# Patient Record
Sex: FEMALE | Race: WHITE | NOT HISPANIC OR LATINO | Employment: FULL TIME | URBAN - METROPOLITAN AREA
[De-identification: names, ages, dates, MRNs, and addresses within clinical notes are randomized per-mention and may not be internally consistent; named-entity substitution may affect disease eponyms.]

---

## 2018-09-08 ENCOUNTER — HOSPITAL ENCOUNTER (EMERGENCY)
Facility: HOSPITAL | Age: 53
Discharge: HOME | End: 2018-09-08
Attending: EMERGENCY MEDICINE
Payer: COMMERCIAL

## 2018-09-08 VITALS
TEMPERATURE: 98 F | WEIGHT: 235 LBS | OXYGEN SATURATION: 98 % | BODY MASS INDEX: 40.12 KG/M2 | HEIGHT: 64 IN | HEART RATE: 84 BPM | SYSTOLIC BLOOD PRESSURE: 137 MMHG | DIASTOLIC BLOOD PRESSURE: 79 MMHG | RESPIRATION RATE: 16 BRPM

## 2018-09-08 DIAGNOSIS — S89.91XA RIGHT KNEE INJURY: Primary | ICD-10-CM

## 2018-09-08 PROCEDURE — 99283 EMERGENCY DEPT VISIT LOW MDM: CPT | Mod: 25

## 2018-09-08 PROCEDURE — 25000003 PHARM REV CODE 250: Performed by: EMERGENCY MEDICINE

## 2018-09-08 PROCEDURE — 99283 EMERGENCY DEPT VISIT LOW MDM: CPT | Mod: ,,, | Performed by: EMERGENCY MEDICINE

## 2018-09-08 RX ORDER — HYDROCODONE BITARTRATE AND ACETAMINOPHEN 5; 325 MG/1; MG/1
1 TABLET ORAL
Status: COMPLETED | OUTPATIENT
Start: 2018-09-08 | End: 2018-09-08

## 2018-09-08 RX ORDER — HYDROCODONE BITARTRATE AND ACETAMINOPHEN 5; 325 MG/1; MG/1
1 TABLET ORAL EVERY 4 HOURS PRN
Qty: 12 TABLET | Refills: 0 | Status: SHIPPED | OUTPATIENT
Start: 2018-09-08 | End: 2022-09-03 | Stop reason: CLARIF

## 2018-09-08 RX ORDER — NAPROXEN 375 MG/1
375 TABLET ORAL 2 TIMES DAILY
COMMUNITY
End: 2022-09-03 | Stop reason: CLARIF

## 2018-09-08 RX ADMIN — HYDROCODONE BITARTRATE AND ACETAMINOPHEN 1 TABLET: 5; 325 TABLET ORAL at 09:09

## 2018-09-08 NOTE — ED PROVIDER NOTES
Encounter Date: 9/8/2018    SCRIBE #1 NOTE: I, Veronica Connor, am scribing for, and in the presence of,  Dr. Lee. I have scribed the entire note.       History     Chief Complaint   Patient presents with    Knee Pain     Chronic knee pain. Last received steroid injection in right knee last month.      Time patient was seen by the provider: 8:41 AM      The patient is a 52 y.o. female with chronic left knee pain who presents to the ED with a complaint of right knee pain. Pt reports increased reliance of right knee since left knee injury with worsening right knee pain for the past 4-5 days.Patient reports her knee locked up as she was walking down stairs at home 1 hour PTA. She could not move and had to scoot down the stairs. She reports she received a steroid injection in her left knee a month ago and had adjusted weight bearing to right knee. She states she thinks she overworked herself over the weekend and the pain is now worse with movement. She has been taking prescribed Naprosyn, with increasing dose yesterday, with no relief. Patient contacted her PCP to schedule an appointment 3 days ago, but has not received a call back. She has an appointment scheduled MRI for left knee. No weakness or paresthesias to RLE.      The history is provided by the patient and medical records.     Review of patient's allergies indicates:  No Known Allergies  History reviewed. No pertinent past medical history.  History reviewed. No pertinent surgical history.  Family History   Problem Relation Age of Onset    Diabetes Mother     Hypertension Mother     Diabetes Father     Hypertension Father      Social History     Tobacco Use    Smoking status: Never Smoker    Smokeless tobacco: Never Used   Substance Use Topics    Alcohol use: No     Frequency: Never    Drug use: No     Review of Systems   Constitutional: Negative for fever.   Respiratory: Negative for shortness of breath.    Cardiovascular: Negative for chest pain.    Gastrointestinal: Negative for abdominal pain.   Musculoskeletal: Positive for arthralgias (right knee).   Neurological: Negative for weakness.       Physical Exam     Initial Vitals [09/08/18 0833]   BP Pulse Resp Temp SpO2   137/79 84 16 98.2 °F (36.8 °C) 98 %      MAP       --         Physical Exam    Nursing note and vitals reviewed.  Constitutional: She appears well-developed and well-nourished. She is not diaphoretic. No distress.   Cardiovascular: Normal rate.   Pulmonary/Chest: No respiratory distress.   Musculoskeletal: She exhibits tenderness.        Right knee: She exhibits swelling. She exhibits no effusion, no deformity, no LCL laxity, normal patellar mobility and no MCL laxity. Tenderness found. No medial joint line, no lateral joint line, no MCL, no LCL and no patellar tendon tenderness noted.   Tenderness on the inferior lateral right knee. No calf tenderness. No appreciable effusion. Active flexion and extension is preserved. Anterior drawer sign negative.         ED Course   Procedures  Labs Reviewed - No data to display       Imaging Results          X-Ray Knee 1 or 2 View Right (Final result)  Result time 09/08/18 09:23:41   Procedure changed from X-Ray Knee 3 View Right     Final result by Chana Boothe MD (09/08/18 09:23:41)                 Impression:      No acute fracture.      Electronically signed by: Chana Boothe MD  Date:    09/08/2018  Time:    09:23             Narrative:    EXAMINATION:  XR KNEE 1 OR 2 VIEW RIGHT    CLINICAL HISTORY:  right knee injury;  Unspecified injury of right lower leg, initial encounter    TECHNIQUE:  AP and lateral views of the right knee were performed.    COMPARISON:  None    FINDINGS:  There is no evidence of acute fracture, dislocation, or bone destruction.  There is mild patellofemoral spurring and medial compartment joint space narrowing consistent with early osteoarthritis.                                 Medical Decision Making:   Initial  Assessment:   52 y.o. female presents with right knee pain and low risk injury. My differential diagnosis includes, but is not limited to: fracture, strain, sprain. Patient with soft compartments and neurovascularly intact, I doubt compartment syndrome. Good ROM without fever or significant effusion to suggest septic joint. Will administer ice, analgesics and obtain xray.    Reassessment  Xray reviewed, negative for acute process. Provided with instructions to RICE, analgesics and follow up with PCP.  Clinical Tests:   Radiological Study: Ordered and Reviewed            Scribe Attestation:   Scribe #1: I performed the above scribed service and the documentation accurately describes the services I performed. I attest to the accuracy of the note.    Attending Attestation:           Physician Attestation for Scribe:      Comments: I, Dr. Du Lee, personally performed the services described in this documentation. All medical record entries made by the scribe were at my direction and in my presence.  I have reviewed the chart and agree that the record reflects my personal performance and is accurate and complete. Du Lee MD.  10:17 AM 09/08/2018                 Clinical Impression:   The encounter diagnosis was Right knee injury.      Disposition:   Disposition: Discharged  Condition: Stable                        Du Lee MD  09/08/18 1017

## 2018-09-08 NOTE — ED TRIAGE NOTES
Presents to ER with complaint of right knee pain for 1 month.  Patient's name and date of birth checked and is correct.  LOC: The patient is awake, alert and aware of environment with an appropriate affect, the patient is oriented x 3 and speaking appropriately.  APPEARANCE: Patient resting comfortably and in no acute distress, patient is clean and well groomed, patient's clothing is properly fastened.  CARDIOVASCULAR:  Heart rate regular and even with no peripheral edema noted.  SKIN: The skin is warm and dry, patient has normal skin turgor and moist mucus membranes, skin intact, no breakdown or brusing noted. MUSKULOSKELETAL: Patient moving all extremities, requires assistance to transfer due to pain in her right knee, no obvious swelling or deformities noted.  RESPIRATORY: Airway is open and patent, respirations are spontaneous, patient has a normal effort and rate.

## 2022-01-02 ENCOUNTER — HOSPITAL ENCOUNTER (EMERGENCY)
Facility: HOSPITAL | Age: 57
Discharge: HOME | End: 2022-01-02
Attending: EMERGENCY MEDICINE
Payer: COMMERCIAL

## 2022-01-02 VITALS
BODY MASS INDEX: 40.34 KG/M2 | HEART RATE: 98 BPM | TEMPERATURE: 99 F | WEIGHT: 235 LBS | RESPIRATION RATE: 18 BRPM | OXYGEN SATURATION: 96 % | DIASTOLIC BLOOD PRESSURE: 64 MMHG | SYSTOLIC BLOOD PRESSURE: 131 MMHG

## 2022-01-02 DIAGNOSIS — U07.1 UPPER RESPIRATORY TRACT INFECTION DUE TO COVID-19 VIRUS: Primary | ICD-10-CM

## 2022-01-02 DIAGNOSIS — U07.1 COVID-19 VIRUS INFECTION: ICD-10-CM

## 2022-01-02 DIAGNOSIS — J06.9 UPPER RESPIRATORY TRACT INFECTION DUE TO COVID-19 VIRUS: Primary | ICD-10-CM

## 2022-01-02 DIAGNOSIS — E86.0 DEHYDRATION: ICD-10-CM

## 2022-01-02 DIAGNOSIS — R11.0 NAUSEA: ICD-10-CM

## 2022-01-02 LAB
BUN SERPL-MCNC: 12 MG/DL (ref 6–30)
CHLORIDE SERPL-SCNC: 104 MMOL/L (ref 95–110)
CREAT SERPL-MCNC: 0.4 MG/DL (ref 0.5–1.4)
CTP QC/QA: YES
GLUCOSE SERPL-MCNC: 124 MG/DL (ref 70–110)
HCT VFR BLD CALC: 38 %PCV (ref 36–54)
POC IONIZED CALCIUM: 1.18 MMOL/L (ref 1.06–1.42)
POC TCO2 (MEASURED): 23 MMOL/L (ref 23–29)
POTASSIUM BLD-SCNC: 3.6 MMOL/L (ref 3.5–5.1)
SAMPLE: ABNORMAL
SARS-COV-2 RDRP RESP QL NAA+PROBE: POSITIVE
SODIUM BLD-SCNC: 139 MMOL/L (ref 136–145)

## 2022-01-02 PROCEDURE — 99284 EMERGENCY DEPT VISIT MOD MDM: CPT | Mod: CS,,, | Performed by: EMERGENCY MEDICINE

## 2022-01-02 PROCEDURE — 87389 HIV-1 AG W/HIV-1&-2 AB AG IA: CPT | Performed by: EMERGENCY MEDICINE

## 2022-01-02 PROCEDURE — 80047 BASIC METABLC PNL IONIZED CA: CPT

## 2022-01-02 PROCEDURE — 86803 HEPATITIS C AB TEST: CPT | Performed by: EMERGENCY MEDICINE

## 2022-01-02 PROCEDURE — 99284 PR EMERGENCY DEPT VISIT,LEVEL IV: ICD-10-PCS | Mod: CS,,, | Performed by: EMERGENCY MEDICINE

## 2022-01-02 PROCEDURE — 63600175 PHARM REV CODE 636 W HCPCS: Performed by: EMERGENCY MEDICINE

## 2022-01-02 PROCEDURE — U0002 COVID-19 LAB TEST NON-CDC: HCPCS | Performed by: EMERGENCY MEDICINE

## 2022-01-02 PROCEDURE — 25000003 PHARM REV CODE 250: Performed by: EMERGENCY MEDICINE

## 2022-01-02 PROCEDURE — 99284 EMERGENCY DEPT VISIT MOD MDM: CPT | Mod: 25

## 2022-01-02 RX ORDER — ONDANSETRON 4 MG/1
4 TABLET, ORALLY DISINTEGRATING ORAL
Status: COMPLETED | OUTPATIENT
Start: 2022-01-02 | End: 2022-01-02

## 2022-01-02 RX ORDER — BUTALBITAL, ACETAMINOPHEN AND CAFFEINE 50; 325; 40 MG/1; MG/1; MG/1
1 TABLET ORAL EVERY 6 HOURS PRN
Qty: 20 TABLET | Refills: 0 | Status: SHIPPED | OUTPATIENT
Start: 2022-01-02 | End: 2022-01-07

## 2022-01-02 RX ORDER — ONDANSETRON 4 MG/1
4 TABLET, FILM COATED ORAL EVERY 6 HOURS
Qty: 12 TABLET | Refills: 0 | Status: SHIPPED | OUTPATIENT
Start: 2022-01-02 | End: 2022-09-03 | Stop reason: CLARIF

## 2022-01-02 RX ORDER — GUAIFENESIN/DEXTROMETHORPHAN 100-10MG/5
10 SYRUP ORAL
Status: COMPLETED | OUTPATIENT
Start: 2022-01-02 | End: 2022-01-02

## 2022-01-02 RX ORDER — ACETAMINOPHEN 500 MG
1000 TABLET ORAL
Status: COMPLETED | OUTPATIENT
Start: 2022-01-02 | End: 2022-01-02

## 2022-01-02 RX ADMIN — SODIUM CHLORIDE 500 ML: 0.9 INJECTION, SOLUTION INTRAVENOUS at 10:01

## 2022-01-02 RX ADMIN — ONDANSETRON 4 MG: 4 TABLET, ORALLY DISINTEGRATING ORAL at 10:01

## 2022-01-02 RX ADMIN — DEXAMETHASONE 6 MG: 4 TABLET ORAL at 11:01

## 2022-01-02 RX ADMIN — ACETAMINOPHEN 1000 MG: 500 TABLET ORAL at 10:01

## 2022-01-02 RX ADMIN — GUAIFENESIN AND DEXTROMETHORPHAN 10 ML: 100; 10 SYRUP ORAL at 10:01

## 2022-01-02 NOTE — Clinical Note
"Yenifer"India Chatterjee was seen and treated in our emergency department on 1/2/2022.     COVID-19 is present in our communities across the state. There is limited testing for COVID at this time, so not all patients can be tested. In this situation, your employee meets the following criteria:    Yenifer Chatterjee has met the criteria for COVID-19 testing and has a POSITIVE result. She can return to work once they are asymptomatic for 72 hours without the use of fever reducing medications AND at least ten days from the first positive result.     If you have any questions or concerns, or if I can be of further assistance, please do not hesitate to contact me.    Sincerely,             Salomon Goodwin, DO"

## 2022-01-03 LAB
HCV AB SERPL QL IA: NEGATIVE
HIV 1+2 AB+HIV1 P24 AG SERPL QL IA: NEGATIVE

## 2022-01-03 NOTE — ED NOTES
"Yenifer Chatterjee, a 56 y.o. female presents to the ED w/ complaint of covid concerns. Pt states she tested positive yesterday and has experienced "nonstop coughing" since then. Pt reports HA, SOB, sore throat, and fever.    Triage note:  Chief Complaint   Patient presents with    COVID-19 Concerns     Had +home covid test. Pt c/o headache, sore throat, n/v, and fever      Review of patient's allergies indicates:  No Known Allergies  No past medical history on file.    Patient identifiers verified and correct for Yenifer Chatterjee    LOC: The patient is awake, alert, and aware of environment. The patient is AOX4 and speaking appropriately.   APPEARANCE: No acute distress noted.   HEENT: HA, sore throat, cough, PERRLA  PSYCHOSOCIAL: Patient is calm and cooperative. Denies SI/HI.  SKIN: The skin is warm, dry, color consistent with ethnicity. No breakdown or brusing visible.  RESPIRATORY: Airway is open and patent. Bilateral chest rise and fall. Respiratory rate even and unlabored.  No accessory muscle use noted.  CARDIAC: Patient has a normal rate and rhythm. No complaints of chest pain.  ABDOMEN/GI: Soft, non tender. No distention noted. Denies n/v/d.   URINARY:  Voids independently without difficulty. No complaints of frequency, urgency, burning, or blood in urine.   NEUROLOGIC: Eyes open spontaneously. Speech clear.  Able to follow commands, demonstrating ability to actively and appropriately communicate within context of current conversation. Symmetrical facial muscles. Movement is purposeful. Denies dizziness/lightheadedness.  MUSCULOSKELETAL: No obvious deformities noted. Full ROM in all extremities.  PERIPHERAL VASCULAR: Cap refill <3 secs bilaterally. No peripheral edema noted. Denies numbness and tingling in extremities.      "

## 2022-01-03 NOTE — ED NOTES
I-STAT Chem-8+ Results:   Value Reference Range   Sodium 139 136-145 mmol/L   Potassium  3.6 3.5-5.1 mmol/L   Chloride 104  mmol/L   Ionized Calcium 1.18 1.06-1.42 mmol/L   CO2 (measured) 23 23-29 mmol/L   Glucose 124  mg/dL   BUN 12 6-30 mg/dL   Creatinine 0.4 0.5-1.4 mg/dL   Hematocrit 38 36-54%

## 2022-01-03 NOTE — ED PROVIDER NOTES
Encounter Date: 1/2/2022       History     Chief Complaint   Patient presents with    COVID-19 Concerns     Had +home covid test. Pt c/o headache, sore throat, n/v, and fever      HPI   Yenifer Chatterjee is a 56-year-old female with no significant medical history presenting with headache, sore throat, nausea, myalgias and fever.  She reports taking Advil 3 times a day, and is currently afebrile but complains of a headache, sore throat and body aches.  She had a home test that was positive for COVID yesterday and continue having symptoms.  She denies any active chest pain, lightheadedness, dizziness, visual changes, abdominal pain, chest pain, leg pain, arm pain or jaw pain.  No other aggravating or alleviating factors.  Onset has been gradual.  No known sick contacts, no history of diabetes, hypertension or cardiac disease.    Review of patient's allergies indicates:  No Known Allergies  No past medical history on file.  No past surgical history on file.  Family History   Problem Relation Age of Onset    Diabetes Mother     Hypertension Mother     Diabetes Father     Hypertension Father      Social History     Tobacco Use    Smoking status: Never Smoker    Smokeless tobacco: Never Used   Substance Use Topics    Alcohol use: No    Drug use: No     Review of Systems   Constitutional: Positive for fatigue and fever. Negative for chills.   HENT: Positive for rhinorrhea and sore throat. Negative for congestion, ear pain and trouble swallowing.    Eyes: Negative for photophobia, redness and visual disturbance.   Respiratory: Positive for cough. Negative for chest tightness and shortness of breath.    Gastrointestinal: Positive for nausea (Post-tussive emesis) and vomiting. Negative for abdominal pain.   Genitourinary: Negative for dysuria and flank pain.   Musculoskeletal: Positive for myalgias. Negative for back pain and neck pain.   Skin: Negative for pallor and wound.   Neurological: Positive for headaches.  Negative for tremors, weakness and light-headedness.   Psychiatric/Behavioral: Negative for confusion. The patient is not nervous/anxious.        Physical Exam     Initial Vitals [01/02/22 2149]   BP Pulse Resp Temp SpO2   (!) 116/58 108 20 98.8 °F (37.1 °C) 95 %      MAP       --         Physical Exam    Nursing note and vitals reviewed.      Constitutional: Well-developed. Well-nourished. Moderate emotional distress.  HENT: NCAT. OP moist. Uvula midline. No OP masses. Posterior pharynx with no erythema. No tonsillar edema. No exudate. Floor of the mouth is soft, tongue is not elevated. No rhinorrhea. TMs clear bilaterally. Mastoid process nontender bilaterally.  EYES: No conjunctival injection or discharge.  NECK: Full ROM. Supple. No rigidity. No cervical LAD. No stridor. Brudzinskis test negative.  CARDIAC: RRR. No murmurs or rubs. Strong distal pulses.  PULM: Normal effort. Breath sounds clear bilaterally. No wheezes. No crackles.  ABD: Soft, non-tender, non-distended, normal BS. No guarding. No rebound.  : No CVA tenderness.  MS: Warm and well perfused. No edema..  NEURO: Alert and oriented x3.  No sensory or motor deficits.  Negative Romberg.  Normal gate.  SKIN: Dry. No rashes.  No cyanosis or jaundice.  PSYCH: Normal judgment. Normal affect.        ED Course   Procedures  Labs Reviewed   SARS-COV-2 RDRP GENE - Abnormal; Notable for the following components:       Result Value    POC Rapid COVID Positive (*)     All other components within normal limits    Narrative:     This test utilizes isothermal nucleic acid amplification   technology to detect the SARS-CoV-2 RdRp nucleic acid segment.   The analytical sensitivity (limit of detection) is 125 genome   equivalents/mL.   A POSITIVE result implies infection with the SARS-CoV-2 virus;   the patient is presumed to be contagious.     A NEGATIVE result means that SARS-CoV-2 nucleic acids are not   present above the limit of detection. A NEGATIVE result  should be   treated as presumptive. It does not rule out the possibility of   COVID-19 and should not be the sole basis for treatment decisions.   If COVID-19 is strongly suspected based on clinical and exposure   history, re-testing using an alternate molecular assay should be   considered.   This test is only for use under the Food and Drug   Administration s Emergency Use Authorization (EUA).   Commercial kits are provided by [employer].   Performance characteristics of the EUA have been independently   verified by [facility] Department of   Pathology and Laboratory Medicine.   _________________________________________________________________   The authorized Fact Sheet for Healthcare Providers and the authorized Fact   Sheet for Patients of the ID NOW COVID-19 are available on the FDA   website:     https://www.fda.gov/media/129175/download  https://www.fda.gov/media/775688/download       ISTAT PROCEDURE - Abnormal; Notable for the following components:    POC Glucose 124 (*)     POC Creatinine 0.4 (*)     All other components within normal limits   HIV 1 / 2 ANTIBODY    Narrative:     Release to patient->Immediate   HEPATITIS C ANTIBODY    Narrative:     Release to patient->Immediate          Imaging Results          X-Ray Chest AP Portable (Final result)  Result time 01/02/22 23:03:46    Final result by Ben Hamilton MD (01/02/22 23:03:46)                 Impression:      No acute findings in the chest noting limited assessment of the lower lateral left hemithorax due to overlying artifact.  If clinically indicated, repeat chest x-ray can be ordered with artifact removed.      Electronically signed by: Ben Hamilton MD  Date:    01/02/2022  Time:    23:03             Narrative:    EXAMINATION:  XR CHEST AP PORTABLE    CLINICAL HISTORY:  cough;    TECHNIQUE:  Single frontal view of the chest was performed.    COMPARISON:  None    FINDINGS:  Lower lateral left hemithorax partially  obscured by overlying external artifact.    No consolidation, pleural effusion or pneumothorax identified.    Cardiomediastinal silhouette is unremarkable.                              X-Rays:   Independently Interpreted Readings:   Chest X-Ray: Normal heart size.  No infiltrates.  No acute abnormalities. No pneumothorax or free air     Medications   ondansetron disintegrating tablet 4 mg (4 mg Oral Given 1/2/22 2225)   acetaminophen tablet 1,000 mg (1,000 mg Oral Given 1/2/22 2225)   dextromethorphan-guaiFENesin  mg/5 ml liquid 10 mL (10 mLs Oral Given 1/2/22 2225)   sodium chloride 0.9% bolus 500 mL (0 mLs Intravenous Stopped 1/2/22 2310)   dexAMETHasone tablet 6 mg (6 mg Oral Given 1/2/22 2322)     Medical Decision Making:   History:   Old Medical Records: I decided to obtain old medical records.  Initial Assessment:   Yenifer Chatterjee is a 56-year-old female with no significant medical history presenting with headache, sore throat, nausea, myalgias and fever.  Differential Diagnosis:   Upper respiratory infection secondary COVID-19, COVID-19 infection, pneumonia, URI, viral syndrome  Independently Interpreted Test(s):   I have ordered and independently interpreted X-rays - see prior notes.  Clinical Tests:   Lab Tests: Ordered and Reviewed  Radiological Study: Ordered and Reviewed    Urgent evaluation patient presenting with nausea and concern for dehydration secondary to self tested COVID-19.  She is currently afebrile vital signs are stable.  Physical exam findings remarkable for dry skin, dry mucous membranes, lung sounds clear, cardiac exam unremarkable.  She continues to have cough with post-tussive emesis and nausea.  Given worsening cough in the setting of COVID-19, chest x-ray obtained which shows no evidence of pneumonia, pneumothorax or free air on my read.  Patient treated with antiemetic in the ED, and she was able to tolerate p.o. during ED evaluation.  Skin, exam consistent with likely  dehydration from poor p.o. intake.  Patient tolerating p.o., discharged home with Zofran and close outpatient follow-up as needed.  Oxygen saturation greater than 96% on room air with ambulation.  Patient given strict ED precautions and return instructions and follow-up plan in place.  Do not suspect pneumonia, serious bacterial infection, or hypoxemic respiratory failure. Patient agreeable to discharge plan. Strict ED precautions and return instructions discussed at length and patient verbalized understanding. All questions were answered and ample time was given for questions.      Complexity:  Moderate high risk                    Clinical Impression:   Final diagnoses:  [U07.1, J06.9] Upper respiratory tract infection due to COVID-19 virus (Primary)  [U07.1] COVID-19 virus infection  [E86.0] Dehydration  [R11.0] Nausea          ED Disposition Condition    Discharge Stable        ED Prescriptions     Medication Sig Dispense Start Date End Date Auth. Provider    ondansetron (ZOFRAN) 4 MG tablet Take 1 tablet (4 mg total) by mouth every 6 (six) hours. 12 tablet 1/2/2022  Salomon Goodwin DO    butalbital-acetaminophen-caffeine -40 mg (FIORICET, ESGIC) -40 mg per tablet Take 1 tablet by mouth every 6 (six) hours as needed for Pain. 20 tablet 1/2/2022 1/7/2022 Salomon Goodwin DO        Follow-up Information     Follow up With Specialties Details Why Contact Info Additional Information    [facility] Internal Medicine Schedule an appointment as soon as possible for a visit in 1 week As needed, If symptoms worsen [address]  [facility] [id number] [phone] [facility] Please park in surface lot and check in at central registration desk         Salomon Goodwin DO, FAAEM  Emergency Staff Physician   Dept of Emergency Medicine   [facility]  Spectralink: [phone]        Disclaimer: This note has been generated using  voice-recognition software. There may be typographical errors that have been missed during proof-reading.       Salomon Goodwin,   01/04/22 1503

## 2022-01-03 NOTE — DISCHARGE INSTRUCTIONS
Today, your evaluation for your symptoms are likely due to COVID-19 upper respiratory infection.  You have been given medication for nausea, medication for headache, and will require follow-up with your primary care in 1 week.  Here given fluids for dehydration.  Your labs are normal and your chest x-ray did not show any acute pneumonia.    Our goal in the emergency department is to always give you outstanding care and exceptional service. You may receive a survey by mail or e-mail in the next week regarding your experience in our ED. We would greatly appreciate your completing and returning the survey. Your feedback provides us with a way to recognize our staff who give very good care and it helps us learn how to improve when your experience was below our aspiration of excellence.

## 2022-09-03 ENCOUNTER — HOSPITAL ENCOUNTER (EMERGENCY)
Facility: HOSPITAL | Age: 57
Discharge: HOME | End: 2022-09-03
Attending: EMERGENCY MEDICINE
Payer: COMMERCIAL

## 2022-09-03 VITALS
RESPIRATION RATE: 16 BRPM | BODY MASS INDEX: 39.41 KG/M2 | DIASTOLIC BLOOD PRESSURE: 58 MMHG | OXYGEN SATURATION: 95 % | HEART RATE: 94 BPM | TEMPERATURE: 100 F | SYSTOLIC BLOOD PRESSURE: 115 MMHG | WEIGHT: 229.63 LBS

## 2022-09-03 DIAGNOSIS — R11.2 NAUSEA AND VOMITING, INTRACTABILITY OF VOMITING NOT SPECIFIED, UNSPECIFIED VOMITING TYPE: Primary | ICD-10-CM

## 2022-09-03 LAB
INFLUENZA A, MOLECULAR: NEGATIVE
INFLUENZA B, MOLECULAR: NEGATIVE
SARS-COV-2 RDRP RESP QL NAA+PROBE: NEGATIVE
SPECIMEN SOURCE: NORMAL

## 2022-09-03 PROCEDURE — 96374 THER/PROPH/DIAG INJ IV PUSH: CPT

## 2022-09-03 PROCEDURE — 96361 HYDRATE IV INFUSION ADD-ON: CPT

## 2022-09-03 PROCEDURE — 87502 INFLUENZA DNA AMP PROBE: CPT | Performed by: EMERGENCY MEDICINE

## 2022-09-03 PROCEDURE — 63600175 PHARM REV CODE 636 W HCPCS: Performed by: EMERGENCY MEDICINE

## 2022-09-03 PROCEDURE — 96375 TX/PRO/DX INJ NEW DRUG ADDON: CPT

## 2022-09-03 PROCEDURE — 99284 EMERGENCY DEPT VISIT MOD MDM: CPT | Mod: 25

## 2022-09-03 PROCEDURE — 25000003 PHARM REV CODE 250: Performed by: EMERGENCY MEDICINE

## 2022-09-03 PROCEDURE — U0002 COVID-19 LAB TEST NON-CDC: HCPCS | Performed by: EMERGENCY MEDICINE

## 2022-09-03 RX ORDER — ONDANSETRON 4 MG/1
4 TABLET, ORALLY DISINTEGRATING ORAL EVERY 6 HOURS PRN
Qty: 30 TABLET | Refills: 0 | Status: ON HOLD | OUTPATIENT
Start: 2022-09-03 | End: 2024-03-19 | Stop reason: HOSPADM

## 2022-09-03 RX ORDER — KETOROLAC TROMETHAMINE 30 MG/ML
15 INJECTION, SOLUTION INTRAMUSCULAR; INTRAVENOUS
Status: COMPLETED | OUTPATIENT
Start: 2022-09-03 | End: 2022-09-03

## 2022-09-03 RX ORDER — ONDANSETRON 2 MG/ML
8 INJECTION INTRAMUSCULAR; INTRAVENOUS
Status: COMPLETED | OUTPATIENT
Start: 2022-09-03 | End: 2022-09-03

## 2022-09-03 RX ADMIN — KETOROLAC TROMETHAMINE 15 MG: 30 INJECTION, SOLUTION INTRAMUSCULAR; INTRAVENOUS at 05:09

## 2022-09-03 RX ADMIN — ONDANSETRON 8 MG: 2 INJECTION INTRAMUSCULAR; INTRAVENOUS at 05:09

## 2022-09-03 RX ADMIN — SODIUM CHLORIDE 1000 ML: 0.9 INJECTION, SOLUTION INTRAVENOUS at 05:09

## 2022-09-03 NOTE — ED PROVIDER NOTES
Encounter Date: 9/3/2022       History     Chief Complaint   Patient presents with    General Illness     Vomiting times 1, chills, headaches, stomach hurting. All since around 5 yesterday afternoon. Feels dehydrated.      Healthy 56-year-old female presents to the ER with 12 hours of nausea, headache, chills and 1 episode of vomiting.  Patient reports her mouth is dry and she is thirsty.  She reports she has no saliva in her mouth.  No urinary symptoms.  No cough no sore throat no runny nose.  No diarrhea.  Patient took Tylenol last night. Stomach cramps, no flank pain no hematuria.    The history is provided by the patient and the spouse.   Review of patient's allergies indicates:  No Known Allergies  History reviewed. No pertinent past medical history.  History reviewed. No pertinent surgical history.  Family History   Problem Relation Age of Onset    Diabetes Mother     Hypertension Mother     Diabetes Father     Hypertension Father      Social History     Tobacco Use    Smoking status: Never    Smokeless tobacco: Never   Substance Use Topics    Alcohol use: No    Drug use: No     Review of Systems   Constitutional:  Positive for activity change, appetite change, chills and fatigue.   HENT: Negative.     Respiratory:  Negative for cough.    Cardiovascular:  Negative for chest pain.   Gastrointestinal:  Positive for abdominal pain (cramps), nausea and vomiting.   Genitourinary: Negative.  Negative for flank pain and hematuria.   Musculoskeletal:  Positive for myalgias.   Skin:  Negative for rash.   Neurological:  Positive for headaches.     Physical Exam     Initial Vitals [09/03/22 0429]   BP Pulse Resp Temp SpO2   122/81 (!) 113 16 99.7 °F (37.6 °C) 96 %      MAP       --         Physical Exam    Nursing note and vitals reviewed.  Constitutional: She appears well-developed and well-nourished. She is not diaphoretic. No distress.   Appears uncomfortable   HENT:   Head: Normocephalic and atraumatic.   Eyes: EOM  are normal.   Neck: Neck supple.   Normal range of motion.  Cardiovascular:  Normal rate, regular rhythm and normal heart sounds.     Exam reveals no gallop and no friction rub.       No murmur heard.  Pulmonary/Chest: Breath sounds normal. No respiratory distress. She has no wheezes. She has no rhonchi. She has no rales.   Abdominal: Abdomen is soft. She exhibits no distension. There is no abdominal tenderness. There is no rebound and no guarding.   Musculoskeletal:         General: Normal range of motion.      Cervical back: Normal range of motion and neck supple.     Neurological: She is alert and oriented to person, place, and time.   Skin: Skin is warm and dry.   Psychiatric: She has a normal mood and affect. Her behavior is normal. Judgment and thought content normal.       ED Course   Procedures  Labs Reviewed   INFLUENZA A & B BY MOLECULAR   SARS-COV-2 RNA AMPLIFICATION, QUAL          Imaging Results    None          Medications   sodium chloride 0.9% bolus 1,000 mL (0 mLs Intravenous Stopped 9/3/22 0603)   ondansetron injection 8 mg (8 mg Intravenous Given 9/3/22 0503)   ketorolac injection 15 mg (15 mg Intravenous Given 9/3/22 0502)     Medical Decision Making:   Clinical Tests:   Lab Tests: Ordered and Reviewed           ED Course as of 09/04/22 2054   Sat Sep 03, 2022   0512 SARS-CoV-2 RNA, Amplification, Qual: Negative [EF]   0512 Influenza A, Molecular: Negative [EF]   0512 Influenza B, Molecular: Negative [EF]   0617 56-year-old female presents the ER for headache, chills, nausea and 1 episode of vomiting for last 12 hours.  Patient reports she feels thirsty and dehydrated.  All symptoms are resolved in the ER with Toradol Zofran and IV fluids.  Symptoms are likely viral in nature.  She has no signs of meningitis.  Abdominal exam is benign.  She has no dysuria or flank pain to suggest a  cause of her symptoms. No reason for further labs or imaging, DC from ER symptom free. [EF]      ED Course User  Index  [EF] Jevon Miguel MD             Clinical Impression:   Final diagnoses:  [R11.2] Nausea and vomiting, intractability of vomiting not specified, unspecified vomiting type (Primary)      ED Disposition Condition    Discharge Stable          ED Prescriptions       Medication Sig Dispense Start Date End Date Auth. Provider    ondansetron (ZOFRAN-ODT) 4 MG TbDL Take 1 tablet (4 mg total) by mouth every 6 (six) hours as needed (nausea vomiting). 30 tablet 9/3/2022 -- Jevon Miguel MD          Follow-up Information       Follow up With Specialties Details Why Contact Info    [facility] Emergency Dept Emergency Medicine  As needed, If symptoms worsen [address] [place] [id number] [phone]             Jevon Miguel MD  09/04/22 2055

## 2022-09-04 ENCOUNTER — ANESTHESIA (OUTPATIENT)
Dept: SURGERY | Facility: HOSPITAL | Age: 57
DRG: 854 | End: 2022-09-04
Payer: COMMERCIAL

## 2022-09-04 ENCOUNTER — ANESTHESIA EVENT (OUTPATIENT)
Dept: SURGERY | Facility: HOSPITAL | Age: 57
DRG: 854 | End: 2022-09-04
Payer: COMMERCIAL

## 2022-09-04 ENCOUNTER — HOSPITAL ENCOUNTER (INPATIENT)
Facility: HOSPITAL | Age: 57
LOS: 3 days | Discharge: HOME | DRG: 854 | End: 2022-09-07
Attending: EMERGENCY MEDICINE | Admitting: UROLOGY
Payer: COMMERCIAL

## 2022-09-04 DIAGNOSIS — R06.02 SOB (SHORTNESS OF BREATH): ICD-10-CM

## 2022-09-04 DIAGNOSIS — N20.1 RIGHT URETERAL STONE: Primary | ICD-10-CM

## 2022-09-04 DIAGNOSIS — N20.1 URETERAL STONE: ICD-10-CM

## 2022-09-04 LAB
ALBUMIN SERPL BCP-MCNC: 2.8 G/DL (ref 3.5–5.2)
ALLENS TEST: ABNORMAL
ALLENS TEST: NORMAL
ALP SERPL-CCNC: 79 U/L (ref 55–135)
ALT SERPL W/O P-5'-P-CCNC: 30 U/L (ref 10–44)
ANION GAP SERPL CALC-SCNC: 7 MMOL/L (ref 8–16)
AST SERPL-CCNC: 32 U/L (ref 10–40)
BACTERIA #/AREA URNS AUTO: ABNORMAL /HPF
BASOPHILS # BLD AUTO: 0.03 K/UL (ref 0–0.2)
BASOPHILS NFR BLD: 0.2 % (ref 0–1.9)
BILIRUB SERPL-MCNC: 1.6 MG/DL (ref 0.1–1)
BILIRUB UR QL STRIP: NEGATIVE
BNP SERPL-MCNC: 78 PG/ML (ref 0–99)
BUN SERPL-MCNC: 18 MG/DL (ref 6–20)
CALCIUM SERPL-MCNC: 8.7 MG/DL (ref 8.7–10.5)
CHLORIDE SERPL-SCNC: 107 MMOL/L (ref 95–110)
CLARITY UR REFRACT.AUTO: ABNORMAL
CO2 SERPL-SCNC: 18 MMOL/L (ref 23–29)
COLOR UR AUTO: YELLOW
CREAT SERPL-MCNC: 1.4 MG/DL (ref 0.5–1.4)
D DIMER PPP IA.FEU-MCNC: 9.86 MG/L FEU
DELSYS: ABNORMAL
DELSYS: NORMAL
DIFFERENTIAL METHOD: ABNORMAL
EOSINOPHIL # BLD AUTO: 0 K/UL (ref 0–0.5)
EOSINOPHIL NFR BLD: 0 % (ref 0–8)
ERYTHROCYTE [DISTWIDTH] IN BLOOD BY AUTOMATED COUNT: 13.1 % (ref 11.5–14.5)
EST. GFR  (NO RACE VARIABLE): 43.6 ML/MIN/1.73 M^2
GLUCOSE SERPL-MCNC: 148 MG/DL (ref 70–110)
GLUCOSE UR QL STRIP: NEGATIVE
HCO3 UR-SCNC: 24.3 MMOL/L (ref 24–28)
HCT VFR BLD AUTO: 35.5 % (ref 37–48.5)
HCV AB SERPL QL IA: NORMAL
HGB BLD-MCNC: 12.1 G/DL (ref 12–16)
HGB UR QL STRIP: ABNORMAL
HIV 1+2 AB+HIV1 P24 AG SERPL QL IA: NORMAL
HYALINE CASTS UR QL AUTO: 0 /LPF
IMM GRANULOCYTES # BLD AUTO: 0.11 K/UL (ref 0–0.04)
IMM GRANULOCYTES NFR BLD AUTO: 0.8 % (ref 0–0.5)
KETONES UR QL STRIP: ABNORMAL
LACTATE SERPL-SCNC: 3.8 MMOL/L (ref 0.5–2.2)
LDH SERPL L TO P-CCNC: 2.17 MMOL/L (ref 0.5–2.2)
LEUKOCYTE ESTERASE UR QL STRIP: ABNORMAL
LIPASE SERPL-CCNC: 5 U/L (ref 4–60)
LYMPHOCYTES # BLD AUTO: 0.5 K/UL (ref 1–4.8)
LYMPHOCYTES NFR BLD: 3.1 % (ref 18–48)
MCH RBC QN AUTO: 31.5 PG (ref 27–31)
MCHC RBC AUTO-ENTMCNC: 34.1 G/DL (ref 32–36)
MCV RBC AUTO: 92 FL (ref 82–98)
MICROSCOPIC COMMENT: ABNORMAL
MODE: ABNORMAL
MODE: NORMAL
MONOCYTES # BLD AUTO: 1 K/UL (ref 0.3–1)
MONOCYTES NFR BLD: 6.8 % (ref 4–15)
NEUTROPHILS # BLD AUTO: 13.1 K/UL (ref 1.8–7.7)
NEUTROPHILS NFR BLD: 89.1 % (ref 38–73)
NITRITE UR QL STRIP: NEGATIVE
NRBC BLD-RTO: 0 /100 WBC
PCO2 BLDA: 36.9 MMHG (ref 35–45)
PH SMN: 7.43 [PH] (ref 7.35–7.45)
PH UR STRIP: 6 [PH] (ref 5–8)
PLATELET # BLD AUTO: 118 K/UL (ref 150–450)
PMV BLD AUTO: 10.5 FL (ref 9.2–12.9)
PO2 BLDA: 15 MMHG (ref 40–60)
POC BE: 0 MMOL/L
POC SATURATED O2: 20 % (ref 95–100)
POC TCO2: 25 MMOL/L (ref 24–29)
POCT GLUCOSE: 154 MG/DL (ref 70–110)
POTASSIUM SERPL-SCNC: 3.1 MMOL/L (ref 3.5–5.1)
PROT SERPL-MCNC: 6.3 G/DL (ref 6–8.4)
PROT UR QL STRIP: ABNORMAL
RBC # BLD AUTO: 3.84 M/UL (ref 4–5.4)
RBC #/AREA URNS AUTO: 24 /HPF (ref 0–4)
SAMPLE: ABNORMAL
SAMPLE: NORMAL
SITE: ABNORMAL
SITE: NORMAL
SODIUM SERPL-SCNC: 132 MMOL/L (ref 136–145)
SP GR UR STRIP: >1.03 (ref 1–1.03)
SQUAMOUS #/AREA URNS AUTO: 2 /HPF
TROPONIN I SERPL DL<=0.01 NG/ML-MCNC: <0.006 NG/ML (ref 0–0.03)
URN SPEC COLLECT METH UR: ABNORMAL
WBC # BLD AUTO: 14.65 K/UL (ref 3.9–12.7)
WBC #/AREA URNS AUTO: >100 /HPF (ref 0–5)

## 2022-09-04 PROCEDURE — 99285 PR EMERGENCY DEPT VISIT,LEVEL V: ICD-10-PCS | Mod: ,,, | Performed by: EMERGENCY MEDICINE

## 2022-09-04 PROCEDURE — 63600175 PHARM REV CODE 636 W HCPCS: Performed by: NURSE ANESTHETIST, CERTIFIED REGISTERED

## 2022-09-04 PROCEDURE — 25500020 PHARM REV CODE 255: Performed by: UROLOGY

## 2022-09-04 PROCEDURE — 52332 CYSTOSCOPY AND TREATMENT: CPT | Mod: RT,,, | Performed by: UROLOGY

## 2022-09-04 PROCEDURE — 84484 ASSAY OF TROPONIN QUANT: CPT

## 2022-09-04 PROCEDURE — 96375 TX/PRO/DX INJ NEW DRUG ADDON: CPT

## 2022-09-04 PROCEDURE — C2617 STENT, NON-COR, TEM W/O DEL: HCPCS | Performed by: UROLOGY

## 2022-09-04 PROCEDURE — 71000033 HC RECOVERY, INTIAL HOUR: Performed by: UROLOGY

## 2022-09-04 PROCEDURE — 83690 ASSAY OF LIPASE: CPT

## 2022-09-04 PROCEDURE — 85025 COMPLETE CBC W/AUTO DIFF WBC: CPT

## 2022-09-04 PROCEDURE — 93010 EKG 12-LEAD: ICD-10-PCS | Mod: ,,, | Performed by: INTERNAL MEDICINE

## 2022-09-04 PROCEDURE — 96365 THER/PROPH/DIAG IV INF INIT: CPT

## 2022-09-04 PROCEDURE — 11000001 HC ACUTE MED/SURG PRIVATE ROOM

## 2022-09-04 PROCEDURE — 63600175 PHARM REV CODE 636 W HCPCS

## 2022-09-04 PROCEDURE — 96361 HYDRATE IV INFUSION ADD-ON: CPT

## 2022-09-04 PROCEDURE — 25000003 PHARM REV CODE 250

## 2022-09-04 PROCEDURE — 63600175 PHARM REV CODE 636 W HCPCS: Performed by: STUDENT IN AN ORGANIZED HEALTH CARE EDUCATION/TRAINING PROGRAM

## 2022-09-04 PROCEDURE — 99285 EMERGENCY DEPT VISIT HI MDM: CPT | Mod: 25

## 2022-09-04 PROCEDURE — 80053 COMPREHEN METABOLIC PANEL: CPT

## 2022-09-04 PROCEDURE — 37000008 HC ANESTHESIA 1ST 15 MINUTES: Performed by: UROLOGY

## 2022-09-04 PROCEDURE — 25000003 PHARM REV CODE 250: Performed by: NURSE ANESTHETIST, CERTIFIED REGISTERED

## 2022-09-04 PROCEDURE — 87040 BLOOD CULTURE FOR BACTERIA: CPT

## 2022-09-04 PROCEDURE — 25000003 PHARM REV CODE 250: Performed by: ANESTHESIOLOGY

## 2022-09-04 PROCEDURE — 36000707: Performed by: UROLOGY

## 2022-09-04 PROCEDURE — D9220A PRA ANESTHESIA: Mod: CRNA,,, | Performed by: NURSE ANESTHETIST, CERTIFIED REGISTERED

## 2022-09-04 PROCEDURE — 99900035 HC TECH TIME PER 15 MIN (STAT)

## 2022-09-04 PROCEDURE — 71000016 HC POSTOP RECOV ADDL HR: Performed by: UROLOGY

## 2022-09-04 PROCEDURE — 52332 PR CYSTOSCOPY,INSERT URETERAL STENT: ICD-10-PCS | Mod: RT,,, | Performed by: UROLOGY

## 2022-09-04 PROCEDURE — D9220A PRA ANESTHESIA: Mod: ANES,,, | Performed by: ANESTHESIOLOGY

## 2022-09-04 PROCEDURE — 82803 BLOOD GASES ANY COMBINATION: CPT

## 2022-09-04 PROCEDURE — 87389 HIV-1 AG W/HIV-1&-2 AB AG IA: CPT | Performed by: PHYSICIAN ASSISTANT

## 2022-09-04 PROCEDURE — 87186 SC STD MICRODIL/AGAR DIL: CPT

## 2022-09-04 PROCEDURE — 74420 PR  X-RAY RETROGRADE PYELOGRAM: ICD-10-PCS | Mod: 26,,, | Performed by: UROLOGY

## 2022-09-04 PROCEDURE — 99222 1ST HOSP IP/OBS MODERATE 55: CPT | Mod: 25,,, | Performed by: UROLOGY

## 2022-09-04 PROCEDURE — 81001 URINALYSIS AUTO W/SCOPE: CPT

## 2022-09-04 PROCEDURE — 99222 PR INITIAL HOSPITAL CARE,LEVL II: ICD-10-PCS | Mod: 25,,, | Performed by: UROLOGY

## 2022-09-04 PROCEDURE — 87077 CULTURE AEROBIC IDENTIFY: CPT | Mod: 59

## 2022-09-04 PROCEDURE — 25500020 PHARM REV CODE 255: Performed by: EMERGENCY MEDICINE

## 2022-09-04 PROCEDURE — 87086 URINE CULTURE/COLONY COUNT: CPT

## 2022-09-04 PROCEDURE — 83605 ASSAY OF LACTIC ACID: CPT

## 2022-09-04 PROCEDURE — 37000009 HC ANESTHESIA EA ADD 15 MINS: Performed by: UROLOGY

## 2022-09-04 PROCEDURE — 99285 EMERGENCY DEPT VISIT HI MDM: CPT | Mod: ,,, | Performed by: EMERGENCY MEDICINE

## 2022-09-04 PROCEDURE — D9220A PRA ANESTHESIA: ICD-10-PCS | Mod: ANES,,, | Performed by: ANESTHESIOLOGY

## 2022-09-04 PROCEDURE — 36000706: Performed by: UROLOGY

## 2022-09-04 PROCEDURE — 74420 UROGRAPHY RTRGR +-KUB: CPT | Mod: 26,,, | Performed by: UROLOGY

## 2022-09-04 PROCEDURE — 83605 ASSAY OF LACTIC ACID: CPT | Performed by: EMERGENCY MEDICINE

## 2022-09-04 PROCEDURE — 87088 URINE BACTERIA CULTURE: CPT

## 2022-09-04 PROCEDURE — D9220A PRA ANESTHESIA: ICD-10-PCS | Mod: CRNA,,, | Performed by: NURSE ANESTHETIST, CERTIFIED REGISTERED

## 2022-09-04 PROCEDURE — 93005 ELECTROCARDIOGRAM TRACING: CPT

## 2022-09-04 PROCEDURE — 25000003 PHARM REV CODE 250: Performed by: UROLOGY

## 2022-09-04 PROCEDURE — 82962 GLUCOSE BLOOD TEST: CPT

## 2022-09-04 PROCEDURE — 85379 FIBRIN DEGRADATION QUANT: CPT

## 2022-09-04 PROCEDURE — 71000015 HC POSTOP RECOV 1ST HR: Performed by: UROLOGY

## 2022-09-04 PROCEDURE — 86803 HEPATITIS C AB TEST: CPT | Performed by: PHYSICIAN ASSISTANT

## 2022-09-04 PROCEDURE — C1769 GUIDE WIRE: HCPCS | Performed by: UROLOGY

## 2022-09-04 PROCEDURE — 93010 ELECTROCARDIOGRAM REPORT: CPT | Mod: ,,, | Performed by: INTERNAL MEDICINE

## 2022-09-04 PROCEDURE — 83880 ASSAY OF NATRIURETIC PEPTIDE: CPT

## 2022-09-04 PROCEDURE — 25000003 PHARM REV CODE 250: Performed by: STUDENT IN AN ORGANIZED HEALTH CARE EDUCATION/TRAINING PROGRAM

## 2022-09-04 PROCEDURE — C1758 CATHETER, URETERAL: HCPCS | Performed by: UROLOGY

## 2022-09-04 DEVICE — STENT URETERAL UNIV 6FR 26CM: Type: IMPLANTABLE DEVICE | Site: URETER | Status: FUNCTIONAL

## 2022-09-04 RX ORDER — SODIUM CHLORIDE 0.9 % (FLUSH) 0.9 %
3 SYRINGE (ML) INJECTION
Status: DISCONTINUED | OUTPATIENT
Start: 2022-09-04 | End: 2022-09-07 | Stop reason: HOSPADM

## 2022-09-04 RX ORDER — OXYCODONE HYDROCHLORIDE 5 MG/1
5 TABLET ORAL EVERY 4 HOURS PRN
Status: DISCONTINUED | OUTPATIENT
Start: 2022-09-04 | End: 2022-09-07 | Stop reason: HOSPADM

## 2022-09-04 RX ORDER — LIDOCAINE HYDROCHLORIDE 10 MG/ML
INJECTION, SOLUTION INTRAVENOUS
Status: DISCONTINUED | OUTPATIENT
Start: 2022-09-04 | End: 2022-09-04

## 2022-09-04 RX ORDER — OXYBUTYNIN CHLORIDE 5 MG/1
5 TABLET ORAL 3 TIMES DAILY PRN
Status: DISCONTINUED | OUTPATIENT
Start: 2022-09-04 | End: 2022-09-07 | Stop reason: HOSPADM

## 2022-09-04 RX ORDER — SUCCINYLCHOLINE CHLORIDE 20 MG/ML
INJECTION INTRAMUSCULAR; INTRAVENOUS
Status: DISCONTINUED | OUTPATIENT
Start: 2022-09-04 | End: 2022-09-04

## 2022-09-04 RX ORDER — OXYCODONE AND ACETAMINOPHEN 5; 325 MG/1; MG/1
1 TABLET ORAL
Status: DISCONTINUED | OUTPATIENT
Start: 2022-09-04 | End: 2022-09-04 | Stop reason: HOSPADM

## 2022-09-04 RX ORDER — SCOLOPAMINE TRANSDERMAL SYSTEM 1 MG/1
1 PATCH, EXTENDED RELEASE TRANSDERMAL
Status: DISCONTINUED | OUTPATIENT
Start: 2022-09-04 | End: 2022-09-07 | Stop reason: HOSPADM

## 2022-09-04 RX ORDER — MORPHINE SULFATE 4 MG/ML
4 INJECTION, SOLUTION INTRAMUSCULAR; INTRAVENOUS
Status: DISCONTINUED | OUTPATIENT
Start: 2022-09-04 | End: 2022-09-04

## 2022-09-04 RX ORDER — MIDAZOLAM HYDROCHLORIDE 1 MG/ML
INJECTION, SOLUTION INTRAMUSCULAR; INTRAVENOUS
Status: DISCONTINUED | OUTPATIENT
Start: 2022-09-04 | End: 2022-09-04

## 2022-09-04 RX ORDER — PROPOFOL 10 MG/ML
VIAL (ML) INTRAVENOUS
Status: DISCONTINUED | OUTPATIENT
Start: 2022-09-04 | End: 2022-09-04

## 2022-09-04 RX ORDER — FENTANYL CITRATE 50 UG/ML
INJECTION, SOLUTION INTRAMUSCULAR; INTRAVENOUS
Status: DISCONTINUED | OUTPATIENT
Start: 2022-09-04 | End: 2022-09-04

## 2022-09-04 RX ORDER — CALCIUM GLUCONATE 98 MG/ML
INJECTION, SOLUTION INTRAVENOUS
Status: DISCONTINUED | OUTPATIENT
Start: 2022-09-04 | End: 2022-09-04

## 2022-09-04 RX ORDER — LIDOCAINE HYDROCHLORIDE 20 MG/ML
JELLY TOPICAL
Status: DISCONTINUED | OUTPATIENT
Start: 2022-09-04 | End: 2022-09-04 | Stop reason: HOSPADM

## 2022-09-04 RX ORDER — ONDANSETRON 2 MG/ML
8 INJECTION INTRAMUSCULAR; INTRAVENOUS EVERY 6 HOURS PRN
Status: DISCONTINUED | OUTPATIENT
Start: 2022-09-04 | End: 2022-09-07 | Stop reason: HOSPADM

## 2022-09-04 RX ORDER — SODIUM CHLORIDE 9 MG/ML
INJECTION, SOLUTION INTRAVENOUS CONTINUOUS
Status: DISCONTINUED | OUTPATIENT
Start: 2022-09-04 | End: 2022-09-06

## 2022-09-04 RX ORDER — HYDROMORPHONE HYDROCHLORIDE 1 MG/ML
0.2 INJECTION, SOLUTION INTRAMUSCULAR; INTRAVENOUS; SUBCUTANEOUS EVERY 5 MIN PRN
Status: DISCONTINUED | OUTPATIENT
Start: 2022-09-04 | End: 2022-09-04 | Stop reason: HOSPADM

## 2022-09-04 RX ORDER — PHENYLEPHRINE HYDROCHLORIDE 10 MG/ML
INJECTION INTRAVENOUS
Status: DISCONTINUED | OUTPATIENT
Start: 2022-09-04 | End: 2022-09-04

## 2022-09-04 RX ORDER — OXYCODONE HYDROCHLORIDE 10 MG/1
10 TABLET ORAL EVERY 4 HOURS PRN
Status: DISCONTINUED | OUTPATIENT
Start: 2022-09-04 | End: 2022-09-07 | Stop reason: HOSPADM

## 2022-09-04 RX ORDER — DROPERIDOL 2.5 MG/ML
0.62 INJECTION, SOLUTION INTRAMUSCULAR; INTRAVENOUS
Status: DISCONTINUED | OUTPATIENT
Start: 2022-09-04 | End: 2022-09-04

## 2022-09-04 RX ORDER — ACETAMINOPHEN 325 MG/1
650 TABLET ORAL EVERY 6 HOURS PRN
Status: DISCONTINUED | OUTPATIENT
Start: 2022-09-04 | End: 2022-09-07 | Stop reason: HOSPADM

## 2022-09-04 RX ORDER — TALC
6 POWDER (GRAM) TOPICAL NIGHTLY PRN
Status: DISCONTINUED | OUTPATIENT
Start: 2022-09-04 | End: 2022-09-07 | Stop reason: HOSPADM

## 2022-09-04 RX ORDER — ACETAMINOPHEN 500 MG
1000 TABLET ORAL
Status: COMPLETED | OUTPATIENT
Start: 2022-09-04 | End: 2022-09-04

## 2022-09-04 RX ORDER — PROCHLORPERAZINE EDISYLATE 5 MG/ML
5 INJECTION INTRAMUSCULAR; INTRAVENOUS EVERY 6 HOURS PRN
Status: DISCONTINUED | OUTPATIENT
Start: 2022-09-04 | End: 2022-09-07 | Stop reason: HOSPADM

## 2022-09-04 RX ORDER — DEXAMETHASONE SODIUM PHOSPHATE 4 MG/ML
INJECTION, SOLUTION INTRA-ARTICULAR; INTRALESIONAL; INTRAMUSCULAR; INTRAVENOUS; SOFT TISSUE
Status: DISCONTINUED | OUTPATIENT
Start: 2022-09-04 | End: 2022-09-04

## 2022-09-04 RX ORDER — ROCURONIUM BROMIDE 10 MG/ML
INJECTION, SOLUTION INTRAVENOUS
Status: DISCONTINUED | OUTPATIENT
Start: 2022-09-04 | End: 2022-09-04

## 2022-09-04 RX ORDER — ONDANSETRON 2 MG/ML
4 INJECTION INTRAMUSCULAR; INTRAVENOUS
Status: COMPLETED | OUTPATIENT
Start: 2022-09-04 | End: 2022-09-04

## 2022-09-04 RX ORDER — TAMSULOSIN HYDROCHLORIDE 0.4 MG/1
0.4 CAPSULE ORAL NIGHTLY
Status: DISCONTINUED | OUTPATIENT
Start: 2022-09-04 | End: 2022-09-07 | Stop reason: HOSPADM

## 2022-09-04 RX ORDER — ONDANSETRON 2 MG/ML
INJECTION INTRAMUSCULAR; INTRAVENOUS
Status: DISCONTINUED | OUTPATIENT
Start: 2022-09-04 | End: 2022-09-04

## 2022-09-04 RX ORDER — SODIUM CHLORIDE 0.9 % (FLUSH) 0.9 %
10 SYRINGE (ML) INJECTION
Status: DISCONTINUED | OUTPATIENT
Start: 2022-09-04 | End: 2022-09-07 | Stop reason: HOSPADM

## 2022-09-04 RX ORDER — DIPHENHYDRAMINE HCL 25 MG
25 CAPSULE ORAL EVERY 12 HOURS PRN
Status: DISCONTINUED | OUTPATIENT
Start: 2022-09-04 | End: 2022-09-07 | Stop reason: HOSPADM

## 2022-09-04 RX ORDER — ONDANSETRON 2 MG/ML
4 INJECTION INTRAMUSCULAR; INTRAVENOUS DAILY PRN
Status: DISCONTINUED | OUTPATIENT
Start: 2022-09-04 | End: 2022-09-04 | Stop reason: HOSPADM

## 2022-09-04 RX ADMIN — SODIUM CHLORIDE, SODIUM GLUCONATE, SODIUM ACETATE, POTASSIUM CHLORIDE, MAGNESIUM CHLORIDE, SODIUM PHOSPHATE, DIBASIC, AND POTASSIUM PHOSPHATE: .53; .5; .37; .037; .03; .012; .00082 INJECTION, SOLUTION INTRAVENOUS at 06:09

## 2022-09-04 RX ADMIN — DEXAMETHASONE SODIUM PHOSPHATE 8 MG: 4 INJECTION, SOLUTION INTRAMUSCULAR; INTRAVENOUS at 06:09

## 2022-09-04 RX ADMIN — PHENYLEPHRINE HYDROCHLORIDE 200 MCG: 10 INJECTION INTRAVENOUS at 06:09

## 2022-09-04 RX ADMIN — CALCIUM GLUCONATE 0.5 G: 94 INJECTION, SOLUTION INTRAVENOUS at 06:09

## 2022-09-04 RX ADMIN — SODIUM CHLORIDE, SODIUM LACTATE, POTASSIUM CHLORIDE, AND CALCIUM CHLORIDE 1000 ML: .6; .31; .03; .02 INJECTION, SOLUTION INTRAVENOUS at 01:09

## 2022-09-04 RX ADMIN — MIDAZOLAM HYDROCHLORIDE 2 MG: 1 INJECTION, SOLUTION INTRAMUSCULAR; INTRAVENOUS at 06:09

## 2022-09-04 RX ADMIN — CEFTRIAXONE 1 G: 1 INJECTION, SOLUTION INTRAVENOUS at 04:09

## 2022-09-04 RX ADMIN — SODIUM CHLORIDE, SODIUM LACTATE, POTASSIUM CHLORIDE, AND CALCIUM CHLORIDE 1000 ML: .6; .31; .03; .02 INJECTION, SOLUTION INTRAVENOUS at 07:09

## 2022-09-04 RX ADMIN — SUGAMMADEX 200 MG: 100 INJECTION, SOLUTION INTRAVENOUS at 06:09

## 2022-09-04 RX ADMIN — ONDANSETRON 4 MG: 2 INJECTION INTRAMUSCULAR; INTRAVENOUS at 06:09

## 2022-09-04 RX ADMIN — IOHEXOL 75 ML: 350 INJECTION, SOLUTION INTRAVENOUS at 02:09

## 2022-09-04 RX ADMIN — SUCCINYLCHOLINE CHLORIDE 130 MG: 20 INJECTION, SOLUTION INTRAMUSCULAR; INTRAVENOUS at 06:09

## 2022-09-04 RX ADMIN — TAMSULOSIN HYDROCHLORIDE 0.4 MG: 0.4 CAPSULE ORAL at 07:09

## 2022-09-04 RX ADMIN — SODIUM CHLORIDE, SODIUM LACTATE, POTASSIUM CHLORIDE, AND CALCIUM CHLORIDE 1000 ML: .6; .31; .03; .02 INJECTION, SOLUTION INTRAVENOUS at 04:09

## 2022-09-04 RX ADMIN — SCOPALAMINE 1 PATCH: 1 PATCH, EXTENDED RELEASE TRANSDERMAL at 07:09

## 2022-09-04 RX ADMIN — LIDOCAINE HYDROCHLORIDE 50 MG: 10 INJECTION, SOLUTION INTRAVENOUS at 06:09

## 2022-09-04 RX ADMIN — SODIUM CHLORIDE: 0.9 INJECTION, SOLUTION INTRAVENOUS at 07:09

## 2022-09-04 RX ADMIN — PROPOFOL 120 MG: 10 INJECTION, EMULSION INTRAVENOUS at 06:09

## 2022-09-04 RX ADMIN — FENTANYL CITRATE 25 MCG: 50 INJECTION, SOLUTION INTRAMUSCULAR; INTRAVENOUS at 06:09

## 2022-09-04 RX ADMIN — ACETAMINOPHEN 1000 MG: 500 TABLET ORAL at 02:09

## 2022-09-04 RX ADMIN — ROCURONIUM BROMIDE 5 MG: 10 INJECTION, SOLUTION INTRAVENOUS at 06:09

## 2022-09-04 RX ADMIN — POTASSIUM BICARBONATE 50 MEQ: 977.5 TABLET, EFFERVESCENT ORAL at 08:09

## 2022-09-04 RX ADMIN — ROCURONIUM BROMIDE 20 MG: 10 INJECTION, SOLUTION INTRAVENOUS at 06:09

## 2022-09-04 RX ADMIN — ONDANSETRON 4 MG: 2 INJECTION INTRAMUSCULAR; INTRAVENOUS at 01:09

## 2022-09-04 NOTE — ED NOTES
Pt transported to surgery by Dr Simmons via stretcher   Pt's  has pt's belongings , and is with pt going to surgery   Pt condition stable on leaving the ED

## 2022-09-04 NOTE — TRANSFER OF CARE
Anesthesia Transfer of Care Note    Patient: Yenifer Chatterjee    Procedure(s) Performed: Procedure(s) (LRB):  CYSTOSCOPY, WITH URETERAL STENT INSERTION (Right)  PYELOGRAM, RETROGRADE (Right)    Patient location: PACU    Anesthesia Type: general    Transport from OR: Transported from OR on 6-10 L/min O2 by NC with adequate spontaneous ventilation    Post pain: Adequate analgesia    Post assessment: no apparent anesthetic complications, tolerated procedure well and no evidence of recall    Last vitals:   Visit Vitals  BP (!) 112/58 (BP Location: Right arm, Patient Position: Lying)   Pulse (!) 119   Temp 37.1 °C (98.8 °F) (Temporal)   Resp 20   Wt 104.2 kg (229 lb 9.8 oz)   SpO2 100%   Breastfeeding No   BMI 39.41 kg/m²       Post vital signs: stable    Level of consciousness: awake, alert  and oriented    Nausea/Vomiting: no nausea/no vomiting    Complications: none

## 2022-09-04 NOTE — ANESTHESIA PROCEDURE NOTES
Intubation    Date/Time: 9/4/2022 6:11 PM  Performed by: Jose Randolph CRNA  Authorized by: Nasir Tilley Jr., MD     Intubation:     Induction:  Intravenous    Intubated:  Postinduction    Mask Ventilation:  Easy mask    Attempts:  1    Attempted By:  CRNA    Method of Intubation:  Video laryngoscopy    Blade:  Burch 3    Laryngeal View Grade: Grade I - full view of cords      Difficult Airway Encountered?: No      Complications:  None    Airway Device:  Oral endotracheal tube    Airway Device Size:  7.0    Style/Cuff Inflation:  Cuffed    Inflation Amount (mL):  21    Tube secured:  5    Secured at:  The lips    Placement Verified By:  Capnometry and Revisualization with laryngoscopy    Complicating Factors:  None    Findings Post-Intubation:  BS equal bilateral and atraumatic/condition of teeth unchanged

## 2022-09-04 NOTE — ED NOTES
Pt identifiers Yenifer Chatterjee were checked and are correct  LOC: The patient is awake, alert, aware of environment with an appropriate affect. Oriented x4, speaking appropriately  APPEARANCE Pt rates all over body aches a  8/10, in no acute distress, pt is clean and well groomed, clothing properly fastened  SKIN: Skin warm, dry and intact, normal skin turgor, moist mucus membranes  RESPIRATORY: Airway is open and patent, respirations are spontaneous, even and unlabored, normal effort and rate  Breath sounds clear minerva to all lung fields on auscultation  CARDIAC: Normal rate and rhythm, no peripheral edema noted, capillary refill < 3 seconds, bilateral radial pulses 2+  ABDOMEN: Soft, nontender, nondistended. Bowel sounds present to all four quad of abd on auscultation  NEUROLOGIC: PERRL, facial expression is symmetrical, patient moving all extremities spontaneously, normal sensation in all extremities when touched with a finger.  Follows all commands appropriately  MUSCULOSKELETAL: No obvious deformities.

## 2022-09-04 NOTE — SUBJECTIVE & OBJECTIVE
History reviewed. No pertinent past medical history.    History reviewed. No pertinent surgical history.    Review of patient's allergies indicates:  No Known Allergies    Family History       Problem Relation (Age of Onset)    Diabetes Mother, Father    Hypertension Mother, Father            Tobacco Use    Smoking status: Never    Smokeless tobacco: Never   Substance and Sexual Activity    Alcohol use: No    Drug use: No    Sexual activity: Yes     Partners: Male       Review of Systems   Constitutional:  Positive for chills, fatigue and fever. Negative for activity change, appetite change and unexpected weight change.   Respiratory:  Positive for shortness of breath.    Cardiovascular:  Negative for chest pain.   Gastrointestinal:  Positive for abdominal pain, nausea and vomiting. Negative for constipation and diarrhea.   Genitourinary:  Positive for flank pain. Negative for difficulty urinating, dysuria and hematuria.   Musculoskeletal:  Negative for back pain.   Skin:  Negative for wound.   Neurological:  Positive for headaches.   Psychiatric/Behavioral:  Negative for confusion.      Objective:     Temp:  [98 °F (36.7 °C)-98.6 °F (37 °C)] 98.6 °F (37 °C)  Pulse:  [109-111] 109  Resp:  [21-24] 21  SpO2:  [98 %-100 %] 100 %  BP: (106-179)/(62-74) 179/74     Body mass index is 39.41 kg/m².    Date 09/04/22 0700 - 09/05/22 0659   Shift 9516-3965 8314-1693 3096-3556 24 Hour Total   INTAKE   IV Piggyback 999 49.1  1048.1   Shift Total(mL/kg) 999(9.6) 49.1(0.5)  1048.1(10.1)   OUTPUT   Shift Total(mL/kg)       Weight (kg) 104.1 104.1 104.1 104.1          Drains       None                   Physical Exam  Vitals reviewed.   Constitutional:       General: She is not in acute distress.     Appearance: She is ill-appearing. She is not diaphoretic.   HENT:      Head: Normocephalic and atraumatic.      Nose: Nose normal.   Eyes:      Conjunctiva/sclera: Conjunctivae normal.   Cardiovascular:      Rate and Rhythm: Tachycardia  present.   Pulmonary:      Effort: Pulmonary effort is normal. No respiratory distress.   Abdominal:      General: There is no distension.      Palpations: Abdomen is soft.      Tenderness: There is no abdominal tenderness. There is no right CVA tenderness, left CVA tenderness, guarding or rebound.   Musculoskeletal:         General: Normal range of motion.      Cervical back: Normal range of motion.   Skin:     General: Skin is dry.   Neurological:      Mental Status: She is alert.   Psychiatric:         Behavior: Behavior normal.         Thought Content: Thought content normal.       Significant Labs:    BMP:  Recent Labs   Lab 09/04/22  1246   *   K 3.1*      CO2 18*   BUN 18   CREATININE 1.4   CALCIUM 8.7       CBC:  Recent Labs   Lab 09/04/22  1246   WBC 14.65*   HGB 12.1   HCT 35.5*   *       All pertinent labs results from the past 24 hours have been reviewed.    Significant Imaging:  All pertinent imaging results/findings from the past 24 hours have been reviewed.

## 2022-09-04 NOTE — HPI
Patient is a 55 yo F with no known PMH who presents to [facility] ED due to  a few days of SOB, nausea, vomiting, generalized weakness, headache, abdominal pain. Patient states that she went to [place] ED yesterday for evaluation, got 1 L of fluids and IV Zofran and felt significant symptomatic improvement and went home. Her abdominal pain has resolved since visiting the [place] ED. She states that she has had nausea and vomiting since 3 days ago. She also reports subjective fevers. She denies gross hematuria, dysuria, difficulty voiding.     Urology consulted for CTA C/A/P which shows a 9mm right proximal ureteral stone with mild proximal hydronephrosis. There is no left sided hydro or left ureteral stones. Bladder unremarkable. Report states there is no PE. WBC 14.65. Cr 1.4 (unknown baseline). Lactate 2.17. Clean catch UA nitrite negative, 24 RBC, > 100 WBC, many bacteria, 2 SEC. She last ate on Friday. She last drank at 8am.

## 2022-09-04 NOTE — ED TRIAGE NOTES
Pt complains of weakness, intermittent hot and cold , nausea and vomiting, headache starting two days ago

## 2022-09-04 NOTE — ANESTHESIA PREPROCEDURE EVALUATION
[facility]  Anesthesia Pre-Operative Evaluation         Patient Name: Yenifer Chatterjee  YOB: 1965  MRN: [id number]    SUBJECTIVE:     Pre-operative evaluation for Procedure(s) (LRB):  CYSTOSCOPY, WITH URETERAL STENT INSERTION (Right)     09/04/2022    Yenifer Chatterjee is a 56 y.o. female w/ no significant PMHx who presents due to nausea/vomiting and abdominal pain. CT showed 8mm obstructing right ureteral stone with right hydronephrosis.     Patient now presents for the above procedure(s).    Nothing to eat since Friday. 18g left hand and 20g right forearm. Active nausea/vomiting.    TTE: None documented.    LDA:        Peripheral IV - Single Lumen 09/04/22 1532 20 G;1 in Posterior;Right Wrist (Active)   Site Assessment Clean;Dry;Intact;No redness;No swelling 09/04/22 1532   Line Status Blood return noted;Flushed;Saline locked 09/04/22 1532   Dressing Status Clean;Dry;Intact 09/04/22 1532   Dressing Intervention First dressing 09/04/22 1532   Number of days: 0            Peripheral IV - Single Lumen 09/04/22 1406 20 G;1 in Anterior;Proximal;Right Forearm (Active)   Site Assessment Clean;Dry;Intact;No redness;No swelling 09/04/22 1406   Line Status Blood return noted;Flushed;Saline locked 09/04/22 1406   Dressing Status Clean;Dry;Intact 09/04/22 1406   Dressing Intervention First dressing 09/04/22 1406   Number of days: 0       Prev airway: None documented.    Drips: None documented.      Patient Active Problem List   Diagnosis    Right ureteral stone       Review of patient's allergies indicates:  No Known Allergies    Current Inpatient Medications:   droperidoL  0.625 mg Intravenous ED 1 Time    morphine  4 mg Intravenous ED 1 Time    scopolamine  1 patch Transdermal Q3 Days       No current facility-administered medications on file prior to encounter.     Current Outpatient Medications on File Prior to Encounter   Medication Sig Dispense Refill    ondansetron (ZOFRAN-ODT) 4 MG TbDL  Take 1 tablet (4 mg total) by mouth every 6 (six) hours as needed (nausea vomiting). 30 tablet 0       History reviewed. No pertinent surgical history.    OBJECTIVE:     Vital Signs Range (Last 24H):  Temp:  [36.7 °C (98 °F)-37 °C (98.6 °F)]   Pulse:  [109-111]   Resp:  [21-24]   BP: (106-179)/(62-74)   SpO2:  [98 %-100 %]       Significant Labs:  Lab Results   Component Value Date    WBC 14.65 (H) 09/04/2022    HGB 12.1 09/04/2022    HCT 35.5 (L) 09/04/2022     (L) 09/04/2022    ALT 30 09/04/2022    AST 32 09/04/2022     (L) 09/04/2022    K 3.1 (L) 09/04/2022     09/04/2022    CREATININE 1.4 09/04/2022    BUN 18 09/04/2022    CO2 18 (L) 09/04/2022       Diagnostic Studies: No relevant studies.    EKG:   No results found for this or any previous visit.    ASSESSMENT/PLAN:                                                                                                                  09/04/2022  Yenifer Chatterjee is a 56 y.o., female.      Pre-op Assessment    I have reviewed the Patient Summary Reports.     I have reviewed the Nursing Notes. I have reviewed the NPO Status.   I have reviewed the Medications.     Review of Systems  Anesthesia Hx:  No previous Anesthesia  Denies Family Hx of Anesthesia complications.   Denies Personal Hx of Anesthesia complications.   Hematology/Oncology:  Hematology Normal   Oncology Normal     EENT/Dental:EENT/Dental Normal   Cardiovascular:  Cardiovascular Normal     Pulmonary:  Pulmonary Normal    Renal/:   renal calculi    Hepatic/GI:  Hepatic/GI Normal    Musculoskeletal:  Musculoskeletal Normal    Neurological:  Neurology Normal    Endocrine:  Endocrine Normal    Psych:  Psychiatric Normal           Physical Exam  General: Well nourished, Cooperative, Alert and Oriented    Airway:  Mallampati: III   Mouth Opening: Small, but > 3cm  TM Distance: 4 - 6 cm  Tongue: Normal  Neck ROM: Normal ROM    Dental:  Intact, Prominent Incisors        Anesthesia Plan  Type of  Anesthesia, risks & benefits discussed:    Anesthesia Type: Gen ETT  Intra-op Monitoring Plan: Standard ASA Monitors  Post Op Pain Control Plan: multimodal analgesia and IV/PO Opioids PRN  Induction:  IV  Airway Plan: Direct, Post-Induction  Informed Consent: Informed consent signed with the Patient and all parties understand the risks and agree with anesthesia plan.  All questions answered.   ASA Score: 3 Emergent  Day of Surgery Review of History & Physical: H&P Update referred to the surgeon/provider.    Ready For Surgery From Anesthesia Perspective.     .

## 2022-09-04 NOTE — ASSESSMENT & PLAN NOTE
57 yo F with right ureteral stone and UTI.    - To OR for Class A cystoscopy with right ureteral stent placement.   - NPO.  - Consent obtained.  - Case request in.   - Will admit under Urology. If patient decompensates in OR or PACU will call Critical Care Medicine.   - F/u urine culture.   - F/u blood cultures.  - IVF  - Rocephin.  - Antiemetics.

## 2022-09-04 NOTE — ED PROVIDER NOTES
Encounter Date: 9/4/2022       History     Chief Complaint   Patient presents with    Generalized Body Aches     Cough, fatigue, nausea/vomiting. Feeling over all weak, not well.      Patient presents with:  Generalized Body Aches: Cough, fatigue, nausea/vomiting. Feeling over all weak, not well.       Yenifer Chatterjee is a 56 y.o. female presenting to [facility] ED for nausea/vomiting, weakness, headache.  Patient states that she has had nausea and vomiting since 3 days ago.  States that she went to [place] ED for evaluation, got 1 L of fluids and IV Zofran and felt significant symptomatic improvement and went home.  Endorses abdominal pain when she went to [place] ED, states that it has resolved.  Patient was discharged with sublingual Zofran, reports that it has not worked for her.  Reports too many episodes of nonbilious nonbloody emesis to count.  Patient reports increasing generalized weakness since Friday.  She was in the bathtub this morning, was unable to get up with assistance of her  due to the weakness.  Patient endorses severe headache.  Additionally, patient states that she feels like she is gasping for breath while she is satting at 100% on room air.  Patient denies any abdominal surgeries.  Denies any drug/alcohol use.  Patient's last bowel movement was Friday.    Review of patient's allergies indicates:  No Known Allergies  History reviewed. No pertinent past medical history.  History reviewed. No pertinent surgical history.  Family History   Problem Relation Age of Onset    Diabetes Mother     Hypertension Mother     Diabetes Father     Hypertension Father      Social History     Tobacco Use    Smoking status: Never    Smokeless tobacco: Never   Substance Use Topics    Alcohol use: No    Drug use: No     Review of Systems   Constitutional:  Negative for chills and fever.   HENT:  Negative for congestion, ear pain, rhinorrhea and sore throat.    Eyes:  Negative for visual disturbance.   Respiratory:   Positive for cough and shortness of breath. Negative for chest tightness.    Cardiovascular:  Negative for chest pain and leg swelling.   Gastrointestinal:  Positive for abdominal pain, nausea and vomiting. Negative for abdominal distention, constipation and diarrhea.   Endocrine: Negative for polyuria.   Genitourinary:  Negative for dysuria and hematuria.   Musculoskeletal:  Negative for myalgias.   Skin:  Negative for color change and rash.   Neurological:  Positive for headaches. Negative for light-headedness.   Psychiatric/Behavioral:  Negative for decreased concentration. The patient is not nervous/anxious.      Physical Exam     Initial Vitals [09/04/22 1111]   BP Pulse Resp Temp SpO2   106/62 (!) 111 (!) 24 98 °F (36.7 °C) 98 %      MAP       --         Physical Exam    Nursing note and vitals reviewed.  Constitutional: Vital signs are normal. She appears well-developed and well-nourished. She is cooperative.   HENT:   Head: Normocephalic and atraumatic.   Eyes: Conjunctivae and EOM are normal. Pupils are equal, round, and reactive to light.   Neck: Trachea normal and phonation normal. Neck supple. No tracheal deviation present.   Cardiovascular:  Normal rate, regular rhythm, normal heart sounds, intact distal pulses and normal pulses.     Exam reveals no gallop, no S3, no S4 and no friction rub.       No murmur heard.  Pulmonary/Chest: Breath sounds normal. No respiratory distress. She has no wheezes.   Abdominal: Abdomen is soft. She exhibits no distension. There is abdominal tenderness in the right upper quadrant. There is no rebound and negative Jimenez's sign.   Musculoskeletal:      Cervical back: Neck supple.      Comments: Extremities atraumatic x4.  Normal gait.  No clubbing, cyanosis, edema.     Neurological: She is alert and oriented to person, place, and time. No cranial nerve deficit or sensory deficit. GCS eye subscore is 4. GCS verbal subscore is 5. GCS motor subscore is 6.   Skin: Skin is warm,  dry and intact. Capillary refill takes less than 2 seconds.   Psychiatric: She has a normal mood and affect. Her speech is normal and behavior is normal. Thought content normal.       ED Course   Procedures  Labs Reviewed   CBC W/ AUTO DIFFERENTIAL - Abnormal; Notable for the following components:       Result Value    WBC 14.65 (*)     RBC 3.84 (*)     Hematocrit 35.5 (*)     MCH 31.5 (*)     Platelets 118 (*)     Immature Granulocytes 0.8 (*)     Gran # (ANC) 13.1 (*)     Immature Grans (Abs) 0.11 (*)     Lymph # 0.5 (*)     Gran % 89.1 (*)     Lymph % 3.1 (*)     All other components within normal limits   COMPREHENSIVE METABOLIC PANEL - Abnormal; Notable for the following components:    Sodium 132 (*)     Potassium 3.1 (*)     CO2 18 (*)     Glucose 148 (*)     Albumin 2.8 (*)     Total Bilirubin 1.6 (*)     Anion Gap 7 (*)     eGFR 43.6 (*)     All other components within normal limits   URINALYSIS, REFLEX TO URINE CULTURE - Abnormal; Notable for the following components:    Appearance, UA Hazy (*)     Specific Gravity, UA >1.030 (*)     Protein, UA 2+ (*)     Ketones, UA Trace (*)     Occult Blood UA 3+ (*)     Leukocytes, UA 3+ (*)     All other components within normal limits    Narrative:     Specimen Source->Urine   D DIMER, QUANTITATIVE - Abnormal; Notable for the following components:    D-Dimer 9.86 (*)     All other components within normal limits   URINALYSIS MICROSCOPIC - Abnormal; Notable for the following components:    RBC, UA 24 (*)     WBC, UA >100 (*)     Bacteria Many (*)     All other components within normal limits    Narrative:     Specimen Source->Urine   CULTURE, BLOOD   CULTURE, BLOOD   CULTURE, URINE   HIV 1 / 2 ANTIBODY    Narrative:     Release to patient->Immediate   HEPATITIS C ANTIBODY    Narrative:     Release to patient->Immediate   LIPASE   TROPONIN I   B-TYPE NATRIURETIC PEPTIDE   LACTIC ACID, PLASMA          Imaging Results               CT Abdomen Pelvis With Contrast (Final  result)  Result time 09/04/22 14:51:51      Final result by Stephane Hitchcock MD (09/04/22 14:51:51)                   Impression:      This report was flagged in Epic as abnormal.    1. Allowing for exam limitations, no convincing pulmonary thromboembolism, please see above for full details.  2. Moderate right hydroureteronephrosis noting right perinephric and periureteral inflammation secondary to an 8 mm calculus within the proximal right ureter.  Correlation with urinalysis recommended to exclude superimposed infection.  3. Scattered mosaic attenuation of the pulmonary parenchyma, could reflect underlying small airways disease or small vessel disease.  4. Findings suggesting hepatic steatosis, correlation with LFTs recommended.  5. Additional right and left nonobstructive nephrolithiasis.  6. Please see above for several additional findings.      Electronically signed by: Stephane Hitchcock MD  Date:    09/04/2022  Time:    14:51               Narrative:    EXAMINATION:  CTA CHEST NON CORONARY; CT ABDOMEN PELVIS WITH CONTRAST    CLINICAL HISTORY:  Pulmonary embolism (PE) suspected, unknown D-dimer;; Abdominal pain, acute, nonlocalized;    TECHNIQUE:  Axial images of the thorax were obtained at 1.25 mm intervals following administration of 75 cc omni 350 IV contrast following the CTA chest non coronary protocol.  Following this, axial images of the abdomen and pelvis were obtained at 3.75 mm intervals following administration of 75 cc omni 350 IV contrast.  Coronal and sagittal reformatted images were reviewed as well as coronal, sagittal, and axial MIPS reformatted images of the chest..    COMPARISON:  None    FINDINGS:  The structures at the base of the neck are unremarkable.  No significant mediastinal lymphadenopathy.  The heart is not enlarged.  The thoracic aorta tapers normally.    Motion artifact limits evaluation of the airways and pulmonary parenchyma.  Allowing for this, the airways are grossly patent.   There is scattered mosaic attenuation of the pulmonary parenchyma suggesting underlying small airways disease or small vessel disease.  There is bilateral basilar dependent atelectasis.  No large focal consolidation.  No pneumothorax.  No pleural effusion.    Bolus timing is adequate for evaluation of pulmonary thromboembolism however examination is limited secondary to artifact from motion.  Allowing for the above, no convincing pulmonary arterial filling defect to the level of the proximal segmental arteries bilaterally.  Please note, there is significant motion artifact within the bilateral lower lobes, the segmental branches and subsegmental branches cannot be effectively evaluated.  Correlation of these findings with D-dimer and/or lower extremity ultrasound as warranted.    The liver is hypoattenuating suggesting steatosis, correlation with LFTs recommended.  The spleen and adrenal glands are unremarkable.  There is fatty atrophy of the pancreas without pancreatic ductal dilation.  The gallbladder is nondistended, no biliary dilation.  The stomach is decompressed without wall thickening.  There is a small hiatal hernia.  The portal vein, splenic vein, SMV, celiac axis and SMA all are patent.  No significant abdominal lymphadenopathy.    The kidneys enhance asymmetrically noting delayed enhancement of the right kidney as compared to the left.  There are a few scattered low attenuating lesions within the interpolar region of the left kidney, too small for characterization measuring up to 1.1 cm.  There is no left hydronephrosis.  There is left nonobstructive nephrolithiasis.  The left ureter is unremarkable without calculi seen.  There is edematous change of the right kidney noting heterogeneous enhancement and perinephric and periureteral inflammation.  There is mild-to-moderate right hydroureteronephrosis secondary to a 8 mm calculus within the proximal right ureter.  Distally, the ureter returns to normal  caliber without additional calculi seen.  The urinary bladder is unremarkable.  The uterus and adnexa are unremarkable.  No significant free fluid in the pelvis.    The distal large bowel is decompressed.  There are a few scattered colonic diverticula without inflammation.  The terminal ileum is unremarkable.  There are changes of suspected appendectomy.  The small bowel is grossly unremarkable.  There are several scattered shotty periaortic and paracaval lymph nodes.  No focal organized pelvic fluid collection.    Degenerative changes are noted of the spine.  There is osteopenia.  No significant inguinal lymphadenopathy.  No axillary lymphadenopathy.  There are degenerative changes of the bilateral femoroacetabular joints.                                        CTA Chest Non-Coronary - PE Study (Final result)  Result time 09/04/22 14:51:51      Final result by Stephane Hitchcock MD (09/04/22 14:51:51)                   Impression:      This report was flagged in Epic as abnormal.    1. Allowing for exam limitations, no convincing pulmonary thromboembolism, please see above for full details.  2. Moderate right hydroureteronephrosis noting right perinephric and periureteral inflammation secondary to an 8 mm calculus within the proximal right ureter.  Correlation with urinalysis recommended to exclude superimposed infection.  3. Scattered mosaic attenuation of the pulmonary parenchyma, could reflect underlying small airways disease or small vessel disease.  4. Findings suggesting hepatic steatosis, correlation with LFTs recommended.  5. Additional right and left nonobstructive nephrolithiasis.  6. Please see above for several additional findings.      Electronically signed by: Stephane Hitchcock MD  Date:    09/04/2022  Time:    14:51               Narrative:    EXAMINATION:  CTA CHEST NON CORONARY; CT ABDOMEN PELVIS WITH CONTRAST    CLINICAL HISTORY:  Pulmonary embolism (PE) suspected, unknown D-dimer;; Abdominal pain,  acute, nonlocalized;    TECHNIQUE:  Axial images of the thorax were obtained at 1.25 mm intervals following administration of 75 cc omni 350 IV contrast following the CTA chest non coronary protocol.  Following this, axial images of the abdomen and pelvis were obtained at 3.75 mm intervals following administration of 75 cc omni 350 IV contrast.  Coronal and sagittal reformatted images were reviewed as well as coronal, sagittal, and axial MIPS reformatted images of the chest..    COMPARISON:  None    FINDINGS:  The structures at the base of the neck are unremarkable.  No significant mediastinal lymphadenopathy.  The heart is not enlarged.  The thoracic aorta tapers normally.    Motion artifact limits evaluation of the airways and pulmonary parenchyma.  Allowing for this, the airways are grossly patent.  There is scattered mosaic attenuation of the pulmonary parenchyma suggesting underlying small airways disease or small vessel disease.  There is bilateral basilar dependent atelectasis.  No large focal consolidation.  No pneumothorax.  No pleural effusion.    Bolus timing is adequate for evaluation of pulmonary thromboembolism however examination is limited secondary to artifact from motion.  Allowing for the above, no convincing pulmonary arterial filling defect to the level of the proximal segmental arteries bilaterally.  Please note, there is significant motion artifact within the bilateral lower lobes, the segmental branches and subsegmental branches cannot be effectively evaluated.  Correlation of these findings with D-dimer and/or lower extremity ultrasound as warranted.    The liver is hypoattenuating suggesting steatosis, correlation with LFTs recommended.  The spleen and adrenal glands are unremarkable.  There is fatty atrophy of the pancreas without pancreatic ductal dilation.  The gallbladder is nondistended, no biliary dilation.  The stomach is decompressed without wall thickening.  There is a small hiatal  hernia.  The portal vein, splenic vein, SMV, celiac axis and SMA all are patent.  No significant abdominal lymphadenopathy.    The kidneys enhance asymmetrically noting delayed enhancement of the right kidney as compared to the left.  There are a few scattered low attenuating lesions within the interpolar region of the left kidney, too small for characterization measuring up to 1.1 cm.  There is no left hydronephrosis.  There is left nonobstructive nephrolithiasis.  The left ureter is unremarkable without calculi seen.  There is edematous change of the right kidney noting heterogeneous enhancement and perinephric and periureteral inflammation.  There is mild-to-moderate right hydroureteronephrosis secondary to a 8 mm calculus within the proximal right ureter.  Distally, the ureter returns to normal caliber without additional calculi seen.  The urinary bladder is unremarkable.  The uterus and adnexa are unremarkable.  No significant free fluid in the pelvis.    The distal large bowel is decompressed.  There are a few scattered colonic diverticula without inflammation.  The terminal ileum is unremarkable.  There are changes of suspected appendectomy.  The small bowel is grossly unremarkable.  There are several scattered shotty periaortic and paracaval lymph nodes.  No focal organized pelvic fluid collection.    Degenerative changes are noted of the spine.  There is osteopenia.  No significant inguinal lymphadenopathy.  No axillary lymphadenopathy.  There are degenerative changes of the bilateral femoroacetabular joints.                                       CT Head Without Contrast (Final result)  Result time 09/04/22 14:48:40      Final result by Yohan Morgan MD (09/04/22 14:48:40)                   Impression:      No CT evidence of acute intracranial abnormality.      Electronically signed by: Yohan Morgan MD  Date:    09/04/2022  Time:    14:48               Narrative:    EXAMINATION:  CT HEAD WITHOUT  "CONTRAST    CLINICAL HISTORY:  Headache, new or worsening (Age >= 50y);    TECHNIQUE:  Low dose axial images were obtained through the head.  Coronal and sagittal reformations were also performed. Contrast was not administered.    COMPARISON:  None.    FINDINGS:  No evidence of acute territorial infarct, hemorrhage, mass effect, or midline shift.  There are subtle bilateral basal ganglia calcifications.    Ventricles are normal in size and configuration.    No displaced calvarial fracture.    Trace fluid in the left mastoid air cells.  Otherwise, the visualized paranasal sinuses and mastoid air cells are essentially clear.                                       X-Ray Chest AP Portable (Final result)  Result time 09/04/22 13:34:42      Final result by Yohan Morgan MD (09/04/22 13:34:42)                   Impression:      No convincing acute abnormality identified on this rotation limited single view.      Electronically signed by: Yohan Morgan MD  Date:    09/04/2022  Time:    13:34               Narrative:    EXAMINATION:  XR CHEST AP PORTABLE    CLINICAL HISTORY:  Provided history is "short of breath;  ".    TECHNIQUE:  One view of the chest.    COMPARISON:  01/02/2012.    FINDINGS:  Patient is rotated.  Cardiac wires overlie the chest.  Right hemidiaphragm is elevated.  Cardiac silhouette is prominent but stable.  Probable linear subsegmental atelectasis in the left lung base.  No confluent area of consolidation.  No large pleural effusion or pneumothorax.  PA and lateral views would provide improved sensitivity if clinically warranted.                                       Medications   cefTRIAXone (ROCEPHIN) 1 g/50 mL D5W IVPB (has no administration in time range)   lactated ringers bolus 1,000 mL (1,000 mLs Intravenous New Bag 9/4/22 1602)   morphine injection 4 mg (has no administration in time range)   droperidoL injection 0.625 mg (has no administration in time range)   lactated ringers bolus 1,000 " mL (0 mLs Intravenous Stopped 9/4/22 1406)   ondansetron injection 4 mg (4 mg Intravenous Given 9/4/22 1307)   iohexoL (OMNIPAQUE 350) injection 75 mL (75 mLs Intravenous Given 9/4/22 1438)   iohexoL (OMNIPAQUE 350) injection 75 mL (75 mLs Intravenous Given 9/4/22 1437)   acetaminophen tablet 1,000 mg (1,000 mg Oral Given 9/4/22 1447)     Medical Decision Making:   Initial Assessment:   Yenifer Chatterjee is a 56 y.o. female presenting to [facility] ED for nausea/vomiting, weakness, headache.  Initially, patient is tachycardic/tachypneic and appears clinically ill.  Differential Diagnosis:   Atypical presentation of ACS, pneumonia, PE, gastritis, pancreatitis, gallbladder disease, gastroenteritis, bowel obstruction, brain mass.  ED Management:  Concern for bowel obstruction since patient has not had a bowel movement in the last 3 days and has had persistent nausea/vomiting.  Additionally, concern for brain masses patient has had persistent vomiting with a severe headache.  Will evaluate for ACS/pneumonia/PE due to patient's shortness of breath.  Will give patient 1 L of LR for fluid resuscitation and Zofran for nausea/vomiting.  Workup as detailed below in ED course.    Workup concerning for pyelonephritis secondary to kidney stone.  8 mm stone found in the right proximal right ureter.  Will treat with empiric antibiotics, Rocephin.  UA significant for many bacteria and greater than 100 wbc's.  Blood cultures, lactate, another L of LR ordered.  Consulted Urology, they agreed to come see the patient.    Discussed patient's case with , patient pending admission and urology recommendations.          Attending Attestation:   Physician Attestation Statement for Resident:  As the supervising MD   Physician Attestation Statement: I have personally seen and examined this patient.   I agree with the above history.  -:   As the supervising MD I agree with the above PE.     As the supervising MD I agree with the above treatment,  "course, plan, and disposition.                  ED Course as of 09/05/22 1229   Sun Sep 04, 2022   1317 CBC auto differential(!)  Leukocytosis of 14 [ES]   1356 Lipase: 5 [ES]   1356 Troponin I: <0.006 [ES]   1356 BNP: 78 [ES]   1356 D-Dimer(!): 9.86  Will evaluate for PE with CT PE. [ES]   1356 Comprehensive metabolic panel(!)  Mild hyponatremia and hypokalemia.  Decreased GFR at 43.6. [ES]   1515 CT Head Without Contrast  No evidence of intracranial pathology on head CT. [ES]   1548 CT Abdomen Pelvis With Contrast(!)  "Moderate right hydroureteronephrosis noting right perinephric and periureteral inflammation secondary to an 8 mm calculus within the proximal right ureter."Will treat empirically with Rocephin for suspected pyelonephritis. [ES]   1549 CTA Chest Non-Coronary - PE Study(!)  No convincing PE above the proximal segmental arteries bilaterally.  Unable to evaluate for PE below the proximal segmental arteries due to motion artifact. [ES]   1551 X-Ray Chest AP Portable  Subsegmental atelectasis of the left lower lobe. [ES]   1554 Bacteria, UA(!): Many [JR]   1554 WBC, UA(!): >100 [JR]   1600 NITRITE UA: Negative [BP]   1600 WBC, UA(!): >100 [BP]   1600 RBC, UA(!): 24 [BP]   1700 POC Lactate: 2.17 [JR]      ED Course User Index  [BP] Johann Swann MD  [ES] Viridiana Mejia MD  [JR] Joyce Reynolds MD             Clinical Impression:   Final diagnoses:  [R06.02] SOB (shortness of breath)               Viridiana Mejia MD  Resident  09/04/22 1612       Luis Amos MD  09/05/22 1230    "

## 2022-09-04 NOTE — PROVIDER PROGRESS NOTES - EMERGENCY DEPT.
Encounter Date: 9/4/2022    ED Physician Progress Notes        Physician Note:   -------------------------              09/04/22                    1433        -------------------------   BP:       (!) 179/74      Pulse:        109         Resp:       (!) 21        Temp:   98.6 °F (37 °C)  -------------------------     Patient was signed out to me by Dr. Mejia pending urology consult.   I saw and examined the patient.  She appears toxic, tachycardic and hypertensive.  Patient is pending a VBG with point of care lactate and blood cultures.  Discussed the case with Urology, they plan to take her for stent placement and will admit.     
activity

## 2022-09-04 NOTE — H&P
See Consult note dated 09/04/2022 for full H&P.    Guanaco Simmons MD  Urology, PGY-3  [facility]

## 2022-09-04 NOTE — CONSULTS
[facility]  Urology  Consult Note    Patient Name: Yenifer Chatterjee  MRN: [id number]  Admission Date: 9/4/2022  Hospital Length of Stay: 0   Code Status: No Order   Attending Provider: Luis Amos MD   Consulting Provider: Guanaco Simmons MD  Primary Care Physician: Primary Doctor No  Principal Problem:Right ureteral stone    Inpatient consult to Urology  Consult performed by: Guanaco Simmons MD  Consult ordered by: Viridiana Mejia MD          Subjective:     HPI:  Patient is a 55 yo F with no known PMH who presents to [facility] ED due to  a few days of SOB, nausea, vomiting, generalized weakness, headache, abdominal pain. Patient states that she went to [place] ED yesterday for evaluation, got 1 L of fluids and IV Zofran and felt significant symptomatic improvement and went home. Her abdominal pain has resolved since visiting the [place] ED. She states that she has had nausea and vomiting since 3 days ago. She also reports subjective fevers. She denies gross hematuria, dysuria, difficulty voiding.     Urology consulted for CTA C/A/P which shows a 9mm right proximal ureteral stone with mild proximal hydronephrosis. There is no left sided hydro or left ureteral stones. Bladder unremarkable. Report states there is no PE. WBC 14.65. Cr 1.4 (unknown baseline). Lactate 2.17. Clean catch UA nitrite negative, 24 RBC, > 100 WBC, many bacteria, 2 SEC. She last ate on Friday. She last drank at 8am.       History reviewed. No pertinent past medical history.    History reviewed. No pertinent surgical history.    Review of patient's allergies indicates:  No Known Allergies    Family History       Problem Relation (Age of Onset)    Diabetes Mother, Father    Hypertension Mother, Father            Tobacco Use    Smoking status: Never    Smokeless tobacco: Never   Substance and Sexual Activity    Alcohol use: No    Drug use: No    Sexual activity: Yes     Partners: Male       Review of Systems   Constitutional:   Positive for chills, fatigue and fever. Negative for activity change, appetite change and unexpected weight change.   Respiratory:  Positive for shortness of breath.    Cardiovascular:  Negative for chest pain.   Gastrointestinal:  Positive for abdominal pain, nausea and vomiting. Negative for constipation and diarrhea.   Genitourinary:  Positive for flank pain. Negative for difficulty urinating, dysuria and hematuria.   Musculoskeletal:  Negative for back pain.   Skin:  Negative for wound.   Neurological:  Positive for headaches.   Psychiatric/Behavioral:  Negative for confusion.      Objective:     Temp:  [98 °F (36.7 °C)-98.6 °F (37 °C)] 98.6 °F (37 °C)  Pulse:  [109-111] 109  Resp:  [21-24] 21  SpO2:  [98 %-100 %] 100 %  BP: (106-179)/(62-74) 179/74     Body mass index is 39.41 kg/m².    Date 09/04/22 0700 - 09/05/22 0659   Shift 1659-8235 2018-4096 0610-6767 24 Hour Total   INTAKE   IV Piggyback 999 49.1  1048.1   Shift Total(mL/kg) 999(9.6) 49.1(0.5)  1048.1(10.1)   OUTPUT   Shift Total(mL/kg)       Weight (kg) 104.1 104.1 104.1 104.1          Drains       None                   Physical Exam  Vitals reviewed.   Constitutional:       General: She is not in acute distress.     Appearance: She is ill-appearing. She is not diaphoretic.   HENT:      Head: Normocephalic and atraumatic.      Nose: Nose normal.   Eyes:      Conjunctiva/sclera: Conjunctivae normal.   Cardiovascular:      Rate and Rhythm: Tachycardia present.   Pulmonary:      Effort: Pulmonary effort is normal. No respiratory distress.   Abdominal:      General: There is no distension.      Palpations: Abdomen is soft.      Tenderness: There is no abdominal tenderness. There is no right CVA tenderness, left CVA tenderness, guarding or rebound.   Musculoskeletal:         General: Normal range of motion.      Cervical back: Normal range of motion.   Skin:     General: Skin is dry.   Neurological:      Mental Status: She is alert.   Psychiatric:          Behavior: Behavior normal.         Thought Content: Thought content normal.       Significant Labs:    BMP:  Recent Labs   Lab 09/04/22  1246   *   K 3.1*      CO2 18*   BUN 18   CREATININE 1.4   CALCIUM 8.7       CBC:  Recent Labs   Lab 09/04/22  1246   WBC 14.65*   HGB 12.1   HCT 35.5*   *       All pertinent labs results from the past 24 hours have been reviewed.    Significant Imaging:  All pertinent imaging results/findings from the past 24 hours have been reviewed.                      Assessment and Plan:     * Right ureteral stone  55 yo F with right ureteral stone and UTI.    - To OR for Class A cystoscopy with right ureteral stent placement.   - NPO.  - Consent obtained.  - Case request in.   - Will admit under Urology. If patient decompensates in OR or PACU will call Critical Care Medicine.   - F/u urine culture.   - F/u blood cultures.  - IVF  - Rocephin.  - Antiemetics.        VTE Risk Mitigation (From admission, onward)      None            Thank you for your consult. I will follow-up with patient. Please contact us if you have any additional questions.    Guanaco Simmons MD  Urology  [facility]    Patient seen and examined.  CT was reviewed.  Agree with the above.

## 2022-09-05 LAB
ANION GAP SERPL CALC-SCNC: 10 MMOL/L (ref 8–16)
ANISOCYTOSIS BLD QL SMEAR: SLIGHT
BASOPHILS # BLD AUTO: 0.02 K/UL (ref 0–0.2)
BASOPHILS NFR BLD: 0.2 % (ref 0–1.9)
BUN SERPL-MCNC: 17 MG/DL (ref 6–20)
CALCIUM SERPL-MCNC: 8.4 MG/DL (ref 8.7–10.5)
CHLORIDE SERPL-SCNC: 108 MMOL/L (ref 95–110)
CO2 SERPL-SCNC: 25 MMOL/L (ref 23–29)
CREAT SERPL-MCNC: 1.1 MG/DL (ref 0.5–1.4)
DIFFERENTIAL METHOD: ABNORMAL
EOSINOPHIL # BLD AUTO: 0 K/UL (ref 0–0.5)
EOSINOPHIL NFR BLD: 0 % (ref 0–8)
ERYTHROCYTE [DISTWIDTH] IN BLOOD BY AUTOMATED COUNT: 13.2 % (ref 11.5–14.5)
EST. GFR  (NO RACE VARIABLE): 58.2 ML/MIN/1.73 M^2
GLUCOSE SERPL-MCNC: 169 MG/DL (ref 70–110)
HCT VFR BLD AUTO: 32.3 % (ref 37–48.5)
HGB BLD-MCNC: 10.5 G/DL (ref 12–16)
IMM GRANULOCYTES # BLD AUTO: 0.5 K/UL (ref 0–0.04)
IMM GRANULOCYTES NFR BLD AUTO: 4.7 % (ref 0–0.5)
LACTATE SERPL-SCNC: 1.9 MMOL/L (ref 0.5–2.2)
LACTATE SERPL-SCNC: 2.3 MMOL/L (ref 0.5–2.2)
LYMPHOCYTES # BLD AUTO: 0.7 K/UL (ref 1–4.8)
LYMPHOCYTES NFR BLD: 6.7 % (ref 18–48)
MCH RBC QN AUTO: 31.6 PG (ref 27–31)
MCHC RBC AUTO-ENTMCNC: 32.5 G/DL (ref 32–36)
MCV RBC AUTO: 97 FL (ref 82–98)
MONOCYTES # BLD AUTO: 0.5 K/UL (ref 0.3–1)
MONOCYTES NFR BLD: 4.5 % (ref 4–15)
NEUTROPHILS # BLD AUTO: 9 K/UL (ref 1.8–7.7)
NEUTROPHILS NFR BLD: 83.9 % (ref 38–73)
NRBC BLD-RTO: 0 /100 WBC
PLATELET # BLD AUTO: 84 K/UL (ref 150–450)
PLATELET BLD QL SMEAR: ABNORMAL
PMV BLD AUTO: 12.4 FL (ref 9.2–12.9)
POIKILOCYTOSIS BLD QL SMEAR: SLIGHT
POTASSIUM SERPL-SCNC: 4.4 MMOL/L (ref 3.5–5.1)
RBC # BLD AUTO: 3.32 M/UL (ref 4–5.4)
SODIUM SERPL-SCNC: 143 MMOL/L (ref 136–145)
WBC # BLD AUTO: 10.66 K/UL (ref 3.9–12.7)

## 2022-09-05 PROCEDURE — 87040 BLOOD CULTURE FOR BACTERIA: CPT | Mod: 59 | Performed by: STUDENT IN AN ORGANIZED HEALTH CARE EDUCATION/TRAINING PROGRAM

## 2022-09-05 PROCEDURE — 63600175 PHARM REV CODE 636 W HCPCS: Performed by: STUDENT IN AN ORGANIZED HEALTH CARE EDUCATION/TRAINING PROGRAM

## 2022-09-05 PROCEDURE — 83605 ASSAY OF LACTIC ACID: CPT | Mod: 91 | Performed by: UROLOGY

## 2022-09-05 PROCEDURE — 36415 COLL VENOUS BLD VENIPUNCTURE: CPT | Performed by: UROLOGY

## 2022-09-05 PROCEDURE — 36415 COLL VENOUS BLD VENIPUNCTURE: CPT | Performed by: STUDENT IN AN ORGANIZED HEALTH CARE EDUCATION/TRAINING PROGRAM

## 2022-09-05 PROCEDURE — 85025 COMPLETE CBC W/AUTO DIFF WBC: CPT | Performed by: STUDENT IN AN ORGANIZED HEALTH CARE EDUCATION/TRAINING PROGRAM

## 2022-09-05 PROCEDURE — 83605 ASSAY OF LACTIC ACID: CPT | Performed by: STUDENT IN AN ORGANIZED HEALTH CARE EDUCATION/TRAINING PROGRAM

## 2022-09-05 PROCEDURE — 25000003 PHARM REV CODE 250: Performed by: STUDENT IN AN ORGANIZED HEALTH CARE EDUCATION/TRAINING PROGRAM

## 2022-09-05 PROCEDURE — 94799 UNLISTED PULMONARY SVC/PX: CPT

## 2022-09-05 PROCEDURE — 80048 BASIC METABOLIC PNL TOTAL CA: CPT | Performed by: STUDENT IN AN ORGANIZED HEALTH CARE EDUCATION/TRAINING PROGRAM

## 2022-09-05 PROCEDURE — 87186 SC STD MICRODIL/AGAR DIL: CPT | Performed by: STUDENT IN AN ORGANIZED HEALTH CARE EDUCATION/TRAINING PROGRAM

## 2022-09-05 PROCEDURE — 11000001 HC ACUTE MED/SURG PRIVATE ROOM

## 2022-09-05 PROCEDURE — 87077 CULTURE AEROBIC IDENTIFY: CPT | Performed by: STUDENT IN AN ORGANIZED HEALTH CARE EDUCATION/TRAINING PROGRAM

## 2022-09-05 RX ORDER — MUPIROCIN 20 MG/G
OINTMENT TOPICAL 2 TIMES DAILY
Status: DISCONTINUED | OUTPATIENT
Start: 2022-09-05 | End: 2022-09-07 | Stop reason: HOSPADM

## 2022-09-05 RX ADMIN — SODIUM CHLORIDE: 0.9 INJECTION, SOLUTION INTRAVENOUS at 01:09

## 2022-09-05 RX ADMIN — TAMSULOSIN HYDROCHLORIDE 0.4 MG: 0.4 CAPSULE ORAL at 08:09

## 2022-09-05 RX ADMIN — OXYCODONE 5 MG: 5 TABLET ORAL at 02:09

## 2022-09-05 RX ADMIN — SODIUM CHLORIDE: 0.9 INJECTION, SOLUTION INTRAVENOUS at 04:09

## 2022-09-05 RX ADMIN — SODIUM CHLORIDE, SODIUM LACTATE, POTASSIUM CHLORIDE, AND CALCIUM CHLORIDE 1000 ML: .6; .31; .03; .02 INJECTION, SOLUTION INTRAVENOUS at 07:09

## 2022-09-05 RX ADMIN — Medication 6 MG: at 08:09

## 2022-09-05 RX ADMIN — CEFTRIAXONE 1 G: 1 INJECTION, SOLUTION INTRAVENOUS at 01:09

## 2022-09-05 RX ADMIN — MUPIROCIN: 20 OINTMENT TOPICAL at 08:09

## 2022-09-05 RX ADMIN — ACETAMINOPHEN 650 MG: 325 TABLET ORAL at 03:09

## 2022-09-05 RX ADMIN — ACETAMINOPHEN 650 MG: 325 TABLET ORAL at 04:09

## 2022-09-05 NOTE — PROGRESS NOTES
[facility]  Urology  Progress Note    Patient Name: Yenifer Chatterjee  MRN: [id number]  Admission Date: 9/4/2022  Hospital Length of Stay: 1 days  Code Status: Full Code   Attending Provider: No att. providers found   Primary Care Physician: Primary Doctor No    Subjective:     HPI:  Patient is a 57 yo F with no known PMH who presents to [facility] ED due to  a few days of SOB, nausea, vomiting, generalized weakness, headache, abdominal pain. Patient states that she went to [place] ED yesterday for evaluation, got 1 L of fluids and IV Zofran and felt significant symptomatic improvement and went home. Her abdominal pain has resolved since visiting the [place] ED. She states that she has had nausea and vomiting since 3 days ago. She also reports subjective fevers. She denies gross hematuria, dysuria, difficulty voiding.     Urology consulted for CTA C/A/P which shows a 9mm right proximal ureteral stone with mild proximal hydronephrosis. There is no left sided hydro or left ureteral stones. Bladder unremarkable. Report states there is no PE. WBC 14.65. Cr 1.4 (unknown baseline). Lactate 2.17. Clean catch UA nitrite negative, 24 RBC, > 100 WBC, many bacteria, 2 SEC. She last ate on Friday. She last drank at 8am.       Interval History: S/p right ureteral stent placement, bolus x 2, vitals improved overnight, remained afebrile     Review of Systems  Objective:     Temp:  [97.5 °F (36.4 °C)-98.8 °F (37.1 °C)] 97.7 °F (36.5 °C)  Pulse:  [] 89  Resp:  [18-27] 18  SpO2:  [94 %-100 %] 94 %  BP: ()/(44-74) 111/56     Body mass index is 39.41 kg/m².    Date 09/05/22 0700 - 09/06/22 0659   Shift 9522-9900 2336-6073 5237-2405 24 Hour Total   INTAKE   Shift Total(mL/kg)       OUTPUT   Urine(mL/kg/hr) 300   300   Shift Total(mL/kg) 300(2.9)   300(2.9)   Weight (kg) 104.1 104.1 104.1 104.1          Drains       None                   Physical Exam  Constitutional:       Appearance: Normal appearance. She is obese. She is not  ill-appearing or diaphoretic.   HENT:      Head: Normocephalic and atraumatic.   Eyes:      General: No scleral icterus.  Pulmonary:      Effort: Pulmonary effort is normal. No respiratory distress.   Abdominal:      General: Abdomen is flat. There is no distension.   Genitourinary:     Comments: Nunez catheter in place draining cyu   Skin:     General: Skin is warm and dry.   Neurological:      General: No focal deficit present.      Mental Status: She is alert and oriented to person, place, and time.       Significant Labs:    BMP:  Recent Labs   Lab 09/04/22  1246 09/05/22  0320   * 143   K 3.1* 4.4    108   CO2 18* 25   BUN 18 17   CREATININE 1.4 1.1   CALCIUM 8.7 8.4*       CBC:   Recent Labs   Lab 09/04/22  1246 09/05/22  0320   WBC 14.65* 10.66   HGB 12.1 10.5*   HCT 35.5* 32.3*   * 84*       All pertinent labs results from the past 24 hours have been reviewed.    Significant Imaging:  All pertinent imaging results/findings from the past 24 hours have been reviewed.                    Assessment/Plan:     * Right ureteral stone  55 yo F with right ureteral stone and UTI. S/p right ureteral stent placement 9/4. Clinically improving, AF VSS     - regular diet    - bcx growing GNR, on rocephin, will tailor as cultures result   - DC nunez   - ambulation, IS, SCDs   - will need definitive stone treatment after full two week course of culture appropriate antibiotics      VTE Risk Mitigation (From admission, onward)         Ordered     Place SALEEM hose  Until discontinued         09/04/22 1905     IP VTE HIGH RISK PATIENT  Once         09/04/22 1905     Place sequential compression device  Until discontinued         09/04/22 1905                Sophia Suarez MD  Urology  [facility] - Surgery

## 2022-09-05 NOTE — OP NOTE
[facility] Note    Date: 09/04/2022    Pre-Op Diagnosis:   Right ureteral stone and urosepsis    Patient Active Problem List    Diagnosis Date Noted    Right ureteral stone 09/04/2022      Post-Op Diagnosis: same    Procedure(s) Performed:    1. Cystoscopy with right ureteral JJ stent placement  2. Fluoroscopy < 1 h  3. Right retrograde pyelogram  4. Simple urethral catheterization    Specimen(s): none    Staff Surgeon: Ree Pak MD    Assistant Surgeon: Guanaco Simmons MD    Anesthesia: Monitored Local Anesthesia with Sedation    Indications: Yenifer Chatterjee is a 56 y.o. female with right ureteral stone and UTI.    Findings:  Bladder with significant amount of debris.   Pyonephrosis on stent placement.  Right RPG performed showing moderate hydronephrosis.  Stone is radio opaque was at the level between L2 and L3, may have been pushed up to the kidney after placing the wire.     Estimated Blood Loss: min    Drains:  6 Iranian x 26 cm right JJ ureteral stent without strings, (stent was oversized owing to the degree of hydronephrosis.  16 Fr nunez catheter placed.     Procedure in Detail:  After risks, benefits and possible complications of the procedure were explained, the patient elected to undergo the procedure and informed consent was obtained. All questions were answered in the srini-operative area. The patient was transferred to the cystoscopy suite and placed on the fluoroscopy table in the supine position. SCDs were applied and working. Time out was performed, srini-procedural antibiotics were given. Anesthesia was administered.  After adequate anesthesia the patient was placed in dorsal lithotomy position and prepped and draped in the usual sterile fashion.     A rigid cystoscope in a 22 Fr sheath was introduced into the patients bladder per urethra. This passed easily.  The entire urethra was visualized and revealed no strictures or masses.  Cystoscopy was performed which showed the  right and left ureteral orifices in the normal anatomic position.  There was significant debris but no bladder tumors, no  trabeculations, and no stones.      Our attention was turned to the patient's right ureteral orifice.  A 5 Fr open ended ureteral catheter was inserted into the right UO. Using fluoroscopy, a retrograde pyelogram was performed which showed the above findings. The 5 Fr open ended ureteral catheter was advanced to the renal pelvis to ensure correct measurement of the stent. The 5 Fr open ended ureteral catheter was replaced with a wire. This was confirmed using fluoroscopy.     We then passed a 6 Fr x 26 cm JJ ureteral stent without strings over the wire to the level of the renal pelvis under direct vision as well as flouroscopy. The guide wire was removed.  A 180 degree coil was observed in the renal pelvis as well as the bladder using fluoroscopy.  A 180 degree coil was also seen using direct visualization in the bladder.     A 16Fr Camacho catheter was placed in standard fashion.    The patient tolerated the procedure well and was transferred to the recovery room in stable condition.      Disposition: The patient will be admitted to the floor.      MD BRIDGETT Guzman was present for the entire case and agree with the above note.

## 2022-09-05 NOTE — NURSING TRANSFER
Nursing Transfer Note      9/4/2022         Transfer To: 502    Transfer via stretcher    Transfer with IV Fluids    Transported by PCT x1    Chart send with patient: Yes    Notified: spouse

## 2022-09-05 NOTE — ASSESSMENT & PLAN NOTE
55 yo F with right ureteral stone and UTI. S/p right ureteral stent placement 9/4. Clinically improving, AF VSS     - regular diet    - bcx growing GNR, on rocephin, will tailor as cultures result   - DC nunez   - ambulation, IS, SCDs   - will need definitive stone treatment after full two week course of culture appropriate antibiotics

## 2022-09-05 NOTE — PROGRESS NOTES
Pt progressing towards baseline. VSS. Surgical sites CDI. Oriented x4 and following commands. Moving all extremities with equal strength. Pain management ongoing post-operatively. Approaching readiness for transfer.

## 2022-09-05 NOTE — NURSING
Lab notified nurse that patient has positive blood cultures in anaerobic and aerobic bottles for gram negative rods. Will notify team.

## 2022-09-05 NOTE — NURSING
Nurses Note -- 4 Eyes      9/5/2022   4:35 AM      Skin assessed during: Admit      [x] No Pressure Injuries Present    [x]Prevention Measures Documented      [] Yes- Altered Skin Integrity Present or Discovered   [] LDA Added if Not in Epic (Describe Wound)   [] New Altered Skin Integrity was Present on Admit and Documented in LDA   [] Wound Image Taken    Wound Care Consulted? No    Attending Nurse:  Adenike Ross RN     Second RN/Staff Member:  Lupe Max RN

## 2022-09-05 NOTE — SUBJECTIVE & OBJECTIVE
Interval History: S/p right ureteral stent placement, bolus x 2, vitals improved overnight, remained afebrile     Review of Systems  Objective:     Temp:  [97.5 °F (36.4 °C)-98.8 °F (37.1 °C)] 97.7 °F (36.5 °C)  Pulse:  [] 89  Resp:  [18-27] 18  SpO2:  [94 %-100 %] 94 %  BP: ()/(44-74) 111/56     Body mass index is 39.41 kg/m².    Date 09/05/22 0700 - 09/06/22 0659   Shift 1604-6565 7455-5089 2474-2715 24 Hour Total   INTAKE   Shift Total(mL/kg)       OUTPUT   Urine(mL/kg/hr) 300   300   Shift Total(mL/kg) 300(2.9)   300(2.9)   Weight (kg) 104.1 104.1 104.1 104.1          Drains       None                   Physical Exam  Constitutional:       Appearance: Normal appearance. She is obese. She is not ill-appearing or diaphoretic.   HENT:      Head: Normocephalic and atraumatic.   Eyes:      General: No scleral icterus.  Pulmonary:      Effort: Pulmonary effort is normal. No respiratory distress.   Abdominal:      General: Abdomen is flat. There is no distension.   Genitourinary:     Comments: Camacho catheter in place draining cyu   Skin:     General: Skin is warm and dry.   Neurological:      General: No focal deficit present.      Mental Status: She is alert and oriented to person, place, and time.       Significant Labs:    BMP:  Recent Labs   Lab 09/04/22  1246 09/05/22  0320   * 143   K 3.1* 4.4    108   CO2 18* 25   BUN 18 17   CREATININE 1.4 1.1   CALCIUM 8.7 8.4*       CBC:   Recent Labs   Lab 09/04/22  1246 09/05/22  0320   WBC 14.65* 10.66   HGB 12.1 10.5*   HCT 35.5* 32.3*   * 84*       All pertinent labs results from the past 24 hours have been reviewed.    Significant Imaging:  All pertinent imaging results/findings from the past 24 hours have been reviewed.

## 2022-09-06 LAB
ANION GAP SERPL CALC-SCNC: 2 MMOL/L (ref 8–16)
BACTERIA UR CULT: ABNORMAL
BASOPHILS # BLD AUTO: 0.01 K/UL (ref 0–0.2)
BASOPHILS NFR BLD: 0.1 % (ref 0–1.9)
BUN SERPL-MCNC: 18 MG/DL (ref 6–20)
CALCIUM SERPL-MCNC: 8.6 MG/DL (ref 8.7–10.5)
CHLORIDE SERPL-SCNC: 110 MMOL/L (ref 95–110)
CO2 SERPL-SCNC: 23 MMOL/L (ref 23–29)
CREAT SERPL-MCNC: 0.8 MG/DL (ref 0.5–1.4)
DIFFERENTIAL METHOD: ABNORMAL
EOSINOPHIL # BLD AUTO: 0 K/UL (ref 0–0.5)
EOSINOPHIL NFR BLD: 0 % (ref 0–8)
ERYTHROCYTE [DISTWIDTH] IN BLOOD BY AUTOMATED COUNT: 13.3 % (ref 11.5–14.5)
EST. GFR  (NO RACE VARIABLE): >60 ML/MIN/1.73 M^2
GLUCOSE SERPL-MCNC: 146 MG/DL (ref 70–110)
HCT VFR BLD AUTO: 33.9 % (ref 37–48.5)
HGB BLD-MCNC: 11.3 G/DL (ref 12–16)
IMM GRANULOCYTES # BLD AUTO: 0.22 K/UL (ref 0–0.04)
IMM GRANULOCYTES NFR BLD AUTO: 2.1 % (ref 0–0.5)
LYMPHOCYTES # BLD AUTO: 0.5 K/UL (ref 1–4.8)
LYMPHOCYTES NFR BLD: 4.4 % (ref 18–48)
MCH RBC QN AUTO: 31.9 PG (ref 27–31)
MCHC RBC AUTO-ENTMCNC: 33.3 G/DL (ref 32–36)
MCV RBC AUTO: 96 FL (ref 82–98)
MONOCYTES # BLD AUTO: 0.6 K/UL (ref 0.3–1)
MONOCYTES NFR BLD: 5.7 % (ref 4–15)
NEUTROPHILS # BLD AUTO: 9.2 K/UL (ref 1.8–7.7)
NEUTROPHILS NFR BLD: 87.7 % (ref 38–73)
NRBC BLD-RTO: 0 /100 WBC
PLATELET # BLD AUTO: 88 K/UL (ref 150–450)
PLATELET BLD QL SMEAR: ABNORMAL
PMV BLD AUTO: 11.9 FL (ref 9.2–12.9)
POTASSIUM SERPL-SCNC: 4.1 MMOL/L (ref 3.5–5.1)
RBC # BLD AUTO: 3.54 M/UL (ref 4–5.4)
SODIUM SERPL-SCNC: 135 MMOL/L (ref 136–145)
WBC # BLD AUTO: 10.47 K/UL (ref 3.9–12.7)

## 2022-09-06 PROCEDURE — 63600175 PHARM REV CODE 636 W HCPCS: Performed by: STUDENT IN AN ORGANIZED HEALTH CARE EDUCATION/TRAINING PROGRAM

## 2022-09-06 PROCEDURE — 11000001 HC ACUTE MED/SURG PRIVATE ROOM

## 2022-09-06 PROCEDURE — 80048 BASIC METABOLIC PNL TOTAL CA: CPT | Performed by: STUDENT IN AN ORGANIZED HEALTH CARE EDUCATION/TRAINING PROGRAM

## 2022-09-06 PROCEDURE — 36415 COLL VENOUS BLD VENIPUNCTURE: CPT | Performed by: STUDENT IN AN ORGANIZED HEALTH CARE EDUCATION/TRAINING PROGRAM

## 2022-09-06 PROCEDURE — 25000003 PHARM REV CODE 250: Performed by: STUDENT IN AN ORGANIZED HEALTH CARE EDUCATION/TRAINING PROGRAM

## 2022-09-06 PROCEDURE — 94761 N-INVAS EAR/PLS OXIMETRY MLT: CPT

## 2022-09-06 PROCEDURE — 99223 1ST HOSP IP/OBS HIGH 75: CPT | Mod: ,,, | Performed by: INTERNAL MEDICINE

## 2022-09-06 PROCEDURE — 85025 COMPLETE CBC W/AUTO DIFF WBC: CPT | Performed by: STUDENT IN AN ORGANIZED HEALTH CARE EDUCATION/TRAINING PROGRAM

## 2022-09-06 PROCEDURE — 99223 PR INITIAL HOSPITAL CARE,LEVL III: ICD-10-PCS | Mod: ,,, | Performed by: INTERNAL MEDICINE

## 2022-09-06 RX ADMIN — MUPIROCIN: 20 OINTMENT TOPICAL at 09:09

## 2022-09-06 RX ADMIN — TAMSULOSIN HYDROCHLORIDE 0.4 MG: 0.4 CAPSULE ORAL at 09:09

## 2022-09-06 RX ADMIN — ACETAMINOPHEN 650 MG: 325 TABLET ORAL at 01:09

## 2022-09-06 RX ADMIN — ACETAMINOPHEN 650 MG: 325 TABLET ORAL at 08:09

## 2022-09-06 RX ADMIN — MUPIROCIN: 20 OINTMENT TOPICAL at 08:09

## 2022-09-06 RX ADMIN — CEFTRIAXONE 2 G: 2 INJECTION, SOLUTION INTRAVENOUS at 01:09

## 2022-09-06 RX ADMIN — ACETAMINOPHEN 650 MG: 325 TABLET ORAL at 02:09

## 2022-09-06 RX ADMIN — OXYCODONE 5 MG: 5 TABLET ORAL at 07:09

## 2022-09-06 NOTE — PLAN OF CARE
Problem: Infection  Goal: Absence of Infection Signs and Symptoms  Outcome: Ongoing, Progressing     Problem: Adult Inpatient Plan of Care  Goal: Plan of Care Review  Outcome: Ongoing, Progressing  Goal: Patient-Specific Goal (Individualized)  Outcome: Ongoing, Progressing  Goal: Absence of Hospital-Acquired Illness or Injury  Outcome: Ongoing, Progressing  Goal: Optimal Comfort and Wellbeing  Outcome: Ongoing, Progressing  Goal: Readiness for Transition of Care  Outcome: Ongoing, Progressing     Problem: UTI (Urinary Tract Infection)  Goal: Improved Infection Symptoms  9/6/2022 0715 by Son Renee RN  Outcome: Ongoing, Progressing  9/6/2022 0715 by Son Renee RN  Outcome: Ongoing, Progressing     Problem: Pain Acute  Goal: Acceptable Pain Control and Functional Ability  Outcome: Ongoing, Progressing

## 2022-09-06 NOTE — ASSESSMENT & PLAN NOTE
57 yo F with right ureteral stone and UTI. S/p right ureteral stent placement 9/4. Clinically improving    - regular diet    - dc mIVF  - bcx growing GNR, on rocephin, will tailor as cultures result   - ID consult for GNR bacteremia.   - ambulation, IS, SCDs   - will need definitive stone treatment after full two week course of culture appropriate antibiotics

## 2022-09-06 NOTE — CONSULTS
[facility]  Infectious Disease  Consult Note    Patient Name: Yenifer Chatterjee  MRN: [id number]  Admission Date: 9/4/2022  Hospital Length of Stay: 2 days  Attending Physician: Ree Pak MD  Primary Care Provider: Primary Doctor No     Isolation Status: No active isolations    Patient information was obtained from patient, past medical records, ER records and primary team.      Inpatient consult to Infectious Diseases  Consult performed by: Jori Edwards DO  Consult ordered by: Cuco Bautista MD          Assessment/Plan:     * Right ureteral stone  56 y.o F w/ no known PMH who [place] [facility] [place], abdominal symptoms with subjective fevers. Imaging showed concerns for  9mm right proximal ureteral stone with mild proximal hydronephrosis and Urology placed right ureteral stent on 09/04 with improvement in symptoms. Blood cultures grew gram negative rods and patient was started on Ceftriaxone with plans for stone treatment after full two week course of culture appropriate antibiotics. Infectious Diseases consulted for GNR bacteremia.    -Leukocytosis resolved, patient remains afebrile with improvement in symptoms.  -BC x2 growing GNR(Identification and susceptibility pending). Repeat BC NGTD.  -UC grew Klebsiella pneumoniae    Antibiotic Interpretation Value  Comment    Amp/Sulbactam Sensitive <=8/4 mcg/mL     Amox/K Clav'ate Sensitive <=8/4 mcg/mL     Ceftriaxone Sensitive <=1 mcg/mL     Cefazolin Sensitive <=2 mcg/mL     Ciprofloxacin Sensitive <=1 mcg/mL     Cefepime Sensitive <=2 mcg/mL     Ertapenem Sensitive <=0.5 mcg/mL     Nitrofurantoin Sensitive <=32 mcg/mL     Gentamicin Sensitive <=4 mcg/mL     Levofloxacin Sensitive <=2 mcg/mL     Meropenem Sensitive <=1 mcg/mL     Piperacillin/Tazo Sensitive <=16 mcg/mL     Trimeth/Sulfa Sensitive <=2/38 mcg/mL     Tobramycin Sensitive <=4 mcg/mL         RECOMMENDATIONS  -Discontinue Ceftriaxone.   -Start Ciprofloxacin 750mg  BID until stent removal. Minimum therapy duration of atleast 14 days.  -Followup Blood cultures. Please contact ID if blood cultures resistant to Ciprofloxacin.      Thank you for your consult. I will sign off. Please contact us if you have any additional questions.    Jori Edwards DO  Infectious Disease  [facility]    Subjective:     Principal Problem: Right ureteral stone    HPI: Ms. Chatterjee is a 56 y.o F w/ no known PMH who [place] [facility] [place], abdominal symptoms with subjective fevers. Imaging showed concerns for  9mm right proximal ureteral stone with mild proximal hydronephrosis and Urology placed right ureteral stent on 09/04 with improvement in symptoms. Blood cultures grew gram negative rods and patient was started on Ceftriaxone with plans for stone treatment after full two week course of culture appropriate antibiotics. Infectious Diseases consulted for GNR bacteremia.      History reviewed. No pertinent past medical history.    Past Surgical History:   Procedure Laterality Date    CYSTOSCOPY W/ URETERAL STENT PLACEMENT Right 9/4/2022    Procedure: CYSTOSCOPY, WITH URETERAL STENT INSERTION;  Surgeon: Ree Pak MD;  Location: [facility] OR [address] [place] [address];  Service: Urology;  Laterality: Right;    RETROGRADE PYELOGRAPHY Right 9/4/2022    Procedure: PYELOGRAM, RETROGRADE;  Surgeon: Ree Pak MD;  Location: [facility] OR [address] [place] [address];  Service: Urology;  Laterality: Right;       Review of patient's allergies indicates:  No Known Allergies    Medications:  Medications Prior to Admission   Medication Sig    ondansetron (ZOFRAN-ODT) 4 MG TbDL Take 1 tablet (4 mg total) by mouth every 6 (six) hours as needed (nausea vomiting).     Antibiotics (From admission, onward)      Start     Stop Route Frequency Ordered    09/06/22 1400  cefTRIAXone (ROCEPHIN) 2 g/50 mL D5W IVPB         -- IV Every 24 hours (non-standard times) 09/06/22 1006    09/05/22 0900  mupirocin 2 % ointment          09/10 0859 Nasl 2 times daily 09/05/22 0717          Antifungals (From admission, onward)      None          Antivirals (From admission, onward)      None               There is no immunization history on file for this patient.    Family History       Problem Relation (Age of Onset)    Diabetes Mother, Father    Hypertension Mother, Father          Social History     Socioeconomic History    Marital status:    Tobacco Use    Smoking status: Never    Smokeless tobacco: Never   Substance and Sexual Activity    Alcohol use: No    Drug use: No    Sexual activity: Yes     Partners: Male     Review of Systems   Constitutional:  Positive for fatigue. Negative for chills and fever.   HENT:  Positive for sore throat. Negative for congestion and rhinorrhea.    Eyes:  Negative for discharge and visual disturbance.   Respiratory:  Positive for cough. Negative for shortness of breath.    Cardiovascular:  Negative for chest pain and leg swelling.   Gastrointestinal:  Positive for abdominal pain. Negative for constipation, diarrhea, nausea and vomiting.   Genitourinary:  Negative for difficulty urinating and dysuria.   Musculoskeletal:  Negative for arthralgias and back pain.   Skin:  Negative for rash and wound.   Neurological:  Negative for dizziness and headaches.   Objective:     Vital Signs (Most Recent):  Temp: 98.2 °F (36.8 °C) (09/06/22 1622)  Pulse: 106 (09/06/22 1622)  Resp: 18 (09/06/22 1622)  BP: (!) 109/57 (09/06/22 1622)  SpO2: 95 % (09/06/22 1622) Vital Signs (24h Range):  Temp:  [97.5 °F (36.4 °C)-100.1 °F (37.8 °C)] 98.2 °F (36.8 °C)  Pulse:  [] 106  Resp:  [18] 18  SpO2:  [92 %-96 %] 95 %  BP: (105-142)/(53-74) 109/57     Weight: 104.2 kg (229 lb 9.8 oz)  Body mass index is 39.41 kg/m².    CrCl cannot be calculated (Unknown ideal weight.).    Physical Exam  Vitals and nursing note reviewed.   Constitutional:       General: She is not in acute distress.     Appearance: She is not  ill-appearing, toxic-appearing or diaphoretic.   HENT:      Head: Normocephalic and atraumatic.      Mouth/Throat:      Mouth: Mucous membranes are moist.   Eyes:      General: No scleral icterus.        Right eye: No discharge.         Left eye: No discharge.   Cardiovascular:      Rate and Rhythm: Normal rate and regular rhythm.      Heart sounds: Normal heart sounds.   Pulmonary:      Effort: Pulmonary effort is normal. No respiratory distress.      Breath sounds: Normal breath sounds.   Abdominal:      General: Abdomen is flat. Bowel sounds are normal. There is no distension.      Palpations: Abdomen is soft.      Tenderness: There is no abdominal tenderness.   Musculoskeletal:      Cervical back: No rigidity.      Right lower leg: No edema.      Left lower leg: No edema.   Skin:     General: Skin is warm and dry.      Coloration: Skin is not jaundiced.   Neurological:      Mental Status: She is oriented to person, place, and time. Mental status is at baseline.   Psychiatric:         Mood and Affect: Mood normal.         Behavior: Behavior normal.     Significant Labs:   CBC:   Recent Labs   Lab 09/05/22 0320 09/06/22 0226   WBC 10.66 10.47   HGB 10.5* 11.3*   HCT 32.3* 33.9*   PLT 84* 88*     CMP:   Recent Labs   Lab 09/05/22 0320 09/06/22 0226    135*   K 4.4 4.1    110   CO2 25 23   * 146*   BUN 17 18   CREATININE 1.1 0.8   CALCIUM 8.4* 8.6*   ANIONGAP 10 2*       Microbiology Results (last 7 days)       Procedure Component Value Units Date/Time    Blood culture [[id number]] Collected: 09/05/22 1234    Order Status: Completed Specimen: Blood Updated: 09/06/22 1412     Blood Culture, Routine No Growth to date      No Growth to date    Blood culture [[id number]] Collected: 09/05/22 1234    Order Status: Completed Specimen: Blood Updated: 09/06/22 1412     Blood Culture, Routine No Growth to date      No Growth to date    Urine culture [[id number]]  (Abnormal)  (Susceptibility) Collected:  09/04/22 1517    Order Status: Completed Specimen: Urine Updated: 09/06/22 1052     Urine Culture, Routine KLEBSIELLA PNEUMONIAE  >100,000 cfu/ml      Narrative:      Specimen Source->Urine    Blood culture #2 **CANNOT BE ORDERED STAT** [[id number]]  (Abnormal) Collected: 09/04/22 1552    Order Status: Completed Specimen: Blood from Peripheral, Wrist, Left Updated: 09/06/22 0921     Blood Culture, Routine Gram stain aer bottle: Gram negative rods      Gram stain alta bottle: Gram negative rods      Results called to and read back by: Adenike Ross RN 09/05/2022  07:02      GRAM NEGATIVE MOI  Identification pending  For susceptibility see order #[id number]      Blood culture #1 **CANNOT BE ORDERED STAT** [[id number]]  (Abnormal) Collected: 09/04/22 1532    Order Status: Completed Specimen: Blood from Peripheral, Wrist, Right Updated: 09/06/22 0919     Blood Culture, Routine Gram stain aer bottle: Gram negative rods      Gram stain alta bottle: Gram negative rods      Results called to and read back by: Adenike Ross RN 09/05/2022  07:02      GRAM NEGATIVE MOI  Identification and susceptibility pending                  Significant Labs: All pertinent labs within the past 24 hours have been reviewed.    Significant Imaging: I have reviewed all pertinent imaging results/findings within the past 24 hours.    Inpatient Medications:    Scheduled Meds:   cefTRIAXone (ROCEPHIN) IVPB  2 g Intravenous Q24H    mupirocin   Nasal BID    scopolamine  1 patch Transdermal Q3 Days    tamsulosin  0.4 mg Oral QHS     PRN Meds:acetaminophen, diphenhydrAMINE, melatonin, ondansetron, oxybutynin, oxyCODONE, oxyCODONE, prochlorperazine, sodium chloride 0.9%, sodium chloride 0.9%

## 2022-09-06 NOTE — SUBJECTIVE & OBJECTIVE
History reviewed. No pertinent past medical history.    Past Surgical History:   Procedure Laterality Date    CYSTOSCOPY W/ URETERAL STENT PLACEMENT Right 9/4/2022    Procedure: CYSTOSCOPY, WITH URETERAL STENT INSERTION;  Surgeon: Ree Pak MD;  Location: [address];  Service: Urology;  Laterality: Right;    RETROGRADE PYELOGRAPHY Right 9/4/2022    Procedure: PYELOGRAM, RETROGRADE;  Surgeon: Ree Pak MD;  Location: [address];  Service: Urology;  Laterality: Right;       Review of patient's allergies indicates:  No Known Allergies    Medications:  Medications Prior to Admission   Medication Sig    ondansetron (ZOFRAN-ODT) 4 MG TbDL Take 1 tablet (4 mg total) by mouth every 6 (six) hours as needed (nausea vomiting).     Antibiotics (From admission, onward)      Start     Stop Route Frequency Ordered    09/06/22 1400  cefTRIAXone (ROCEPHIN) 2 g/50 mL D5W IVPB         -- IV Every 24 hours (non-standard times) 09/06/22 1006    09/05/22 0900  mupirocin 2 % ointment         09/10 0859 Nasl 2 times daily 09/05/22 0717          Antifungals (From admission, onward)      None          Antivirals (From admission, onward)      None               There is no immunization history on file for this patient.    Family History       Problem Relation (Age of Onset)    Diabetes Mother, Father    Hypertension Mother, Father          Social History     Socioeconomic History    Marital status:    Tobacco Use    Smoking status: Never    Smokeless tobacco: Never   Substance and Sexual Activity    Alcohol use: No    Drug use: No    Sexual activity: Yes     Partners: Male     Review of Systems   Constitutional:  Positive for fatigue. Negative for chills and fever.   HENT:  Positive for sore throat. Negative for congestion and rhinorrhea.    Eyes:  Negative for discharge and visual disturbance.   Respiratory:  Positive for cough. Negative for shortness of breath.    Cardiovascular:  Negative for chest pain  and leg swelling.   Gastrointestinal:  Positive for abdominal pain. Negative for constipation, diarrhea, nausea and vomiting.   Genitourinary:  Negative for difficulty urinating and dysuria.   Musculoskeletal:  Negative for arthralgias and back pain.   Skin:  Negative for rash and wound.   Neurological:  Negative for dizziness and headaches.   Objective:     Vital Signs (Most Recent):  Temp: 98.2 °F (36.8 °C) (09/06/22 1622)  Pulse: 106 (09/06/22 1622)  Resp: 18 (09/06/22 1622)  BP: (!) 109/57 (09/06/22 1622)  SpO2: 95 % (09/06/22 1622) Vital Signs (24h Range):  Temp:  [97.5 °F (36.4 °C)-100.1 °F (37.8 °C)] 98.2 °F (36.8 °C)  Pulse:  [] 106  Resp:  [18] 18  SpO2:  [92 %-96 %] 95 %  BP: (105-142)/(53-74) 109/57     Weight: 104.2 kg (229 lb 9.8 oz)  Body mass index is 39.41 kg/m².    CrCl cannot be calculated (Unknown ideal weight.).    Physical Exam  Vitals and nursing note reviewed.   Constitutional:       General: She is not in acute distress.     Appearance: She is not ill-appearing, toxic-appearing or diaphoretic.   HENT:      Head: Normocephalic and atraumatic.      Mouth/Throat:      Mouth: Mucous membranes are moist.   Eyes:      General: No scleral icterus.        Right eye: No discharge.         Left eye: No discharge.   Cardiovascular:      Rate and Rhythm: Normal rate and regular rhythm.      Heart sounds: Normal heart sounds.   Pulmonary:      Effort: Pulmonary effort is normal. No respiratory distress.      Breath sounds: Normal breath sounds.   Abdominal:      General: Abdomen is flat. Bowel sounds are normal. There is no distension.      Palpations: Abdomen is soft.      Tenderness: There is no abdominal tenderness.   Musculoskeletal:      Cervical back: No rigidity.      Right lower leg: No edema.      Left lower leg: No edema.   Skin:     General: Skin is warm and dry.      Coloration: Skin is not jaundiced.   Neurological:      Mental Status: She is oriented to person, place, and time. Mental  status is at baseline.   Psychiatric:         Mood and Affect: Mood normal.         Behavior: Behavior normal.     Significant Labs:   CBC:   Recent Labs   Lab 09/05/22  0320 09/06/22 0226   WBC 10.66 10.47   HGB 10.5* 11.3*   HCT 32.3* 33.9*   PLT 84* 88*     CMP:   Recent Labs   Lab 09/05/22  0320 09/06/22 0226    135*   K 4.4 4.1    110   CO2 25 23   * 146*   BUN 17 18   CREATININE 1.1 0.8   CALCIUM 8.4* 8.6*   ANIONGAP 10 2*       Microbiology Results (last 7 days)       Procedure Component Value Units Date/Time    Blood culture [[id number]] Collected: 09/05/22 1234    Order Status: Completed Specimen: Blood Updated: 09/06/22 1412     Blood Culture, Routine No Growth to date      No Growth to date    Blood culture [[id number]] Collected: 09/05/22 1234    Order Status: Completed Specimen: Blood Updated: 09/06/22 1412     Blood Culture, Routine No Growth to date      No Growth to date    Urine culture [[id number]]  (Abnormal)  (Susceptibility) Collected: 09/04/22 1517    Order Status: Completed Specimen: Urine Updated: 09/06/22 1052     Urine Culture, Routine KLEBSIELLA PNEUMONIAE  >100,000 cfu/ml      Narrative:      Specimen Source->Urine    Blood culture #2 **CANNOT BE ORDERED STAT** [[id number]]  (Abnormal) Collected: 09/04/22 1552    Order Status: Completed Specimen: Blood from Peripheral, Wrist, Left Updated: 09/06/22 0921     Blood Culture, Routine Gram stain aer bottle: Gram negative rods      Gram stain alta bottle: Gram negative rods      Results called to and read back by: Adenike Ross RN 09/05/2022  07:02      GRAM NEGATIVE MOI  Identification pending  For susceptibility see order #[id number]      Blood culture #1 **CANNOT BE ORDERED STAT** [[id number]]  (Abnormal) Collected: 09/04/22 1532    Order Status: Completed Specimen: Blood from Peripheral, Wrist, Right Updated: 09/06/22 0919     Blood Culture, Routine Gram stain aer bottle: Gram negative rods      Gram stain alta bottle: Gram  negative rods      Results called to and read back by: Adenike Ross RN 09/05/2022  07:02      GRAM NEGATIVE MOI  Identification and susceptibility pending                  Significant Labs: All pertinent labs within the past 24 hours have been reviewed.    Significant Imaging: I have reviewed all pertinent imaging results/findings within the past 24 hours.    Inpatient Medications:    Scheduled Meds:   cefTRIAXone (ROCEPHIN) IVPB  2 g Intravenous Q24H    mupirocin   Nasal BID    scopolamine  1 patch Transdermal Q3 Days    tamsulosin  0.4 mg Oral QHS     PRN Meds:acetaminophen, diphenhydrAMINE, melatonin, ondansetron, oxybutynin, oxyCODONE, oxyCODONE, prochlorperazine, sodium chloride 0.9%, sodium chloride 0.9%

## 2022-09-06 NOTE — ASSESSMENT & PLAN NOTE
56 y.o F w/ no known PMH who [place] [facility] [place] of [place], abdominal symptoms with subjective fevers. Imaging showed concerns for  9mm right proximal ureteral stone with mild proximal hydronephrosis and Urology placed right ureteral stent on 09/04 with improvement in symptoms. Blood cultures grew gram negative rods and patient was started on Ceftriaxone with plans for stone treatment after full two week course of culture appropriate antibiotics. Infectious Diseases consulted for GNR bacteremia.    -Leukocytosis resolved, patient remains afebrile with improvement in symptoms.  -BC x2 growing GNR(Identification and susceptibility pending). Repeat BC NGTD.  -UC grew Klebsiella pneumoniae    Antibiotic Interpretation Value  Comment    Amp/Sulbactam Sensitive <=8/4 mcg/mL     Amox/K Clav'ate Sensitive <=8/4 mcg/mL     Ceftriaxone Sensitive <=1 mcg/mL     Cefazolin Sensitive <=2 mcg/mL     Ciprofloxacin Sensitive <=1 mcg/mL     Cefepime Sensitive <=2 mcg/mL     Ertapenem Sensitive <=0.5 mcg/mL     Nitrofurantoin Sensitive <=32 mcg/mL     Gentamicin Sensitive <=4 mcg/mL     Levofloxacin Sensitive <=2 mcg/mL     Meropenem Sensitive <=1 mcg/mL     Piperacillin/Tazo Sensitive <=16 mcg/mL     Trimeth/Sulfa Sensitive <=2/38 mcg/mL     Tobramycin Sensitive <=4 mcg/mL         RECOMMENDATIONS  -Discontinue Ceftriaxone.   -Start Ciprofloxacin 750mg BID until stent removal. Minimum therapy duration of atleast 14 days.  -Followup Blood cultures. Please contact ID if blood cultures resistant to Ciprofloxacin.

## 2022-09-06 NOTE — PROGRESS NOTES
[facility]  Urology  Progress Note    Patient Name: Yenifer Chatterjee  MRN: [id number]  Admission Date: 9/4/2022  Hospital Length of Stay: 2 days  Code Status: Full Code   Attending Provider: No att. providers found   Primary Care Physician: Primary Doctor No    Subjective:     HPI:  Patient is a 55 yo F with no known PMH who presents to [facility] ED due to  a few days of SOB, nausea, vomiting, generalized weakness, headache, abdominal pain. Patient states that she went to [place] ED yesterday for evaluation, got 1 L of fluids and IV Zofran and felt significant symptomatic improvement and went home. Her abdominal pain has resolved since visiting the [place] ED. She states that she has had nausea and vomiting since 3 days ago. She also reports subjective fevers. She denies gross hematuria, dysuria, difficulty voiding.     Urology consulted for CTA C/A/P which shows a 9mm right proximal ureteral stone with mild proximal hydronephrosis. There is no left sided hydro or left ureteral stones. Bladder unremarkable. Report states there is no PE. WBC 14.65. Cr 1.4 (unknown baseline). Lactate 2.17. Clean catch UA nitrite negative, 24 RBC, > 100 WBC, many bacteria, 2 SEC. She last ate on Friday. She last drank at 8am.       Interval History: AFVSS. NAEON. Complains of frequent urination. States she thinks she has bronchitis.     Objective:     Temp:  [97.3 °F (36.3 °C)-99.1 °F (37.3 °C)] 97.5 °F (36.4 °C)  Pulse:  [] 107  Resp:  [18] 18  SpO2:  [92 %-96 %] 92 %  BP: (104-123)/(53-73) 105/53     Body mass index is 39.41 kg/m².      Bladder Scan Volume (mL): 22 mL (09/05/22 1300)    Drains       None                   Physical Exam  Constitutional:       Appearance: Normal appearance. She is obese. She is not ill-appearing or diaphoretic.   HENT:      Head: Normocephalic and atraumatic.   Eyes:      General: No scleral icterus.  Pulmonary:      Effort: Pulmonary effort is normal. No respiratory distress.      Breath sounds: No  wheezing. No rales or rhonchi.   Abdominal:      General: Abdomen is flat. There is no distension.   Skin:     General: Skin is warm and dry.   Neurological:      General: No focal deficit present.      Mental Status: She is alert and oriented to person, place, and time.       Significant Labs:    BMP:  Recent Labs   Lab 09/04/22  1246 09/05/22  0320 09/06/22 0226   * 143 135*   K 3.1* 4.4 4.1    108 110   CO2 18* 25 23   BUN 18 17 18   CREATININE 1.4 1.1 0.8   CALCIUM 8.7 8.4* 8.6*       CBC:   Recent Labs   Lab 09/04/22  1246 09/05/22  0320 09/06/22 0226   WBC 14.65* 10.66 10.47   HGB 12.1 10.5* 11.3*   HCT 35.5* 32.3* 33.9*   * 84* 88*       All pertinent labs results from the past 24 hours have been reviewed.    Significant Imaging:  All pertinent imaging results/findings from the past 24 hours have been reviewed.                    Assessment/Plan:     * Right ureteral stone  57 yo F with right ureteral stone and UTI. S/p right ureteral stent placement 9/4. Clinically improving    - regular diet    - dc mIVF  - bcx growing GNR, on rocephin, will tailor as cultures result   - ID consult for GNR bacteremia.   - ambulation, IS, SCDs   - will need definitive stone treatment after full two week course of culture appropriate antibiotics        VTE Risk Mitigation (From admission, onward)           Ordered     Place SALEEM hose  Until discontinued         09/04/22 1905     IP VTE HIGH RISK PATIENT  Once         09/04/22 1905     Place sequential compression device  Until discontinued         09/04/22 1905                    KAREN TERAN MD  Urology  [facility]

## 2022-09-06 NOTE — ANESTHESIA POSTPROCEDURE EVALUATION
Anesthesia Post Evaluation    Patient: Yenifer Chatterjee    Procedure(s) Performed: Procedure(s) (LRB):  CYSTOSCOPY, WITH URETERAL STENT INSERTION (Right)  PYELOGRAM, RETROGRADE (Right)    Final Anesthesia Type: general      Patient location during evaluation: PACU  Patient participation: Yes- Able to Participate  Level of consciousness: awake and alert and oriented  Post-procedure vital signs: reviewed and stable  Pain management: adequate  Airway patency: patent  LALITO mitigation strategies: Intraoperative administration of CPAP, nasopharyngeal airway, or oral appliance during sedation, Multimodal analgesia, Extubation while patient is awake, Verification of full reversal of neuromuscular block and Extubation and recovery carried out in lateral, semiupright, or other nonsupine position  PONV status at discharge: No PONV  Anesthetic complications: no      Cardiovascular status: hemodynamically stable  Respiratory status: unassisted  Hydration status: euvolemic  Follow-up not needed.          Vitals Value Taken Time   /53 09/06/22 0430   Temp 36.4 °C (97.5 °F) 09/06/22 0430   Pulse 107 09/06/22 0430   Resp 18 09/06/22 0430   SpO2 92 % 09/06/22 0430         Event Time   Out of Recovery 19:07:00         Pain/Ed Score: Pain Rating Prior to Med Admin: 3 (9/6/2022  2:56 AM)  Ed Score: 10 (9/5/2022  3:00 PM)

## 2022-09-06 NOTE — PLAN OF CARE
09/06/22 0937   Post-Acute Status   Post-Acute Authorization IV Infusion   IV Infusion Status Referral(s) sent     SW faxed referral to [facility] and [facility] via [facility] to follow for possible IV-ABX needs. SW will follow up as needed.    11:42 AM  [facility]- insurance denial. TATIANA sent referral to [facility].     Janki Nieto LMSW  Case Management   [facility]   Ext. [phone]

## 2022-09-06 NOTE — HPI
Ms. Chatterjee is a 56 y.o F w/ no known PMH who was initially admitted to [facility] [place] for evaluation of fatigue, abdominal symptoms with subjective fevers. Imaging showed concerns for  9mm right proximal ureteral stone with mild proximal hydronephrosis and Urology placed right ureteral stent on 09/04 with improvement in symptoms. Blood cultures grew gram negative rods and patient was started on Ceftriaxone with plans for stone treatment after full two week course of culture appropriate antibiotics. Infectious Diseases consulted for GNR bacteremia.

## 2022-09-06 NOTE — PLAN OF CARE
[facility]  Initial Discharge Assessment       Primary Care Provider: Primary Doctor No    Admission Diagnosis: Ureteral stone [N20.1]  SOB (shortness of breath) [R06.02]  Right ureteral stone [N20.1]    Admission Date: 9/4/2022  Expected Discharge Date: 9/7/2022         Payor: AETNA / Plan: AET[employer] CHOICE POS / Product Type: Commercial /     Extended Emergency Contact Information  Primary Emergency Contact: Remy Hutrado  Address: [address] [place] [address]  Home Phone: [phone]  Mobile Phone: [phone]  Relation: Significant other    Discharge Plan A: Home with family  Discharge Plan B: Home with family    No Pharmacies Listed    Initial Assessment (most recent)       Adult Discharge Assessment - 09/06/22 0930          Discharge Assessment    Assessment Type Discharge Planning Assessment     Confirmed/corrected address, phone number and insurance Yes     Confirmed Demographics Correct on Facesheet     Source of Information patient     Does patient/caregiver understand observation status Yes     Communicated ROCIO with patient/caregiver Yes     Lives With significant other     Do you expect to return to your current living situation? Yes     Do you have help at home or someone to help you manage your care at home? Yes     Who are your caregiver(s) and their phone number(s)? Remy Hurtado (Significant other) [phone]     Prior to hospitilization cognitive status: Alert/Oriented     Current cognitive status: Alert/Oriented     Walking or Climbing Stairs Difficulty none     Dressing/Bathing Difficulty none     Equipment Currently Used at Home none     Readmission within 30 days? No     Patient currently being followed by outpatient case management? No     Do you currently have service(s) that help you manage your care at home? No     Do you take prescription medications? Yes     Do you have prescription coverage? Yes     Do you have any problems affording  any of your prescribed medications? No     Is the patient taking medications as prescribed? yes     Who is going to help you get home at discharge? Remy (Significant other)     How do you get to doctors appointments? family or friend will provide     Are you on dialysis? No     Do you take coumadin? No     Discharge Plan A Home with family     Discharge Plan B Home with family                      Spoke with patient at bedside to complete discharge planning assessment. Patient lives with significant other in a two-story townhouse with one step to enter. Master is located on the second level with 12 steps between floors. Patient is Independent and has no DME. Verified PCP, Pharmacy and health insurance. Patient reports her PCP retired and would like to establish care with a provider at [facility]. Will schedule a follow-up at discharge. ID following for IVAB. SW to send referral to [facility] and Home infusion in case Home IVAB are needed. Will continue to follow.

## 2022-09-06 NOTE — SUBJECTIVE & OBJECTIVE
Interval History: AFVSS. NAEON. Complains of frequent urination. States she thinks she has bronchitis.     Objective:     Temp:  [97.3 °F (36.3 °C)-99.1 °F (37.3 °C)] 97.5 °F (36.4 °C)  Pulse:  [] 107  Resp:  [18] 18  SpO2:  [92 %-96 %] 92 %  BP: (104-123)/(53-73) 105/53     Body mass index is 39.41 kg/m².      Bladder Scan Volume (mL): 22 mL (09/05/22 1300)    Drains       None                   Physical Exam  Constitutional:       Appearance: Normal appearance. She is obese. She is not ill-appearing or diaphoretic.   HENT:      Head: Normocephalic and atraumatic.   Eyes:      General: No scleral icterus.  Pulmonary:      Effort: Pulmonary effort is normal. No respiratory distress.      Breath sounds: No wheezing.   Abdominal:      General: Abdomen is flat. There is no distension.   Skin:     General: Skin is warm and dry.   Neurological:      General: No focal deficit present.      Mental Status: She is alert and oriented to person, place, and time.       Significant Labs:    BMP:  Recent Labs   Lab 09/04/22  1246 09/05/22  0320 09/06/22 0226   * 143 135*   K 3.1* 4.4 4.1    108 110   CO2 18* 25 23   BUN 18 17 18   CREATININE 1.4 1.1 0.8   CALCIUM 8.7 8.4* 8.6*       CBC:   Recent Labs   Lab 09/04/22  1246 09/05/22  0320 09/06/22  0226   WBC 14.65* 10.66 10.47   HGB 12.1 10.5* 11.3*   HCT 35.5* 32.3* 33.9*   * 84* 88*       All pertinent labs results from the past 24 hours have been reviewed.    Significant Imaging:  All pertinent imaging results/findings from the past 24 hours have been reviewed.

## 2022-09-07 VITALS
SYSTOLIC BLOOD PRESSURE: 132 MMHG | HEART RATE: 108 BPM | RESPIRATION RATE: 22 BRPM | BODY MASS INDEX: 39.41 KG/M2 | WEIGHT: 229.63 LBS | OXYGEN SATURATION: 92 % | DIASTOLIC BLOOD PRESSURE: 71 MMHG | TEMPERATURE: 100 F

## 2022-09-07 LAB
ANION GAP SERPL CALC-SCNC: 9 MMOL/L (ref 8–16)
ANISOCYTOSIS BLD QL SMEAR: SLIGHT
BACTERIA BLD CULT: ABNORMAL
BASOPHILS # BLD AUTO: 0.03 K/UL (ref 0–0.2)
BASOPHILS NFR BLD: 0.3 % (ref 0–1.9)
BUN SERPL-MCNC: 13 MG/DL (ref 6–20)
CALCIUM SERPL-MCNC: 8.7 MG/DL (ref 8.7–10.5)
CHLORIDE SERPL-SCNC: 108 MMOL/L (ref 95–110)
CO2 SERPL-SCNC: 22 MMOL/L (ref 23–29)
CREAT SERPL-MCNC: 0.7 MG/DL (ref 0.5–1.4)
DIFFERENTIAL METHOD: ABNORMAL
EOSINOPHIL # BLD AUTO: 0 K/UL (ref 0–0.5)
EOSINOPHIL NFR BLD: 0.4 % (ref 0–8)
ERYTHROCYTE [DISTWIDTH] IN BLOOD BY AUTOMATED COUNT: 13.5 % (ref 11.5–14.5)
EST. GFR  (NO RACE VARIABLE): >60 ML/MIN/1.73 M^2
GIANT PLATELETS BLD QL SMEAR: PRESENT
GLUCOSE SERPL-MCNC: 119 MG/DL (ref 70–110)
HCT VFR BLD AUTO: 30.8 % (ref 37–48.5)
HGB BLD-MCNC: 10.4 G/DL (ref 12–16)
IMM GRANULOCYTES # BLD AUTO: 0.15 K/UL (ref 0–0.04)
IMM GRANULOCYTES NFR BLD AUTO: 1.5 % (ref 0–0.5)
LYMPHOCYTES # BLD AUTO: 0.5 K/UL (ref 1–4.8)
LYMPHOCYTES NFR BLD: 4.9 % (ref 18–48)
MCH RBC QN AUTO: 31.2 PG (ref 27–31)
MCHC RBC AUTO-ENTMCNC: 33.8 G/DL (ref 32–36)
MCV RBC AUTO: 93 FL (ref 82–98)
MONOCYTES # BLD AUTO: 0.9 K/UL (ref 0.3–1)
MONOCYTES NFR BLD: 8.9 % (ref 4–15)
NEUTROPHILS # BLD AUTO: 8.3 K/UL (ref 1.8–7.7)
NEUTROPHILS NFR BLD: 84 % (ref 38–73)
NRBC BLD-RTO: 0 /100 WBC
PLATELET # BLD AUTO: 90 K/UL (ref 150–450)
PLATELET BLD QL SMEAR: ABNORMAL
PMV BLD AUTO: 11.8 FL (ref 9.2–12.9)
POTASSIUM SERPL-SCNC: 4 MMOL/L (ref 3.5–5.1)
RBC # BLD AUTO: 3.33 M/UL (ref 4–5.4)
SODIUM SERPL-SCNC: 139 MMOL/L (ref 136–145)
WBC # BLD AUTO: 9.92 K/UL (ref 3.9–12.7)

## 2022-09-07 PROCEDURE — 85025 COMPLETE CBC W/AUTO DIFF WBC: CPT | Performed by: STUDENT IN AN ORGANIZED HEALTH CARE EDUCATION/TRAINING PROGRAM

## 2022-09-07 PROCEDURE — 36415 COLL VENOUS BLD VENIPUNCTURE: CPT | Performed by: STUDENT IN AN ORGANIZED HEALTH CARE EDUCATION/TRAINING PROGRAM

## 2022-09-07 PROCEDURE — 25000003 PHARM REV CODE 250: Performed by: STUDENT IN AN ORGANIZED HEALTH CARE EDUCATION/TRAINING PROGRAM

## 2022-09-07 PROCEDURE — 80048 BASIC METABOLIC PNL TOTAL CA: CPT | Performed by: STUDENT IN AN ORGANIZED HEALTH CARE EDUCATION/TRAINING PROGRAM

## 2022-09-07 PROCEDURE — 25000003 PHARM REV CODE 250

## 2022-09-07 RX ORDER — CIPROFLOXACIN 500 MG/1
750 TABLET ORAL EVERY 12 HOURS
Qty: 42 TABLET | Refills: 0 | Status: ON HOLD | OUTPATIENT
Start: 2022-09-07 | End: 2022-09-15 | Stop reason: SDUPTHER

## 2022-09-07 RX ORDER — TAMSULOSIN HYDROCHLORIDE 0.4 MG/1
0.4 CAPSULE ORAL DAILY
Qty: 14 CAPSULE | Refills: 0 | Status: SHIPPED | OUTPATIENT
Start: 2022-09-07 | End: 2022-09-21

## 2022-09-07 RX ORDER — OXYBUTYNIN CHLORIDE 5 MG/1
5 TABLET ORAL 3 TIMES DAILY
Qty: 90 TABLET | Refills: 0 | Status: ON HOLD | OUTPATIENT
Start: 2022-09-07 | End: 2024-03-19 | Stop reason: HOSPADM

## 2022-09-07 RX ORDER — CIPROFLOXACIN 750 MG/1
750 TABLET, FILM COATED ORAL EVERY 12 HOURS
Status: DISCONTINUED | OUTPATIENT
Start: 2022-09-07 | End: 2022-09-07 | Stop reason: HOSPADM

## 2022-09-07 RX ADMIN — OXYCODONE HYDROCHLORIDE 10 MG: 10 TABLET ORAL at 02:09

## 2022-09-07 RX ADMIN — MUPIROCIN: 20 OINTMENT TOPICAL at 08:09

## 2022-09-07 RX ADMIN — CIPROFLOXACIN 750 MG: 750 TABLET, FILM COATED ORAL at 08:09

## 2022-09-07 RX ADMIN — ACETAMINOPHEN 650 MG: 325 TABLET ORAL at 01:09

## 2022-09-07 NOTE — PROGRESS NOTES
Discharge NotePt discharged home alert and oriented x4, skin warmto touch, pt voiding without difficuty.Pt verbalized understanding of discharge teaching.

## 2022-09-07 NOTE — PROGRESS NOTES
[facility]  Urology  Progress Note    Patient Name: Yenifer Chatterjee  MRN: [id number]  Admission Date: 9/4/2022  Hospital Length of Stay: 3 days  Code Status: Full Code   Attending Provider: Ree Pak MD   Primary Care Physician: Primary Doctor No    Subjective:     HPI:  Patient is a 57 yo F with no known PMH who presents to [facility] ED due to  a few days of SOB, nausea, vomiting, generalized weakness, headache, abdominal pain. Patient states that she went to [place] ED yesterday for evaluation, got 1 L of fluids and IV Zofran and felt significant symptomatic improvement and went home. Her abdominal pain has resolved since visiting the [place] ED. She states that she has had nausea and vomiting since 3 days ago. She also reports subjective fevers. She denies gross hematuria, dysuria, difficulty voiding.     Urology consulted for CTA C/A/P which shows a 9mm right proximal ureteral stone with mild proximal hydronephrosis. There is no left sided hydro or left ureteral stones. Bladder unremarkable. Report states there is no PE. WBC 14.65. Cr 1.4 (unknown baseline). Lactate 2.17. Clean catch UA nitrite negative, 24 RBC, > 100 WBC, many bacteria, 2 SEC. She last ate on Friday. She last drank at 8am.       Interval History: Tmax 100.1. Tachy 106-119 overnight. Ucx resulting Klebsiella.       Objective:     Temp:  [97.6 °F (36.4 °C)-100.1 °F (37.8 °C)] 99.9 °F (37.7 °C)  Pulse:  [] 107  Resp:  [17-18] 18  SpO2:  [92 %-96 %] 92 %  BP: (109-142)/(57-74) 126/71     Body mass index is 39.41 kg/m².      Bladder Scan Volume (mL): 22 mL (09/05/22 1300)    Drains       None                   Physical Exam  Vitals reviewed.   Constitutional:       General: She is not in acute distress.     Appearance: Normal appearance. She is well-developed. She is obese. She is not ill-appearing or diaphoretic.   HENT:      Head: Normocephalic and atraumatic.   Eyes:      General: No scleral icterus.  Cardiovascular:      Rate and  Rhythm: Normal rate.   Pulmonary:      Effort: Pulmonary effort is normal. No respiratory distress.      Breath sounds: No stridor. No wheezing.   Abdominal:      General: Abdomen is flat. There is no distension.      Palpations: Abdomen is soft.      Tenderness: There is no abdominal tenderness.   Musculoskeletal:         General: Normal range of motion.      Cervical back: Normal range of motion.   Skin:     General: Skin is warm and dry.   Neurological:      General: No focal deficit present.      Mental Status: She is alert.   Psychiatric:         Behavior: Behavior normal.       Significant Labs:    BMP:  Recent Labs   Lab 09/04/22  1246 09/05/22 0320 09/06/22 0226   * 143 135*   K 3.1* 4.4 4.1    108 110   CO2 18* 25 23   BUN 18 17 18   CREATININE 1.4 1.1 0.8   CALCIUM 8.7 8.4* 8.6*       CBC:   Recent Labs   Lab 09/04/22  1246 09/05/22 0320 09/06/22 0226   WBC 14.65* 10.66 10.47   HGB 12.1 10.5* 11.3*   HCT 35.5* 32.3* 33.9*   * 84* 88*       All pertinent labs results from the past 24 hours have been reviewed.    Significant Imaging:  All pertinent imaging results/findings from the past 24 hours have been reviewed.                    Assessment/Plan:     * Right ureteral stone  57 yo F with right ureteral stone and UTI. S/p right ureteral stent placement 9/4. Clinically improving    - regular diet    -f/u labs  -UCx resulting Klebsiella. Bcx growing GNR. Susceptibility pending   - ID consult for GNR bacteremia. Recommend dc rocephin and start cipro 750mg BID for two weeks.   - ambulation, IS, SCDs   - will need definitive stone treatment after full two week course of culture appropriate antibiotics  -Plan for discharge today        VTE Risk Mitigation (From admission, onward)         Ordered     Place SALEEM hose  Until discontinued         09/04/22 1905     IP VTE HIGH RISK PATIENT  Once         09/04/22 1905     Place sequential compression device  Until discontinued         09/04/22  1905                KAREN TERAN MD  Urology  [facility]

## 2022-09-07 NOTE — DISCHARGE SUMMARY
[facility]  Urology  Discharge Summary      Patient Name: Yenifer Chatterjee  MRN: [id number]  Admission Date: 9/4/2022  Hospital Length of Stay: 3 days  Discharge Date and Time:  09/07/2022 10:50 AM  Attending Physician: Ree Pak MD   Discharging Provider: KAREN TERAN MD  Primary Care Physician: Primary Doctor No    HPI:   Patient is a 55 yo F with no known PMH who presents to [facility] ED due to  a few days of SOB, nausea, vomiting, generalized weakness, headache, abdominal pain. Patient states that she went to [place] ED yesterday for evaluation, got 1 L of fluids and IV Zofran and felt significant symptomatic improvement and went home. Her abdominal pain has resolved since visiting the [place] ED. She states that she has had nausea and vomiting since 3 days ago. She also reports subjective fevers. She denies gross hematuria, dysuria, difficulty voiding.     Urology consulted for CTA C/A/P which shows a 9mm right proximal ureteral stone with mild proximal hydronephrosis. There is no left sided hydro or left ureteral stones. Bladder unremarkable. Report states there is no PE. WBC 14.65. Cr 1.4 (unknown baseline). Lactate 2.17. Clean catch UA nitrite negative, 24 RBC, > 100 WBC, many bacteria, 2 SEC. She last ate on Friday. She last drank at 8am.       Procedure(s) (LRB):  CYSTOSCOPY, WITH URETERAL STENT INSERTION (Right)  PYELOGRAM, RETROGRADE (Right)     Indwelling Lines/Drains at time of discharge:   Lines/Drains/Airways       None                   Hospital Course (synopsis of major diagnoses, care, treatment, and services provided during the course of the hospital stay): Patient presented to [facility] and underwent above procedure. Tolerated the procedure well and was transferred to the floor after recovery from anesthesia. Please see the operative note for further procedure details. Vitals remained stable throughout the post operative period. Physical exam was appropriate for post operative state.  Diet was  advanced, and the patient was able to tolerate a regular diet prior to discharge. Patient was ambulating on POD1 without issues. Patient was deemed suitable for discharge on No discharge date for patient encounter.       Goals of Care Treatment Preferences:  Code Status: Full Code      Consults:   Consults (From admission, onward)          Status Ordering Provider     Inpatient consult to Infectious Diseases  Once        Provider:  (Not yet assigned)    Completed VANCE GONZALES     Inpatient consult to Urology  Once        Provider:  (Not yet assigned)    Completed EMELYN FUENTES            Significant Diagnostic Studies:     Pending Diagnostic Studies:       None            Final Active Diagnoses:    Diagnosis Date Noted POA    PRINCIPAL PROBLEM:  Right ureteral stone [N20.1] 09/04/2022 Unknown      Problems Resolved During this Admission:         Discharged Condition: good    Disposition: Home or Self Care    Follow Up:    Patient Instructions:      Diet Adult Regular     Lifting restrictions     Notify your health care provider if you experience any of the following:  temperature >100.4     Notify your health care provider if you experience any of the following:  persistent nausea and vomiting or diarrhea     Notify your health care provider if you experience any of the following:  severe uncontrolled pain     Notify your health care provider if you experience any of the following:  redness, tenderness, or signs of infection (pain, swelling, redness, odor or green/yellow discharge around incision site)     Notify your health care provider if you experience any of the following:  difficulty breathing or increased cough     Notify your health care provider if you experience any of the following:  severe persistent headache     Notify your health care provider if you experience any of the following:  worsening rash     Notify your health care provider if you experience any of the following:  persistent  dizziness, light-headedness, or visual disturbances     Notify your health care provider if you experience any of the following:  increased confusion or weakness     Notify your health care provider if you experience any of the following:     Activity as tolerated     Medications:  Reconciled Home Medications:      Medication List        START taking these medications      ciprofloxacin HCl 500 MG tablet  Commonly known as: CIPRO  Take 1.5 tablets (750 mg total) by mouth every 12 (twelve) hours. for 14 days     oxybutynin 5 MG Tab  Commonly known as: DITROPAN  Take 1 tablet (5 mg total) by mouth 3 (three) times daily.     tamsulosin 0.4 mg Cap  Commonly known as: FLOMAX  Take 1 capsule (0.4 mg total) by mouth once daily. for 14 days            CONTINUE taking these medications      ondansetron 4 MG Tbdl  Commonly known as: ZOFRAN-ODT  Take 1 tablet (4 mg total) by mouth every 6 (six) hours as needed (nausea vomiting).              Time spent on the discharge of patient: 15 minutes    KAREN TERAN MD  Urology  [facility] - [facility]    As above.

## 2022-09-07 NOTE — DISCHARGE INSTRUCTIONS
Post Cystoscopy Instructions  Do not strain to have a bowel movement  No strenuous exercise x 7 days  No driving while you are on narcotic pain medications or if your nunez  catheter is in place    You can expect:  To pass stone fragments if you had a stone procedure  Have pain when you void from your stent if you have a stent in place  See blood in your urine if you have a stent in place    If you have a catheter, please return to the ER if your catheter stops draining or you are having abdominal pain.    Call the doctor if:  Temperature is greater than 101F  Persistent vomiting and inability to keep food down  Inability to void if you do not have a catheter

## 2022-09-07 NOTE — SUBJECTIVE & OBJECTIVE
Interval History: Tmax 100.1. Tachy 106-119 overnight. Ucx resulting Klebsiella.       Objective:     Temp:  [97.6 °F (36.4 °C)-100.1 °F (37.8 °C)] 99.9 °F (37.7 °C)  Pulse:  [] 107  Resp:  [17-18] 18  SpO2:  [92 %-96 %] 92 %  BP: (109-142)/(57-74) 126/71     Body mass index is 39.41 kg/m².      Bladder Scan Volume (mL): 22 mL (09/05/22 1300)    Drains       None                   Physical Exam  Vitals reviewed.   Constitutional:       General: She is not in acute distress.     Appearance: Normal appearance. She is well-developed. She is obese. She is not ill-appearing or diaphoretic.   HENT:      Head: Normocephalic and atraumatic.   Eyes:      General: No scleral icterus.  Cardiovascular:      Rate and Rhythm: Normal rate.   Pulmonary:      Effort: Pulmonary effort is normal. No respiratory distress.      Breath sounds: No stridor. No wheezing.   Abdominal:      General: Abdomen is flat. There is no distension.      Palpations: Abdomen is soft.      Tenderness: There is no abdominal tenderness.   Musculoskeletal:         General: Normal range of motion.      Cervical back: Normal range of motion.   Skin:     General: Skin is warm and dry.   Neurological:      General: No focal deficit present.      Mental Status: She is alert.   Psychiatric:         Behavior: Behavior normal.       Significant Labs:    BMP:  Recent Labs   Lab 09/04/22  1246 09/05/22  0320 09/06/22 0226   * 143 135*   K 3.1* 4.4 4.1    108 110   CO2 18* 25 23   BUN 18 17 18   CREATININE 1.4 1.1 0.8   CALCIUM 8.7 8.4* 8.6*       CBC:   Recent Labs   Lab 09/04/22  1246 09/05/22  0320 09/06/22 0226   WBC 14.65* 10.66 10.47   HGB 12.1 10.5* 11.3*   HCT 35.5* 32.3* 33.9*   * 84* 88*       All pertinent labs results from the past 24 hours have been reviewed.    Significant Imaging:  All pertinent imaging results/findings from the past 24 hours have been reviewed.

## 2022-09-07 NOTE — PLAN OF CARE
Pt AAOx4. Vital signs as charted. Safety measures in place. PRN medication administered for c/o headache and pain. Voiding without difficulty. PIV intact. Pt resting throughout night. No falls or injuries at this time.

## 2022-09-07 NOTE — ASSESSMENT & PLAN NOTE
55 yo F with right ureteral stone and UTI. S/p right ureteral stent placement 9/4. Clinically improving    - regular diet    -f/u labs  -UCx resulting Klebsiella. Bcx growing GNR. Susceptibility pending   - ID consult for GNR bacteremia. Recommend dc rocephin and start cipro 750mg BID for two weeks.   - ambulation, IS, SCDs   - will need definitive stone treatment after full two week course of culture appropriate antibiotics  -Plan for discharge today

## 2022-09-07 NOTE — PROGRESS NOTES
Pt states that her  probably will not come to pick her up because he thinks she is not strong enough to go home,Pt stes she informed the Doctors and they told her that she is safe enough o go home.

## 2022-09-08 ENCOUNTER — PATIENT OUTREACH (OUTPATIENT)
Dept: ADMINISTRATIVE | Facility: CLINIC | Age: 57
End: 2022-09-08
Payer: COMMERCIAL

## 2022-09-08 DIAGNOSIS — N20.1 RIGHT URETERAL STONE: Primary | ICD-10-CM

## 2022-09-08 NOTE — PLAN OF CARE
[facility]  Discharge Final Note    Primary Care Provider: Primary Doctor No    Expected Discharge Date: 9/7/2022    Final Discharge Note (most recent)       Final Note - 09/08/22 1536          Final Note    Assessment Type Final Discharge Note     Anticipated Discharge Disposition Home or Self Care     What phone number can be called within the next 1-3 days to see how you are doing after discharge? --   [phone]    Hospital Resources/Appts/Education Provided Appointments scheduled and added to AVS                     Important Message from Medicare      Patient discharged home to care of spouse on 9/7/22.

## 2022-09-08 NOTE — PROGRESS NOTES
C3 nurse spoke with Yenifer Chatterjee for a TCC post hospital discharge follow up call. The patient reports does not have a scheduled HOSFU appointment. C3 nurse was unable to schedule HOSFU appointment for [facility]. NP HV referral placed. OCM referral placed for asst w/finding [facility].

## 2022-09-09 ENCOUNTER — TELEPHONE (OUTPATIENT)
Dept: UROLOGY | Facility: CLINIC | Age: 57
End: 2022-09-09
Payer: COMMERCIAL

## 2022-09-09 ENCOUNTER — NURSE TRIAGE (OUTPATIENT)
Dept: ADMINISTRATIVE | Facility: CLINIC | Age: 57
End: 2022-09-09
Payer: COMMERCIAL

## 2022-09-09 NOTE — TELEPHONE ENCOUNTER
Reason for Disposition   Unable to urinate (or only a few drops) > 4 hours and bladder feels very full (e.g., palpable bladder or strong urge to urinate)    Additional Information   Negative: Passed out (i.e., lost consciousness, collapsed and was not responding)   Negative: Shock suspected (e.g., cold/pale/clammy skin, too weak to stand, low BP, rapid pulse)   Negative: Sounds like a life-threatening emergency to the triager   Negative: Followed a major injury to the back (e.g., MVA, fall > 10 feet or 3 meters, penetrating injury, etc.)   Negative: Upper, mid or lower back pain that occurs mainly in the midline   Negative: SEVERE pain (e.g., excruciating, scale 8-10) and present > 1 hour   Negative: Sudden onset of severe flank pain and age > 60 years   Negative: Abdominal pain and age > 60 years    Protocols used: Flank Pain-A-[place]  admit Sunday 9/4. stent placed for kidney stone. dc'd on 9/7. weak and cant hold urine. not getting better. blood in urine. T99. no N/V. pain comes and goes. laying in bed x 4 days. having lower back pain rates 7. last dose of Tylenol at 0900, last BM since Monday 9/5. +flatus. eating reg diet. rec ED. caller requests to speak with dr Pak 's office. urgent office message sent. call back with questions

## 2022-09-10 ENCOUNTER — TELEPHONE (OUTPATIENT)
Dept: UROLOGY | Facility: HOSPITAL | Age: 57
End: 2022-09-10
Payer: COMMERCIAL

## 2022-09-10 ENCOUNTER — HOSPITAL ENCOUNTER (INPATIENT)
Facility: HOSPITAL | Age: 57
LOS: 4 days | Discharge: HOME | DRG: 872 | End: 2022-09-15
Attending: EMERGENCY MEDICINE | Admitting: INTERNAL MEDICINE
Payer: COMMERCIAL

## 2022-09-10 ENCOUNTER — NURSE TRIAGE (OUTPATIENT)
Dept: ADMINISTRATIVE | Facility: CLINIC | Age: 57
End: 2022-09-10
Payer: COMMERCIAL

## 2022-09-10 DIAGNOSIS — R78.81 POSITIVE BLOOD CULTURES: ICD-10-CM

## 2022-09-10 DIAGNOSIS — R53.1 WEAKNESS: ICD-10-CM

## 2022-09-10 DIAGNOSIS — N20.1 RIGHT URETERAL CALCULUS: ICD-10-CM

## 2022-09-10 DIAGNOSIS — N20.0 NEPHROLITH: ICD-10-CM

## 2022-09-10 DIAGNOSIS — N12 PYELONEPHRITIS: Primary | ICD-10-CM

## 2022-09-10 DIAGNOSIS — N39.0 UTI (URINARY TRACT INFECTION): ICD-10-CM

## 2022-09-10 DIAGNOSIS — N39.0 URINARY TRACT INFECTION WITHOUT HEMATURIA, SITE UNSPECIFIED: ICD-10-CM

## 2022-09-10 DIAGNOSIS — R07.9 CHEST PAIN: ICD-10-CM

## 2022-09-10 LAB
ALBUMIN SERPL BCP-MCNC: 2.3 G/DL (ref 3.5–5.2)
ALP SERPL-CCNC: 111 U/L (ref 55–135)
ALT SERPL W/O P-5'-P-CCNC: 47 U/L (ref 10–44)
ANION GAP SERPL CALC-SCNC: 9 MMOL/L (ref 8–16)
ANISOCYTOSIS BLD QL SMEAR: SLIGHT
AST SERPL-CCNC: 39 U/L (ref 10–40)
BACTERIA #/AREA URNS AUTO: ABNORMAL /HPF
BACTERIA BLD CULT: NORMAL
BASOPHILS NFR BLD: 0 % (ref 0–1.9)
BILIRUB SERPL-MCNC: 0.4 MG/DL (ref 0.1–1)
BILIRUB UR QL STRIP: NEGATIVE
BUN SERPL-MCNC: 20 MG/DL (ref 6–20)
CALCIUM SERPL-MCNC: 8.8 MG/DL (ref 8.7–10.5)
CHLORIDE SERPL-SCNC: 106 MMOL/L (ref 95–110)
CLARITY UR REFRACT.AUTO: ABNORMAL
CO2 SERPL-SCNC: 20 MMOL/L (ref 23–29)
COLOR UR AUTO: YELLOW
CREAT SERPL-MCNC: 0.8 MG/DL (ref 0.5–1.4)
DIFFERENTIAL METHOD: ABNORMAL
EOSINOPHIL NFR BLD: 3 % (ref 0–8)
ERYTHROCYTE [DISTWIDTH] IN BLOOD BY AUTOMATED COUNT: 14.2 % (ref 11.5–14.5)
EST. GFR  (NO RACE VARIABLE): >60 ML/MIN/1.73 M^2
GLUCOSE SERPL-MCNC: 107 MG/DL (ref 70–110)
GLUCOSE UR QL STRIP: NEGATIVE
HCT VFR BLD AUTO: 31.6 % (ref 37–48.5)
HGB BLD-MCNC: 11.1 G/DL (ref 12–16)
HGB UR QL STRIP: ABNORMAL
HYALINE CASTS UR QL AUTO: 0 /LPF
IMM GRANULOCYTES # BLD AUTO: ABNORMAL K/UL (ref 0–0.04)
IMM GRANULOCYTES NFR BLD AUTO: ABNORMAL % (ref 0–0.5)
KETONES UR QL STRIP: NEGATIVE
LACTATE SERPL-SCNC: 1.6 MMOL/L (ref 0.5–2.2)
LEUKOCYTE ESTERASE UR QL STRIP: ABNORMAL
LYMPHOCYTES NFR BLD: 14 % (ref 18–48)
MCH RBC QN AUTO: 33.1 PG (ref 27–31)
MCHC RBC AUTO-ENTMCNC: 35.1 G/DL (ref 32–36)
MCV RBC AUTO: 94 FL (ref 82–98)
METAMYELOCYTES NFR BLD MANUAL: 1 %
MICROSCOPIC COMMENT: ABNORMAL
MONOCYTES NFR BLD: 5 % (ref 4–15)
MYELOCYTES NFR BLD MANUAL: 3 %
NEUTROPHILS NFR BLD: 73 % (ref 38–73)
NEUTS BAND NFR BLD MANUAL: 1 %
NITRITE UR QL STRIP: NEGATIVE
NRBC BLD-RTO: 0 /100 WBC
OVALOCYTES BLD QL SMEAR: ABNORMAL
PH UR STRIP: 6 [PH] (ref 5–8)
PLATELET # BLD AUTO: 207 K/UL (ref 150–450)
PLATELET BLD QL SMEAR: ABNORMAL
PMV BLD AUTO: 10.3 FL (ref 9.2–12.9)
POIKILOCYTOSIS BLD QL SMEAR: SLIGHT
POLYCHROMASIA BLD QL SMEAR: ABNORMAL
POTASSIUM SERPL-SCNC: 4.1 MMOL/L (ref 3.5–5.1)
PROT SERPL-MCNC: 6.4 G/DL (ref 6–8.4)
PROT UR QL STRIP: ABNORMAL
RBC # BLD AUTO: 3.35 M/UL (ref 4–5.4)
RBC #/AREA URNS AUTO: >100 /HPF (ref 0–4)
SARS-COV-2 RDRP RESP QL NAA+PROBE: NEGATIVE
SODIUM SERPL-SCNC: 135 MMOL/L (ref 136–145)
SP GR UR STRIP: 1.02 (ref 1–1.03)
SQUAMOUS #/AREA URNS AUTO: 2 /HPF
URN SPEC COLLECT METH UR: ABNORMAL
WBC # BLD AUTO: 15.1 K/UL (ref 3.9–12.7)
WBC #/AREA URNS AUTO: 61 /HPF (ref 0–5)

## 2022-09-10 PROCEDURE — 87040 BLOOD CULTURE FOR BACTERIA: CPT | Mod: 59

## 2022-09-10 PROCEDURE — 93005 ELECTROCARDIOGRAM TRACING: CPT

## 2022-09-10 PROCEDURE — 84466 ASSAY OF TRANSFERRIN: CPT

## 2022-09-10 PROCEDURE — 83605 ASSAY OF LACTIC ACID: CPT

## 2022-09-10 PROCEDURE — 93010 EKG 12-LEAD: ICD-10-PCS | Mod: ,,, | Performed by: INTERNAL MEDICINE

## 2022-09-10 PROCEDURE — 99285 EMERGENCY DEPT VISIT HI MDM: CPT | Mod: 25

## 2022-09-10 PROCEDURE — 80053 COMPREHEN METABOLIC PANEL: CPT

## 2022-09-10 PROCEDURE — 93010 ELECTROCARDIOGRAM REPORT: CPT | Mod: ,,, | Performed by: INTERNAL MEDICINE

## 2022-09-10 PROCEDURE — 99284 PR EMERGENCY DEPT VISIT,LEVEL IV: ICD-10-PCS | Mod: CS,,, | Performed by: EMERGENCY MEDICINE

## 2022-09-10 PROCEDURE — U0002 COVID-19 LAB TEST NON-CDC: HCPCS

## 2022-09-10 PROCEDURE — 96361 HYDRATE IV INFUSION ADD-ON: CPT

## 2022-09-10 PROCEDURE — 87086 URINE CULTURE/COLONY COUNT: CPT

## 2022-09-10 PROCEDURE — 25000003 PHARM REV CODE 250

## 2022-09-10 PROCEDURE — 81001 URINALYSIS AUTO W/SCOPE: CPT

## 2022-09-10 PROCEDURE — 85027 COMPLETE CBC AUTOMATED: CPT

## 2022-09-10 PROCEDURE — 85007 BL SMEAR W/DIFF WBC COUNT: CPT

## 2022-09-10 PROCEDURE — 82728 ASSAY OF FERRITIN: CPT

## 2022-09-10 PROCEDURE — 99284 EMERGENCY DEPT VISIT MOD MDM: CPT | Mod: CS,,, | Performed by: EMERGENCY MEDICINE

## 2022-09-10 RX ADMIN — SODIUM CHLORIDE 1000 ML: 0.9 INJECTION, SOLUTION INTRAVENOUS at 10:09

## 2022-09-10 NOTE — TELEPHONE ENCOUNTER
Pt calling with SO, states that she had a procedure on 9/4/22 and had a BM on 9/5/22 but hasnt had one since. Reports she feels like she needs to have one but is unable to. Denies abd swelling, vomiting, constant or intermittent abd pain. Per protocol advised to be seen by HCP within 24 hours and care advice. verbalized understanding. Denies any further questions or concerns at this time, advised to call back if they have any that come up. Advised pt to call back with any other concerns or worsening symptoms. Verbalized understanding and will route message to provider.         Reason for Disposition   Last bowel movement (BM) > 4 days ago    Additional Information   Negative: Patient sounds very sick or weak to the triager   Negative: [1] Vomiting AND [2] abdomen looks much more swollen than usual   Negative: [1] Vomiting AND [2] contains bile (green color)   Negative: [1] Constant abdominal pain AND [2] present > 2 hours   Negative: [1] Sudden onset rectal pain (straining, rectal pressure or fullness) AND [2] NOT better after SITZ bath, suppository or enema   Negative: [1] Intermittent mild abdominal pain AND [2] fever   Negative: Abdomen is more swollen than usual    Protocols used: Constipation-A-AH

## 2022-09-11 PROBLEM — N30.90 CYSTITIS: Status: ACTIVE | Noted: 2022-09-11

## 2022-09-11 PROBLEM — E88.09 HYPOALBUMINEMIA: Status: ACTIVE | Noted: 2022-09-11

## 2022-09-11 PROBLEM — N12 PYELONEPHRITIS: Status: ACTIVE | Noted: 2022-09-11

## 2022-09-11 PROBLEM — R78.81 POSITIVE BLOOD CULTURE: Status: ACTIVE | Noted: 2022-09-11

## 2022-09-11 PROBLEM — D64.89 OTHER SPECIFIED ANEMIAS: Status: ACTIVE | Noted: 2022-09-11

## 2022-09-11 LAB
FERRITIN SERPL-MCNC: 1511 NG/ML (ref 20–300)
FOLATE SERPL-MCNC: 6.2 NG/ML (ref 4–24)
IRON SERPL-MCNC: 29 UG/DL (ref 30–160)
LACTATE SERPL-SCNC: 1.1 MMOL/L (ref 0.5–2.2)
SATURATED IRON: 12 % (ref 20–50)
TOTAL IRON BINDING CAPACITY: 235 UG/DL (ref 250–450)
TRANSFERRIN SERPL-MCNC: 159 MG/DL (ref 200–375)
VIT B12 SERPL-MCNC: 857 PG/ML (ref 210–950)

## 2022-09-11 PROCEDURE — 99223 1ST HOSP IP/OBS HIGH 75: CPT | Mod: ,,, | Performed by: INTERNAL MEDICINE

## 2022-09-11 PROCEDURE — 87040 BLOOD CULTURE FOR BACTERIA: CPT

## 2022-09-11 PROCEDURE — 11000001 HC ACUTE MED/SURG PRIVATE ROOM

## 2022-09-11 PROCEDURE — 87086 URINE CULTURE/COLONY COUNT: CPT | Performed by: STUDENT IN AN ORGANIZED HEALTH CARE EDUCATION/TRAINING PROGRAM

## 2022-09-11 PROCEDURE — 82607 VITAMIN B-12: CPT | Performed by: INTERNAL MEDICINE

## 2022-09-11 PROCEDURE — 36415 COLL VENOUS BLD VENIPUNCTURE: CPT | Performed by: INTERNAL MEDICINE

## 2022-09-11 PROCEDURE — 99223 PR INITIAL HOSPITAL CARE,LEVL III: ICD-10-PCS | Mod: ,,, | Performed by: INTERNAL MEDICINE

## 2022-09-11 PROCEDURE — 63600175 PHARM REV CODE 636 W HCPCS

## 2022-09-11 PROCEDURE — 83605 ASSAY OF LACTIC ACID: CPT

## 2022-09-11 PROCEDURE — 82746 ASSAY OF FOLIC ACID SERUM: CPT | Performed by: INTERNAL MEDICINE

## 2022-09-11 PROCEDURE — 96365 THER/PROPH/DIAG IV INF INIT: CPT

## 2022-09-11 PROCEDURE — 25000003 PHARM REV CODE 250

## 2022-09-11 PROCEDURE — 25500020 PHARM REV CODE 255: Performed by: EMERGENCY MEDICINE

## 2022-09-11 RX ORDER — IBUPROFEN 200 MG
24 TABLET ORAL
Status: DISCONTINUED | OUTPATIENT
Start: 2022-09-11 | End: 2022-09-15 | Stop reason: HOSPADM

## 2022-09-11 RX ORDER — ACETAMINOPHEN 325 MG/1
650 TABLET ORAL EVERY 6 HOURS PRN
Status: DISCONTINUED | OUTPATIENT
Start: 2022-09-11 | End: 2022-09-15 | Stop reason: HOSPADM

## 2022-09-11 RX ORDER — IBUPROFEN 200 MG
16 TABLET ORAL
Status: DISCONTINUED | OUTPATIENT
Start: 2022-09-11 | End: 2022-09-15 | Stop reason: HOSPADM

## 2022-09-11 RX ORDER — GLUCAGON 1 MG
1 KIT INJECTION
Status: DISCONTINUED | OUTPATIENT
Start: 2022-09-11 | End: 2022-09-15 | Stop reason: HOSPADM

## 2022-09-11 RX ORDER — ENOXAPARIN SODIUM 100 MG/ML
40 INJECTION SUBCUTANEOUS EVERY 24 HOURS
Status: DISCONTINUED | OUTPATIENT
Start: 2022-09-11 | End: 2022-09-15 | Stop reason: HOSPADM

## 2022-09-11 RX ORDER — SODIUM CHLORIDE 0.9 % (FLUSH) 0.9 %
10 SYRINGE (ML) INJECTION EVERY 12 HOURS PRN
Status: DISCONTINUED | OUTPATIENT
Start: 2022-09-11 | End: 2022-09-15 | Stop reason: HOSPADM

## 2022-09-11 RX ORDER — POLYETHYLENE GLYCOL 3350 17 G/17G
17 POWDER, FOR SOLUTION ORAL 2 TIMES DAILY PRN
Status: DISCONTINUED | OUTPATIENT
Start: 2022-09-11 | End: 2022-09-15 | Stop reason: HOSPADM

## 2022-09-11 RX ORDER — POLYETHYLENE GLYCOL 3350 17 G/17G
17 POWDER, FOR SOLUTION ORAL 2 TIMES DAILY
Status: DISCONTINUED | OUTPATIENT
Start: 2022-09-11 | End: 2022-09-11

## 2022-09-11 RX ORDER — SENNOSIDES 8.6 MG/1
8.6 TABLET ORAL DAILY PRN
Status: DISCONTINUED | OUTPATIENT
Start: 2022-09-11 | End: 2022-09-15 | Stop reason: HOSPADM

## 2022-09-11 RX ADMIN — POLYETHYLENE GLYCOL 3350 17 G: 17 POWDER, FOR SOLUTION ORAL at 10:09

## 2022-09-11 RX ADMIN — IOHEXOL 100 ML: 350 INJECTION, SOLUTION INTRAVENOUS at 01:09

## 2022-09-11 RX ADMIN — ENOXAPARIN SODIUM 40 MG: 100 INJECTION SUBCUTANEOUS at 04:09

## 2022-09-11 RX ADMIN — ACETAMINOPHEN 650 MG: 325 TABLET ORAL at 01:09

## 2022-09-11 RX ADMIN — CEFTRIAXONE 2 G: 2 INJECTION, SOLUTION INTRAVENOUS at 10:09

## 2022-09-11 RX ADMIN — CEFTRIAXONE 1 G: 1 INJECTION, SOLUTION INTRAVENOUS at 12:09

## 2022-09-11 RX ADMIN — ACETAMINOPHEN 650 MG: 325 TABLET ORAL at 09:09

## 2022-09-11 NOTE — HPI
Yenifer Chatterjee is a 56 year old female with a history of bacteremia and ureteral stone s/p right ureteral stent on 9/4/22.     Patient was discharged on 9/7/22 with two weeks of culture appropriate ciprofloxacin, repeat blood cultures were negative at the time of discharge. Urology was notified of a positive result in 1/2 blood cultures last night and the patient was advised to return to the ED for assessment. She states that she has been very fatigued at home, and had subjective fevers as recently as Friday.     On assessment, patient is AF, HDS though mildly tachycardic on initial presentation. Leukocytosis to 15, Cr 0.8 (baseline 0.8). UA LE positive, nitrite negative. Repeat CT shows prior R ureteral stone now in the right renal pelvis, ureteral stent in place with the distal coil in the proximal urethra.

## 2022-09-11 NOTE — HPI
Ms. Chatterjee is a 56 y.o F w/ no known PMH who was initially admitted to [facility] for evaluation of fatigue, abdominal symptoms with subjective fevers. Imaging showed concerns for  9mm right proximal ureteral stone with mild proximal hydronephrosis and Urology placed right ureteral stent on 09/04 with improvement in symptoms. Blood cultures grew gram negative rods and patient was started on Ceftriaxone with plans for stone treatment after full two week course of culture appropriate antibiotics. Infectious Diseases was initially  consulted for GNR bacteremia with cultures growing Klebsiella and recommendations of Ciprofloxacin 750mg BID until stent removal.     She was discharged on 9/7 on oral medications. She states that she was gaining strength, and did not have fever, dysuria, or flank pain. She states that 9/10 was one of her best days since leaving the hospital. On 9/10, her blood culture result from 9/5 was amended (after being labeled as having no growth) to growing gram negative rods in one bottle. This result was called to the charge nurse, followed by the urology resident, and out of an abundance of caution, she was called back to the hospital.     She has no fever, no dysuria, and no flank pain. She does have back pain from sleeping in the hospital bed. Her WBC was elevated at 15 from 9 at discharge. Urinalysis showed leukocyte esterase and blood. CT showed persistent right sided perinephric stranding. Blood cultures were taken, and she was placed back on IV antibiotics.

## 2022-09-11 NOTE — H&P
[facility] Medicine  History & Physical    Patient Name: Yenifer Chatterjee  MRN: [id number]  Patient Class: IP- Inpatient  Admission Date: 9/10/2022  Attending Physician: Jennifer Owen MD   Primary Care Provider: Primary Doctor No    Patient information was obtained from patient and ER records.     Subjective:     Principal Problem:Positive blood cultures    Chief Complaint:   Chief Complaint   Patient presents with    Blood Infection     Pt told to come back to ER for +blood cultures. Had cystoscopy on 9/4. Pt endorses SOB & cough since last Friday and subjective fever yesterday.         HPI: The patient is a 56 year old female with no past medical history but was recently hospitalized on 9/4 for right nephrolithiasis complicated by right pyelonephritis and cystitis on s/p right ureteral stent placement. She was discharged at that time on PO ciprofloxacin. Her blood cultures from 9/4 became positive for klebsiella pneumoniae and Bcx from 9/5 were initially positive for GNRs but since seem to have been changed to NGTD. She was called by urology to present to the ED due to her bacteremia. Since then she has continued to feel fatigued and nauseated. She reports a cough that she has had since 9/4 but states that it is slowly resolving. She denies fever, chills, chest pain, palpitations, or vomiting. She denies dysuria and reports that her hematuria resolved on 9/9.    History reviewed. No pertinent past medical history.    Past Surgical History:   Procedure Laterality Date    CYSTOSCOPY W/ URETERAL STENT PLACEMENT Right 9/4/2022    Procedure: CYSTOSCOPY, WITH URETERAL STENT INSERTION;  Surgeon: Ree Pak MD;  Location: [facility] OR [address] [place] [address];  Service: Urology;  Laterality: Right;    RETROGRADE PYELOGRAPHY Right 9/4/2022    Procedure: PYELOGRAM, RETROGRADE;  Surgeon: Ree Pak MD;  Location: [facility] OR [address] [place] [address];  Service: Urology;  Laterality: Right;       Review of patient's allergies  indicates:  No Known Allergies    No current facility-administered medications on file prior to encounter.     Current Outpatient Medications on File Prior to Encounter   Medication Sig    ciprofloxacin HCl (CIPRO) 500 MG tablet Take 1.5 tablets (750 mg total) by mouth every 12 (twelve) hours. for 14 days    ondansetron (ZOFRAN-ODT) 4 MG TbDL Take 1 tablet (4 mg total) by mouth every 6 (six) hours as needed (nausea vomiting). (Patient not taking: Reported on 9/8/2022)    oxybutynin (DITROPAN) 5 MG Tab Take 1 tablet (5 mg total) by mouth 3 (three) times daily.    tamsulosin (FLOMAX) 0.4 mg Cap Take 1 capsule (0.4 mg total) by mouth once daily. for 14 days     Family History       Problem Relation (Age of Onset)    Diabetes Mother, Father    Hypertension Mother, Father          Tobacco Use    Smoking status: Never    Smokeless tobacco: Never   Substance and Sexual Activity    Alcohol use: No    Drug use: No    Sexual activity: Yes     Partners: Male     Review of Systems   Constitutional:  Negative for chills, diaphoresis and fever.   HENT:  Negative for congestion, rhinorrhea, sinus pressure, sinus pain and sore throat.    Eyes:  Negative for pain, redness and visual disturbance.   Respiratory:  Positive for cough. Negative for chest tightness, shortness of breath and wheezing.    Cardiovascular:  Negative for chest pain, palpitations and leg swelling.   Gastrointestinal:  Positive for constipation and nausea. Negative for abdominal pain, blood in stool, diarrhea and vomiting.   Genitourinary:  Negative for difficulty urinating, dysuria, flank pain and hematuria.   Musculoskeletal:  Negative for arthralgias, back pain and joint swelling.   Skin:  Negative for pallor and rash.   Allergic/Immunologic: Negative.    Neurological:  Negative for dizziness, weakness, light-headedness, numbness and headaches.   Psychiatric/Behavioral:  Negative for agitation, behavioral problems and confusion. The patient is not  nervous/anxious.    Objective:     Vital Signs (Most Recent):  Temp: 98.2 °F (36.8 °C) (09/11/22 0752)  Pulse: 80 (09/11/22 0835)  Resp: 18 (09/11/22 0752)  BP: 113/62 (09/11/22 0752)  SpO2: 97 % (09/11/22 0752) Vital Signs (24h Range):  Temp:  [97.4 °F (36.3 °C)-98.8 °F (37.1 °C)] 98.2 °F (36.8 °C)  Pulse:  [] 80  Resp:  [16-20] 18  SpO2:  [94 %-99 %] 97 %  BP: (113-130)/(59-76) 113/62     Weight: 102.1 kg (225 lb)  Body mass index is 38.62 kg/m².    Physical Exam  Constitutional:       General: She is not in acute distress.     Appearance: Normal appearance. She is obese.   HENT:      Head: Normocephalic and atraumatic.      Right Ear: External ear normal.      Left Ear: External ear normal.      Nose: No congestion or rhinorrhea.      Mouth/Throat:      Mouth: Mucous membranes are moist.      Pharynx: Oropharynx is clear.   Eyes:      Extraocular Movements: Extraocular movements intact.      Conjunctiva/sclera: Conjunctivae normal.   Cardiovascular:      Rate and Rhythm: Normal rate and regular rhythm.      Heart sounds: Normal heart sounds. No murmur heard.  Pulmonary:      Effort: Pulmonary effort is normal.      Breath sounds: Normal breath sounds. No wheezing or rales.   Abdominal:      General: Abdomen is flat. Bowel sounds are normal. There is no distension.      Palpations: Abdomen is soft.      Tenderness: There is no abdominal tenderness. There is no right CVA tenderness or left CVA tenderness.   Musculoskeletal:         General: No swelling.      Cervical back: Normal range of motion and neck supple. No tenderness.      Right lower leg: No edema.      Left lower leg: No edema.   Skin:     General: Skin is warm and dry.      Capillary Refill: Capillary refill takes less than 2 seconds.      Findings: No bruising or rash.   Neurological:      Mental Status: She is alert and oriented to person, place, and time. Mental status is at baseline.      Sensory: No sensory deficit.      Motor: No weakness.    Psychiatric:         Mood and Affect: Mood normal.         Behavior: Behavior normal.           Significant Labs: All pertinent labs within the past 24 hours have been reviewed.  CBC:   Recent Labs   Lab 09/10/22  2220   WBC 15.10*   HGB 11.1*   HCT 31.6*        CMP:   Recent Labs   Lab 09/10/22  2228   *   K 4.1      CO2 20*      BUN 20   CREATININE 0.8   CALCIUM 8.8   PROT 6.4   ALBUMIN 2.3*   BILITOT 0.4   ALKPHOS 111   AST 39   ALT 47*   ANIONGAP 9     Urine Studies:   Recent Labs   Lab 09/10/22  2258   COLORU Yellow   APPEARANCEUA Hazy*   PHUR 6.0   SPECGRAV 1.020   PROTEINUA 1+*   GLUCUA Negative   KETONESU Negative   BILIRUBINUA Negative   OCCULTUA 3+*   NITRITE Negative   LEUKOCYTESUR 3+*   RBCUA >100*   WBCUA 61*   BACTERIA Many*   SQUAMEPITHEL 2   HYALINECASTS 0       Significant Imaging: I have reviewed all pertinent imaging results/findings within the past 24 hours.    Assessment/Plan:     * Positive blood cultures  BCx 9/4 positive for pan-sensitive klebsiella pneumonia (consistent w/ urinary cultures). BCx 9/5 positive for GNRs. Was previously treated with PO ciprofloxacin. Received 1 dose of IV CTX in ED. 2L IV fluids. Hemodynamically stable, does not meet SIRs criteria, do not suspect sepsis at this time.    - Repeat BCx  - Continue IV CTX  - ID consult for bacteremia      Other specified anemias  Hgb 11.1 on admission, similar to prior hospitalization. Iron studies consistent with iron deficiency.    - Consider PO iron supplementation after infection cleared  - Trend CBC    Hypoalbuminemia  Albumin 2.3 on admission. Pt. Reports poor appetite since 9/5 which would be unlikely to explain low albumin over short time period.    - Nutrition consult    Right ureteral calculus  Right 7mm ureteral calculus s/p ureteral stent 9/4. No flank pain on exam. Hematuria resolving.    - Urology consult, appreciate recs      VTE Risk Mitigation (From admission, onward)           Ordered      enoxaparin injection 40 mg  Daily         09/11/22 0519     IP VTE HIGH RISK PATIENT  Once         09/11/22 0519     Place sequential compression device  Until discontinued         09/11/22 0519                    Altaf Mclean MD  Department of [facility] Medicine   [facility] - Surgery

## 2022-09-11 NOTE — ASSESSMENT & PLAN NOTE
Hgb 11.1 on admission, similar to prior hospitalization. Iron studies consistent with iron deficiency.    - Consider PO iron supplementation after infection cleared  - Trend CBC

## 2022-09-11 NOTE — SUBJECTIVE & OBJECTIVE
History reviewed. No pertinent past medical history.    Past Surgical History:   Procedure Laterality Date    CYSTOSCOPY W/ URETERAL STENT PLACEMENT Right 9/4/2022    Procedure: CYSTOSCOPY, WITH URETERAL STENT INSERTION;  Surgeon: Ree Pak MD;  Location: [address];  Service: Urology;  Laterality: Right;    RETROGRADE PYELOGRAPHY Right 9/4/2022    Procedure: PYELOGRAM, RETROGRADE;  Surgeon: Ree Pak MD;  Location: [address];  Service: Urology;  Laterality: Right;       Review of patient's allergies indicates:  No Known Allergies    No current facility-administered medications on file prior to encounter.     Current Outpatient Medications on File Prior to Encounter   Medication Sig    ciprofloxacin HCl (CIPRO) 500 MG tablet Take 1.5 tablets (750 mg total) by mouth every 12 (twelve) hours. for 14 days    ondansetron (ZOFRAN-ODT) 4 MG TbDL Take 1 tablet (4 mg total) by mouth every 6 (six) hours as needed (nausea vomiting). (Patient not taking: Reported on 9/8/2022)    oxybutynin (DITROPAN) 5 MG Tab Take 1 tablet (5 mg total) by mouth 3 (three) times daily.    tamsulosin (FLOMAX) 0.4 mg Cap Take 1 capsule (0.4 mg total) by mouth once daily. for 14 days     Family History       Problem Relation (Age of Onset)    Diabetes Mother, Father    Hypertension Mother, Father          Tobacco Use    Smoking status: Never    Smokeless tobacco: Never   Substance and Sexual Activity    Alcohol use: No    Drug use: No    Sexual activity: Yes     Partners: Male     Review of Systems   Constitutional:  Negative for chills, diaphoresis and fever.   HENT:  Negative for congestion, rhinorrhea, sinus pressure, sinus pain and sore throat.    Eyes:  Negative for pain, redness and visual disturbance.   Respiratory:  Positive for cough. Negative for chest tightness, shortness of breath and wheezing.    Cardiovascular:  Negative for chest pain, palpitations and leg swelling.   Gastrointestinal:  Positive for  constipation and nausea. Negative for abdominal pain, blood in stool, diarrhea and vomiting.   Genitourinary:  Negative for difficulty urinating, dysuria, flank pain and hematuria.   Musculoskeletal:  Negative for arthralgias, back pain and joint swelling.   Skin:  Negative for pallor and rash.   Allergic/Immunologic: Negative.    Neurological:  Negative for dizziness, weakness, light-headedness, numbness and headaches.   Psychiatric/Behavioral:  Negative for agitation, behavioral problems and confusion. The patient is not nervous/anxious.    Objective:     Vital Signs (Most Recent):  Temp: 98.2 °F (36.8 °C) (09/11/22 0752)  Pulse: 80 (09/11/22 0835)  Resp: 18 (09/11/22 0752)  BP: 113/62 (09/11/22 0752)  SpO2: 97 % (09/11/22 0752) Vital Signs (24h Range):  Temp:  [97.4 °F (36.3 °C)-98.8 °F (37.1 °C)] 98.2 °F (36.8 °C)  Pulse:  [] 80  Resp:  [16-20] 18  SpO2:  [94 %-99 %] 97 %  BP: (113-130)/(59-76) 113/62     Weight: 102.1 kg (225 lb)  Body mass index is 38.62 kg/m².    Physical Exam  Constitutional:       General: She is not in acute distress.     Appearance: Normal appearance. She is obese.   HENT:      Head: Normocephalic and atraumatic.      Right Ear: External ear normal.      Left Ear: External ear normal.      Nose: No congestion or rhinorrhea.      Mouth/Throat:      Mouth: Mucous membranes are moist.      Pharynx: Oropharynx is clear.   Eyes:      Extraocular Movements: Extraocular movements intact.      Conjunctiva/sclera: Conjunctivae normal.   Cardiovascular:      Rate and Rhythm: Normal rate and regular rhythm.      Heart sounds: Normal heart sounds. No murmur heard.  Pulmonary:      Effort: Pulmonary effort is normal.      Breath sounds: Normal breath sounds. No wheezing or rales.   Abdominal:      General: Abdomen is flat. Bowel sounds are normal. There is no distension.      Palpations: Abdomen is soft.      Tenderness: There is no abdominal tenderness. There is no right CVA tenderness or left  CVA tenderness.   Musculoskeletal:         General: No swelling.      Cervical back: Normal range of motion and neck supple. No tenderness.      Right lower leg: No edema.      Left lower leg: No edema.   Skin:     General: Skin is warm and dry.      Capillary Refill: Capillary refill takes less than 2 seconds.      Findings: No bruising or rash.   Neurological:      Mental Status: She is alert and oriented to person, place, and time. Mental status is at baseline.      Sensory: No sensory deficit.      Motor: No weakness.   Psychiatric:         Mood and Affect: Mood normal.         Behavior: Behavior normal.           Significant Labs: All pertinent labs within the past 24 hours have been reviewed.  CBC:   Recent Labs   Lab 09/10/22  2220   WBC 15.10*   HGB 11.1*   HCT 31.6*        CMP:   Recent Labs   Lab 09/10/22  2228   *   K 4.1      CO2 20*      BUN 20   CREATININE 0.8   CALCIUM 8.8   PROT 6.4   ALBUMIN 2.3*   BILITOT 0.4   ALKPHOS 111   AST 39   ALT 47*   ANIONGAP 9     Urine Studies:   Recent Labs   Lab 09/10/22  2258   COLORU Yellow   APPEARANCEUA Hazy*   PHUR 6.0   SPECGRAV 1.020   PROTEINUA 1+*   GLUCUA Negative   KETONESU Negative   BILIRUBINUA Negative   OCCULTUA 3+*   NITRITE Negative   LEUKOCYTESUR 3+*   RBCUA >100*   WBCUA 61*   BACTERIA Many*   SQUAMEPITHEL 2   HYALINECASTS 0       Significant Imaging: I have reviewed all pertinent imaging results/findings within the past 24 hours.

## 2022-09-11 NOTE — HPI
The patient is a 56 year old female with no past medical history but was recently hospitalized on 9/4 for right nephrolithiasis complicated by right pyelonephritis and cystitis on s/p right ureteral stent placement. She was discharged at that time on PO ciprofloxacin. Her blood cultures from 9/4 became positive for klebsiella pneumoniae and Bcx from 9/5 were initially positive for GNRs but since seem to have been changed to NGTD. She was called by urology to present to the ED due to her bacteremia. Since then she has continued to feel fatigued and nauseated. She reports a cough that she has had since 9/4 but states that it is slowly resolving. She denies fever, chills, chest pain, palpitations, or vomiting. She denies dysuria and reports that her hematuria resolved on 9/9.

## 2022-09-11 NOTE — NURSING
Nurses Note -- 4 Eyes      9/11/2022   12:43 PM      Skin assessed during: Admit      [x] No Pressure Injuries Present    []Prevention Measures Documented      [] Yes- Altered Skin Integrity Present or Discovered   [] LDA Added if Not in Epic (Describe Wound)   [] New Altered Skin Integrity was Present on Admit and Documented in LDA   [] Wound Image Taken    Wound Care Consulted? No    Attending Nurse:  Pina Frey RN     Second RN/Staff Member: Siddharth Hernandes

## 2022-09-11 NOTE — ED PROVIDER NOTES
Encounter Date: 9/10/2022       History     Chief Complaint   Patient presents with    Blood Infection     Pt told to come back to ER for +blood cultures. Had cystoscopy on 9/4. Pt endorses SOB & cough since last Friday and subjective fever yesterday.      56 year old female s/p right retrograde pyelography and cystoscopy w/ ureteral stent placement who presents to ED after urologist referral for positive blood cultures (Klebsiella) and symptoms of weakness, shortness of breath, and chills.  Patient's  and cousin are at bedside.  On September 4th, patient had a stent placed for kidney stones and a kidney and bladder infection.  Patient was given ciprofloxacin.  Patient endorses also cough, constipation, and intermittent headaches for which she uses Tylenol.  Patient reports that she is not certain about fevers since she did not take any temperatures.  Patient denies any nausea, vomiting, and abdominal pain.    The history is provided by the patient, a relative and the spouse. No  was used.   Review of patient's allergies indicates:  No Known Allergies  History reviewed. No pertinent past medical history.  Past Surgical History:   Procedure Laterality Date    CYSTOSCOPY W/ URETERAL STENT PLACEMENT Right 9/4/2022    Procedure: CYSTOSCOPY, WITH URETERAL STENT INSERTION;  Surgeon: Ree Pak MD;  Location: [facility] OR [address] [place] [address];  Service: Urology;  Laterality: Right;    RETROGRADE PYELOGRAPHY Right 9/4/2022    Procedure: PYELOGRAM, RETROGRADE;  Surgeon: Ree Pak MD;  Location: [facility] OR [address] [place] [address];  Service: Urology;  Laterality: Right;     Family History   Problem Relation Age of Onset    Diabetes Mother     Hypertension Mother     Diabetes Father     Hypertension Father      Social History     Tobacco Use    Smoking status: Never    Smokeless tobacco: Never   Substance Use Topics    Alcohol use: No    Drug use: No     Review of Systems   Constitutional:  Positive for chills and fatigue.    HENT:  Negative for sore throat.    Respiratory:  Positive for cough and shortness of breath.    Cardiovascular:  Negative for chest pain.   Gastrointestinal:  Positive for constipation. Negative for abdominal pain, nausea and vomiting.   Genitourinary:  Negative for difficulty urinating.   Skin:  Negative for rash.   Neurological:  Positive for weakness and headaches.     Physical Exam     Initial Vitals [09/10/22 2059]   BP Pulse Resp Temp SpO2   123/76 (!) 114 20 97.6 °F (36.4 °C) 97 %      MAP       --         Physical Exam    Nursing note and vitals reviewed.  Constitutional: She appears well-developed and well-nourished. No distress.   Patient is laying in bed in no apparent distress.  She is speaking in full sentences.   HENT:   Head: Normocephalic.   Mouth/Throat: Oropharynx is clear and moist.   Eyes: Conjunctivae are normal. No scleral icterus.   Neck: Neck supple.   Cardiovascular:  Normal rate, regular rhythm and normal heart sounds.           Pulmonary/Chest: Breath sounds normal.   Abdominal: Abdomen is soft. She exhibits no distension. There is no abdominal tenderness. There is no rebound and no guarding.   Musculoskeletal:         General: Normal range of motion.      Cervical back: Neck supple.     Neurological: She is alert and oriented to person, place, and time.   Skin: Skin is warm. Capillary refill takes less than 2 seconds.   Psychiatric: She has a normal mood and affect.       ED Course   Procedures  Labs Reviewed   CULTURE, BLOOD   CBC W/ AUTO DIFFERENTIAL   COMPREHENSIVE METABOLIC PANEL   LACTIC ACID, PLASMA   URINALYSIS, REFLEX TO URINE CULTURE          Imaging Results    None          Medications   sodium chloride 0.9% bolus 1,000 mL (has no administration in time range)     Medical Decision Making:   Initial Assessment:   56 year old female s/p right retrograde pyelography and cystoscopy w/ ureteral stent placement who presents to ED for emergent evaluation of shortness of breath and  weakness after positive blood cultures.  Patient is laying in the bed in no apparent distress.  She is tachycardic.  Differential Diagnosis:   - Sepsis:  Patient has apparent positive blood cultures with Klebsiella.  - COVID:  Possible diagnosis.  Patient has cough, intermittent headaches, chills, and weakness.  - Pneumonia:  Possible diagnosis.  Patient has cough, chills, and weakness.  Clinical Tests:   Lab Tests: Ordered and Reviewed  Radiological Study: Ordered and Reviewed  Medical Tests: Ordered and Reviewed  ED Management:  Patient presents with shortness of breath weakness.  Obtained history and physical exam.  Ordered EKG which showed normal sinus rhythm.  Ordered sepsis workup, chest x-ray, and rapid COVID.  Ordered 1 L of sodium chloride bolus and Rocephin.  CBC shows leukocytosis.  Chest x-ray shows no acute abnormalities.  Please refer to ED Course for additional details.    Discussed patient with change of shift ED team.  Change of shift ED team will follow up with patient..           ED Course as of 09/10/22 2349   Sat Sep 10, 2022   2158 Patient referred by urology and due to bacteremia.  Patient is on culture appropriate ciprofloxacin for her positive blood culture for Klebsiella.  However, the patient reported generalized fatigue, weakness, shortness of breath, and cough.  Therefore, will treat patient with Rocephin.  Will perform sepsis workup.  Will provide IV fluids given tachycardia.  Will obtain chest x-ray given shortness of breath and cough.  On upper right broad-spectrum antibiotic coverage given positive culture results.  However, will broaden coverage if lactic acid is elevated, patient develops hypotension or another source of infection is found. [DS]      ED Course User Index  [DS] Raymond Carmona MD             Clinical Impression:   Final diagnoses:  [R53.1] Weakness               Sean Park MD  Resident  09/11/22 0023

## 2022-09-11 NOTE — SUBJECTIVE & OBJECTIVE
History reviewed. No pertinent past medical history.    Past Surgical History:   Procedure Laterality Date    CYSTOSCOPY W/ URETERAL STENT PLACEMENT Right 9/4/2022    Procedure: CYSTOSCOPY, WITH URETERAL STENT INSERTION;  Surgeon: Ree Pak MD;  Location: [address];  Service: Urology;  Laterality: Right;    RETROGRADE PYELOGRAPHY Right 9/4/2022    Procedure: PYELOGRAM, RETROGRADE;  Surgeon: Ree Pak MD;  Location: [address];  Service: Urology;  Laterality: Right;       Review of patient's allergies indicates:  No Known Allergies    Medications:  Medications Prior to Admission   Medication Sig    ciprofloxacin HCl (CIPRO) 500 MG tablet Take 1.5 tablets (750 mg total) by mouth every 12 (twelve) hours. for 14 days    ondansetron (ZOFRAN-ODT) 4 MG TbDL Take 1 tablet (4 mg total) by mouth every 6 (six) hours as needed (nausea vomiting). (Patient not taking: Reported on 9/8/2022)    oxybutynin (DITROPAN) 5 MG Tab Take 1 tablet (5 mg total) by mouth 3 (three) times daily.    tamsulosin (FLOMAX) 0.4 mg Cap Take 1 capsule (0.4 mg total) by mouth once daily. for 14 days     Antibiotics (From admission, onward)      Start     Stop Route Frequency Ordered    09/11/22 0932  cefTRIAXone (ROCEPHIN) 2 g/50 mL D5W IVPB         -- IV Every 24 hours (non-standard times) 09/11/22 0932          Antifungals (From admission, onward)      None          Antivirals (From admission, onward)      None               There is no immunization history on file for this patient.    Family History       Problem Relation (Age of Onset)    Diabetes Mother, Father    Hypertension Mother, Father          Social History     Socioeconomic History    Marital status:    Tobacco Use    Smoking status: Never    Smokeless tobacco: Never   Substance and Sexual Activity    Alcohol use: No    Drug use: No    Sexual activity: Yes     Partners: Male     Review of Systems   Constitutional:  Positive for fatigue. Negative for  fever.   Gastrointestinal:  Negative for abdominal distention and abdominal pain.   Genitourinary:  Positive for frequency. Negative for dysuria and flank pain.   Neurological:  Positive for weakness.   All other systems reviewed and are negative.  Objective:     Vital Signs (Most Recent):  Temp: 98.2 °F (36.8 °C) (09/11/22 0752)  Pulse: 80 (09/11/22 0835)  Resp: 18 (09/11/22 0752)  BP: 113/62 (09/11/22 0752)  SpO2: 97 % (09/11/22 0752) Vital Signs (24h Range):  Temp:  [97.4 °F (36.3 °C)-98.8 °F (37.1 °C)] 98.2 °F (36.8 °C)  Pulse:  [] 80  Resp:  [16-20] 18  SpO2:  [94 %-99 %] 97 %  BP: (113-130)/(59-76) 113/62     Weight: 102.1 kg (225 lb)  Body mass index is 38.62 kg/m².    Estimated Creatinine Clearance: 89.2 mL/min (based on SCr of 0.8 mg/dL).    Physical Exam  Vitals and nursing note reviewed.   Constitutional:       Appearance: Normal appearance.   HENT:      Head: Normocephalic and atraumatic.   Eyes:      Extraocular Movements: Extraocular movements intact.      Conjunctiva/sclera: Conjunctivae normal.      Pupils: Pupils are equal, round, and reactive to light.   Cardiovascular:      Rate and Rhythm: Normal rate and regular rhythm.      Pulses: Normal pulses.      Heart sounds: Normal heart sounds.   Pulmonary:      Effort: Pulmonary effort is normal.      Breath sounds: Normal breath sounds.   Abdominal:      General: Abdomen is flat. Bowel sounds are normal.      Palpations: Abdomen is soft.      Tenderness: There is no right CVA tenderness or left CVA tenderness.   Musculoskeletal:         General: Normal range of motion.      Right lower leg: No edema.      Left lower leg: No edema.   Skin:     General: Skin is warm and dry.      Findings: No erythema.   Neurological:      Mental Status: She is alert.       Significant Labs: Blood Culture:   Recent Labs   Lab 09/04/22  1532 09/04/22  1552 09/05/22  1234 09/10/22  2219 09/10/22  2220   LABBLOO Gram stain aer bottle: Gram negative rods  Gram stain  alta bottle: Gram negative rods  Results called to and read back by: Adenike Ross RN 09/05/2022  07:02  KLEBSIELLA PNEUMONIAE* Gram stain aer bottle: Gram negative rods  Gram stain alta bottle: Gram negative rods  Results called to and read back by: Adenike Ross RN 09/05/2022  07:02  KLEBSIELLA PNEUMONIAE  For susceptibility see order #[id number]  * No growth after 5 days.  Gram stain aer bottle: Gram negative rods  Results called to and read back by: Los Kwnog, Charge Nurse 09/10/2022  20:05  No growth after 5 days. No Growth to date No Growth to date     CBC:   Recent Labs   Lab 09/10/22  2220   WBC 15.10*   HGB 11.1*   HCT 31.6*        CMP:   Recent Labs   Lab 09/10/22  2228   *   K 4.1      CO2 20*      BUN 20   CREATININE 0.8   CALCIUM 8.8   PROT 6.4   ALBUMIN 2.3*   BILITOT 0.4   ALKPHOS 111   AST 39   ALT 47*   ANIONGAP 9     Urine Culture:   Recent Labs   Lab 09/04/22  1517   LABURIN KLEBSIELLA PNEUMONIAE  >100,000 cfu/ml  *     Urine Studies:   Recent Labs   Lab 09/10/22  2258   COLORU Yellow   APPEARANCEUA Hazy*   PHUR 6.0   SPECGRAV 1.020   PROTEINUA 1+*   GLUCUA Negative   KETONESU Negative   BILIRUBINUA Negative   OCCULTUA 3+*   NITRITE Negative   LEUKOCYTESUR 3+*   RBCUA >100*   WBCUA 61*   BACTERIA Many*   SQUAMEPITHEL 2   HYALINECASTS 0       Significant Imaging: CT: I have reviewed all pertinent results/findings within the past 24 hours:  Persistent asymmetrically delayed enhancement of the right kidney, with mild perinephric stranding and heterogeneous parenchymal hypoattenuation.  Consider correlation with urinalysis to exclude pyelonephritis.

## 2022-09-11 NOTE — CONSULTS
[facility] - Surgery  Urology  Consult Note    Patient Name: Yenifer Chatterjee  MRN: [id number]  Admission Date: 9/10/2022  Hospital Length of Stay: 0   Code Status: Full Code   Attending Provider: Jennifer Owen MD   Consulting Provider: Siddharth Simmons MD  Primary Care Physician: Primary Doctor No  Principal Problem:Positive blood cultures    Inpatient consult to Urology  Consult performed by: Siddharth Simmons MD  Consult ordered by: Altaf Mclean MD          Subjective:     HPI:  Yenifer Chatterjee is a 56 year old female with a history of bacteremia and ureteral stone s/p right ureteral stent on 9/4/22.     Patient was discharged on 9/7/22 with two weeks of culture appropriate ciprofloxacin, repeat blood cultures were negative at the time of discharge. Urology was notified of a positive result in 1/2 blood cultures last night and the patient was advised to return to the ED for assessment. She states that she has been very fatigued at home, and had subjective fevers as recently as Friday.     On assessment, patient is AF, HDS though mildly tachycardic on initial presentation. Leukocytosis to 15, Cr 0.8 (baseline 0.8). UA LE positive, nitrite negative. Repeat CT shows prior R ureteral stone now in the right renal pelvis, ureteral stent in place with the distal coil in the proximal urethra.       History reviewed. No pertinent past medical history.    Past Surgical History:   Procedure Laterality Date    CYSTOSCOPY W/ URETERAL STENT PLACEMENT Right 9/4/2022    Procedure: CYSTOSCOPY, WITH URETERAL STENT INSERTION;  Surgeon: Ree Pak MD;  Location: [facility] OR [address] [place] [address];  Service: Urology;  Laterality: Right;    RETROGRADE PYELOGRAPHY Right 9/4/2022    Procedure: PYELOGRAM, RETROGRADE;  Surgeon: Ree Pak MD;  Location: [facility] OR [address] [place] [address];  Service: Urology;  Laterality: Right;       Review of patient's allergies indicates:  No Known Allergies    Family History       Problem Relation (Age of Onset)    Diabetes Mother,  Father    Hypertension Mother, Father            Tobacco Use    Smoking status: Never    Smokeless tobacco: Never   Substance and Sexual Activity    Alcohol use: No    Drug use: No    Sexual activity: Yes     Partners: Male       Review of Systems   Constitutional:  Positive for activity change, fatigue and fever. Negative for chills.   Respiratory:  Positive for cough. Negative for shortness of breath.    Cardiovascular:  Negative for chest pain.   Gastrointestinal:  Negative for abdominal pain, diarrhea, nausea and vomiting.   Genitourinary:  Positive for frequency. Negative for difficulty urinating, flank pain, hematuria and urgency.   Neurological:  Positive for weakness. Negative for dizziness.     Objective:     Temp:  [97.4 °F (36.3 °C)-98.8 °F (37.1 °C)] 98.2 °F (36.8 °C)  Pulse:  [] 80  Resp:  [16-20] 18  SpO2:  [94 %-99 %] 97 %  BP: (113-130)/(59-76) 113/62     Body mass index is 38.62 kg/m².           Drains       None                   Physical Exam  Vitals reviewed.   Constitutional:       General: She is not in acute distress.     Appearance: She is well-developed. She is not diaphoretic.   HENT:      Head: Normocephalic and atraumatic.   Eyes:      General: No scleral icterus.  Cardiovascular:      Rate and Rhythm: Normal rate.   Pulmonary:      Effort: Pulmonary effort is normal. No respiratory distress.      Breath sounds: No stridor.   Abdominal:      General: There is no distension.      Palpations: Abdomen is soft.      Tenderness: There is no abdominal tenderness. There is no right CVA tenderness or left CVA tenderness.   Genitourinary:     Comments: Ureteral stent not visible at meatus  Musculoskeletal:         General: Normal range of motion.      Cervical back: Normal range of motion.   Skin:     General: Skin is warm and dry.   Neurological:      Mental Status: She is alert.   Psychiatric:         Behavior: Behavior normal.       Significant Labs:    BMP:  Recent Labs   Lab  09/06/22  0226 09/07/22  0349 09/10/22  2228   * 139 135*   K 4.1 4.0 4.1    108 106   CO2 23 22* 20*   BUN 18 13 20   CREATININE 0.8 0.7 0.8   CALCIUM 8.6* 8.7 8.8       CBC:  Recent Labs   Lab 09/06/22  0226 09/07/22  0349 09/10/22  2220   WBC 10.47 9.92 15.10*   HGB 11.3* 10.4* 11.1*   HCT 33.9* 30.8* 31.6*   PLT 88* 90* 207       Blood Culture:   Recent Labs   Lab 09/05/22  1234 09/10/22  2219 09/10/22  2220   LABBLOO No growth after 5 days.  Gram stain aer bottle: Gram negative rods  Results called to and read back by: Los Kwong, Charge Nurse 09/10/2022  20:05  No growth after 5 days. No Growth to date No Growth to date     Urine Culture:   Recent Labs   Lab 09/04/22  1517   LABURIN KLEBSIELLA PNEUMONIAE  >100,000 cfu/ml  *     Urine Studies:   Recent Labs   Lab 09/04/22  1517 09/10/22  2258   COLORU Yellow Yellow   APPEARANCEUA Hazy* Hazy*   PHUR 6.0 6.0   SPECGRAV >1.030* 1.020   PROTEINUA 2+* 1+*   GLUCUA Negative Negative   KETONESU Trace* Negative   BILIRUBINUA Negative Negative   OCCULTUA 3+* 3+*   NITRITE Negative Negative   LEUKOCYTESUR 3+* 3+*   RBCUA 24* >100*   WBCUA >100* 61*   BACTERIA Many* Many*   SQUAMEPITHEL 2 2   HYALINECASTS 0 0       Significant Imaging:  Findings as above                      Assessment and Plan:     Right ureteral calculus  Yenifer Chatterjee is a 56 year old female with a history of bacteremia and ureteral stone s/p right ureteral stent on 9/4/22.   - no present indication for urologic intervention, plan to continue antibiotics through definitive stone treatment  - in and out catheterization performed, urine for culture  - agree with IV antibiotics  - blood cultures pending, follow up results        VTE Risk Mitigation (From admission, onward)         Ordered     enoxaparin injection 40 mg  Daily         09/11/22 0519     IP VTE HIGH RISK PATIENT  Once         09/11/22 0519     Place sequential compression device  Until discontinued         09/11/22 0519               Siddharth Simmons MD  Urology  [facility]

## 2022-09-11 NOTE — ASSESSMENT & PLAN NOTE
Right 7mm ureteral calculus s/p ureteral stent 9/4. No flank pain on exam. Hematuria resolving.    - Urology consult, appreciate recs

## 2022-09-11 NOTE — TELEPHONE ENCOUNTER
Urology Telephone Note    Notified of gram negative rods in 1/2 blood culture bottles on repeat blood cultures drawn 9/5. Patient was discharged on culture appropriate ciprofloxacin. States that she has still been very fatigued and weak since discharge. Recommended that patient return to the emergency room for assessment. She expressed understanding and will report to the ED at main campus.    Please call with further questions or concerns.    Siddharth Simmons MD  Urology, PGY-3  [facility]

## 2022-09-11 NOTE — ED NOTES
Telemetry Verification   Patient placed on Telemetry Box  Verified with War Room  Box # [id number]   Monitor Tech Ms. Carranza   Rate 100   Rhythm Normal Sinus

## 2022-09-11 NOTE — ASSESSMENT & PLAN NOTE
Yenifer Chatterjee is a 56 year old female with a history of bacteremia and ureteral stone s/p right ureteral stent on 9/4/22.   - no present indication for urologic intervention, plan to continue antibiotics through definitive stone treatment  - in and out catheterization performed, urine for culture  - agree with IV antibiotics  - blood cultures pending, follow up results

## 2022-09-11 NOTE — ASSESSMENT & PLAN NOTE
56 y.o F w/ no known PMH who was initially admitted to [facility] for evaluation of fatigue, abdominal symptoms with subjective fevers. Imaging showed concerns for  9mm right proximal ureteral stone with mild proximal hydronephrosis and Urology placed right ureteral stent on 09/04 with improvement in symptoms. Blood cultures grew gram negative rods and patient was started on Ceftriaxone with plans for stone treatment after full two week course of culture appropriate antibiotics. Infectious Diseases consulted for GNR bacteremia.     -Leukocytosis resolved, patient remains afebrile with improvement in symptoms.  -BC x2 growing GNR(Identification and susceptibility pending). Repeat BC NGTD.  -UC grew Klebsiella pneumoniae     Antibiotic Interpretation Value   Comment     Amp/Sulbactam Sensitive <=8/4 mcg/mL       Amox/K Clav'ate Sensitive <=8/4 mcg/mL       Ceftriaxone Sensitive <=1 mcg/mL       Cefazolin Sensitive <=2 mcg/mL       Ciprofloxacin Sensitive <=1 mcg/mL       Cefepime Sensitive <=2 mcg/mL       Ertapenem Sensitive <=0.5 mcg/mL       Nitrofurantoin Sensitive <=32 mcg/mL       Gentamicin Sensitive <=4 mcg/mL       Levofloxacin Sensitive <=2 mcg/mL       Meropenem Sensitive <=1 mcg/mL       Piperacillin/Tazo Sensitive <=16 mcg/mL       Trimeth/Sulfa Sensitive <=2/38 mcg/mL       Tobramycin Sensitive <=4 mcg/mL             RECOMMENDATIONS  -Discontinue Ceftriaxone.   -Start Ciprofloxacin 750mg BID until stent removal. Minimum therapy duration of atleast 14 days.  -Followup Blood cultures. Please contact ID if blood cultures resistant to Ciprofloxacin.

## 2022-09-11 NOTE — CONSULTS
[facility]  Infectious Disease  Consult Note    Patient Name: Yenifer Chatterjee  MRN: [id number]  Admission Date: 9/10/2022  Hospital Length of Stay: 0 days  Attending Physician: Jennifer Owen MD  Primary Care Provider: Primary Doctor No     Isolation Status: No active isolations    Patient information was obtained from patient, past medical records and ER records.      Inpatient consult to Infectious Diseases  Consult performed by: Woo Sanchez MD  Consult ordered by: Altaf Mclean MD        Assessment/Plan:     Pyelonephritis  55 yo female with pyelonephritis, nephrolithiasis s/p right ureteral stent who presents with positive urinalysis, elevated WBC. She was treated for Klebsiella bacteremia, but returned after blood cultures from 9/5 grew bacteria on 9/10. She has blood and urine cultures pending and is on ceftriaxone. She is afebrile and asymptomatic. While her positive blood culture does not represent treatment failure, and her symptoms do not suggest clinical failure, her elevated WBC is concerning. CT does not suggest source control as a concern at this time.      Recommenations:   Continue ceftriaxone for now.   Await repeat blood and urine cultures   Follow WBC.           Thank you for your consult. I will follow-up with patient. Please contact us if you have any additional questions.    Woo Sanchez MD  Infectious Disease  [facility]    Subjective:     Principal Problem: Positive blood cultures    HPI: Ms. Chatterjee is a 56 y.o F w/ no known PMH who was initially admitted to [facility] for evaluation of fatigue, abdominal symptoms with subjective fevers. Imaging showed concerns for  9mm right proximal ureteral stone with mild proximal hydronephrosis and Urology placed right ureteral stent on 09/04 with improvement in symptoms. Blood cultures grew gram negative rods and patient was started on Ceftriaxone with plans for stone treatment after full two week course of culture appropriate  antibiotics. Infectious Diseases was initially  consulted for GNR bacteremia with cultures growing Klebsiella and recommendations of Ciprofloxacin 750mg BID until stent removal.     She was discharged on 9/7 on oral medications. She states that she was gaining strength, and did not have fever, dysuria, or flank pain. She states that 9/10 was one of her best days since leaving the hospital. On 9/10, her blood culture result from 9/5 was amended (after being labeled as having no growth) to growing gram negative rods in one bottle. This result was called to the charge nurse, followed by the urology resident, and out of an abundance of caution, she was called back to the hospital.     She has no fever, no dysuria, and no flank pain. She does have back pain from sleeping in the hospital bed. Her WBC was elevated at 15 from 9 at discharge. Urinalysis showed leukocyte esterase and blood. CT showed persistent right sided perinephric stranding. Blood cultures were taken, and she was placed back on IV antibiotics.          History reviewed. No pertinent past medical history.    Past Surgical History:   Procedure Laterality Date    CYSTOSCOPY W/ URETERAL STENT PLACEMENT Right 9/4/2022    Procedure: CYSTOSCOPY, WITH URETERAL STENT INSERTION;  Surgeon: Ree Pak MD;  Location: [facility] OR [address] [place] [address];  Service: Urology;  Laterality: Right;    RETROGRADE PYELOGRAPHY Right 9/4/2022    Procedure: PYELOGRAM, RETROGRADE;  Surgeon: Ree Pak MD;  Location: [facility] OR [address] [place] [address];  Service: Urology;  Laterality: Right;       Review of patient's allergies indicates:  No Known Allergies    Medications:  Medications Prior to Admission   Medication Sig    ciprofloxacin HCl (CIPRO) 500 MG tablet Take 1.5 tablets (750 mg total) by mouth every 12 (twelve) hours. for 14 days    ondansetron (ZOFRAN-ODT) 4 MG TbDL Take 1 tablet (4 mg total) by mouth every 6 (six) hours as needed (nausea vomiting). (Patient not taking: Reported on 9/8/2022)     oxybutynin (DITROPAN) 5 MG Tab Take 1 tablet (5 mg total) by mouth 3 (three) times daily.    tamsulosin (FLOMAX) 0.4 mg Cap Take 1 capsule (0.4 mg total) by mouth once daily. for 14 days     Antibiotics (From admission, onward)      Start     Stop Route Frequency Ordered    09/11/22 0932  cefTRIAXone (ROCEPHIN) 2 g/50 mL D5W IVPB         -- IV Every 24 hours (non-standard times) 09/11/22 0932          Antifungals (From admission, onward)      None          Antivirals (From admission, onward)      None               There is no immunization history on file for this patient.    Family History       Problem Relation (Age of Onset)    Diabetes Mother, Father    Hypertension Mother, Father          Social History     Socioeconomic History    Marital status:    Tobacco Use    Smoking status: Never    Smokeless tobacco: Never   Substance and Sexual Activity    Alcohol use: No    Drug use: No    Sexual activity: Yes     Partners: Male     Review of Systems   Constitutional:  Positive for fatigue. Negative for fever.   Gastrointestinal:  Negative for abdominal distention and abdominal pain.   Genitourinary:  Positive for frequency. Negative for dysuria and flank pain.   Neurological:  Positive for weakness.   All other systems reviewed and are negative.  Objective:     Vital Signs (Most Recent):  Temp: 98.2 °F (36.8 °C) (09/11/22 0752)  Pulse: 80 (09/11/22 0835)  Resp: 18 (09/11/22 0752)  BP: 113/62 (09/11/22 0752)  SpO2: 97 % (09/11/22 0752) Vital Signs (24h Range):  Temp:  [97.4 °F (36.3 °C)-98.8 °F (37.1 °C)] 98.2 °F (36.8 °C)  Pulse:  [] 80  Resp:  [16-20] 18  SpO2:  [94 %-99 %] 97 %  BP: (113-130)/(59-76) 113/62     Weight: 102.1 kg (225 lb)  Body mass index is 38.62 kg/m².    Estimated Creatinine Clearance: 89.2 mL/min (based on SCr of 0.8 mg/dL).    Physical Exam  Vitals and nursing note reviewed.   Constitutional:       Appearance: Normal appearance.   HENT:      Head: Normocephalic and  atraumatic.   Eyes:      Extraocular Movements: Extraocular movements intact.      Conjunctiva/sclera: Conjunctivae normal.      Pupils: Pupils are equal, round, and reactive to light.   Cardiovascular:      Rate and Rhythm: Normal rate and regular rhythm.      Pulses: Normal pulses.      Heart sounds: Normal heart sounds.   Pulmonary:      Effort: Pulmonary effort is normal.      Breath sounds: Normal breath sounds.   Abdominal:      General: Abdomen is flat. Bowel sounds are normal.      Palpations: Abdomen is soft.      Tenderness: There is no right CVA tenderness or left CVA tenderness.   Musculoskeletal:         General: Normal range of motion.      Right lower leg: No edema.      Left lower leg: No edema.   Skin:     General: Skin is warm and dry.      Findings: No erythema.   Neurological:      Mental Status: She is alert.       Significant Labs: Blood Culture:   Recent Labs   Lab 09/04/22  1532 09/04/22  1552 09/05/22  1234 09/10/22  2219 09/10/22  2220   LABBLOO Gram stain aer bottle: Gram negative rods  Gram stain alta bottle: Gram negative rods  Results called to and read back by: Adenike Ross RN 09/05/2022  07:02  KLEBSIELLA PNEUMONIAE* Gram stain aer bottle: Gram negative rods  Gram stain alta bottle: Gram negative rods  Results called to and read back by: Adenike Ross RN 09/05/2022  07:02  KLEBSIELLA PNEUMONIAE  For susceptibility see order #[id number]  * No growth after 5 days.  Gram stain aer bottle: Gram negative rods  Results called to and read back by: Los Kwong, Charge Nurse 09/10/2022  20:05  No growth after 5 days. No Growth to date No Growth to date     CBC:   Recent Labs   Lab 09/10/22  2220   WBC 15.10*   HGB 11.1*   HCT 31.6*        CMP:   Recent Labs   Lab 09/10/22  2228   *   K 4.1      CO2 20*      BUN 20   CREATININE 0.8   CALCIUM 8.8   PROT 6.4   ALBUMIN 2.3*   BILITOT 0.4   ALKPHOS 111   AST 39   ALT 47*   ANIONGAP 9     Urine Culture:   Recent  Labs   Lab 09/04/22  1517   LABURIN KLEBSIELLA PNEUMONIAE  >100,000 cfu/ml  *     Urine Studies:   Recent Labs   Lab 09/10/22  2258   COLORU Yellow   APPEARANCEUA Hazy*   PHUR 6.0   SPECGRAV 1.020   PROTEINUA 1+*   GLUCUA Negative   KETONESU Negative   BILIRUBINUA Negative   OCCULTUA 3+*   NITRITE Negative   LEUKOCYTESUR 3+*   RBCUA >100*   WBCUA 61*   BACTERIA Many*   SQUAMEPITHEL 2   HYALINECASTS 0       Significant Imaging: CT: I have reviewed all pertinent results/findings within the past 24 hours:  Persistent asymmetrically delayed enhancement of the right kidney, with mild perinephric stranding and heterogeneous parenchymal hypoattenuation.  Consider correlation with urinalysis to exclude pyelonephritis.

## 2022-09-11 NOTE — ASSESSMENT & PLAN NOTE
BCx 9/4 positive for pan-sensitive klebsiella pneumonia (consistent w/ urinary cultures). BCx 9/5 positive for GNRs. Was previously treated with PO ciprofloxacin. Received 1 dose of IV CTX in ED.    - Repeat BCx  - Continue IV CTX  - ID consult for bacteremia

## 2022-09-11 NOTE — SUBJECTIVE & OBJECTIVE
History reviewed. No pertinent past medical history.    Past Surgical History:   Procedure Laterality Date    CYSTOSCOPY W/ URETERAL STENT PLACEMENT Right 9/4/2022    Procedure: CYSTOSCOPY, WITH URETERAL STENT INSERTION;  Surgeon: Ree Pak MD;  Location: [address];  Service: Urology;  Laterality: Right;    RETROGRADE PYELOGRAPHY Right 9/4/2022    Procedure: PYELOGRAM, RETROGRADE;  Surgeon: Ree Pak MD;  Location: [address];  Service: Urology;  Laterality: Right;       Review of patient's allergies indicates:  No Known Allergies    Family History       Problem Relation (Age of Onset)    Diabetes Mother, Father    Hypertension Mother, Father            Tobacco Use    Smoking status: Never    Smokeless tobacco: Never   Substance and Sexual Activity    Alcohol use: No    Drug use: No    Sexual activity: Yes     Partners: Male       Review of Systems   Constitutional:  Positive for activity change, fatigue and fever. Negative for chills.   Respiratory:  Positive for cough. Negative for shortness of breath.    Cardiovascular:  Negative for chest pain.   Gastrointestinal:  Negative for abdominal pain, diarrhea, nausea and vomiting.   Genitourinary:  Positive for frequency. Negative for difficulty urinating, flank pain, hematuria and urgency.   Neurological:  Positive for weakness. Negative for dizziness.     Objective:     Temp:  [97.4 °F (36.3 °C)-98.8 °F (37.1 °C)] 98.2 °F (36.8 °C)  Pulse:  [] 80  Resp:  [16-20] 18  SpO2:  [94 %-99 %] 97 %  BP: (113-130)/(59-76) 113/62     Body mass index is 38.62 kg/m².           Drains       None                   Physical Exam  Vitals reviewed.   Constitutional:       General: She is not in acute distress.     Appearance: She is well-developed. She is not diaphoretic.   HENT:      Head: Normocephalic and atraumatic.   Eyes:      General: No scleral icterus.  Cardiovascular:      Rate and Rhythm: Normal rate.   Pulmonary:      Effort: Pulmonary  effort is normal. No respiratory distress.      Breath sounds: No stridor.   Abdominal:      General: There is no distension.      Palpations: Abdomen is soft.      Tenderness: There is no abdominal tenderness. There is no right CVA tenderness or left CVA tenderness.   Genitourinary:     Comments: Ureteral stent not visible at meatus  Musculoskeletal:         General: Normal range of motion.      Cervical back: Normal range of motion.   Skin:     General: Skin is warm and dry.   Neurological:      Mental Status: She is alert.   Psychiatric:         Behavior: Behavior normal.       Significant Labs:    BMP:  Recent Labs   Lab 09/06/22  0226 09/07/22  0349 09/10/22  2228   * 139 135*   K 4.1 4.0 4.1    108 106   CO2 23 22* 20*   BUN 18 13 20   CREATININE 0.8 0.7 0.8   CALCIUM 8.6* 8.7 8.8       CBC:  Recent Labs   Lab 09/06/22  0226 09/07/22  0349 09/10/22  2220   WBC 10.47 9.92 15.10*   HGB 11.3* 10.4* 11.1*   HCT 33.9* 30.8* 31.6*   PLT 88* 90* 207       Blood Culture:   Recent Labs   Lab 09/05/22  1234 09/10/22  2219 09/10/22  2220   LABBLOO No growth after 5 days.  Gram stain aer bottle: Gram negative rods  Results called to and read back by: Los Kwong, Charge Nurse 09/10/2022  20:05  No growth after 5 days. No Growth to date No Growth to date     Urine Culture:   Recent Labs   Lab 09/04/22  1517   LABURIN KLEBSIELLA PNEUMONIAE  >100,000 cfu/ml  *     Urine Studies:   Recent Labs   Lab 09/04/22  1517 09/10/22  2258   COLORU Yellow Yellow   APPEARANCEUA Hazy* Hazy*   PHUR 6.0 6.0   SPECGRAV >1.030* 1.020   PROTEINUA 2+* 1+*   GLUCUA Negative Negative   KETONESU Trace* Negative   BILIRUBINUA Negative Negative   OCCULTUA 3+* 3+*   NITRITE Negative Negative   LEUKOCYTESUR 3+* 3+*   RBCUA 24* >100*   WBCUA >100* 61*   BACTERIA Many* Many*   SQUAMEPITHEL 2 2   HYALINECASTS 0 0       Significant Imaging:  Findings as above

## 2022-09-11 NOTE — ASSESSMENT & PLAN NOTE
Albumin 2.3 on admission. Pt. Reports poor appetite since 9/5 which would be unlikely to explain low albumin over short time period.    - Nutrition consult

## 2022-09-11 NOTE — ED TRIAGE NOTES
Yenifer Chatterjee, an 56 y.o. female presents to the ED from home.  C/o cough and SOB. PCP called her to come to ED for + blood cultures. Had stent placed last Sunday for kidney stones and recent kidney and bladder infections.      Chief Complaint   Patient presents with    Blood Infection     Pt told to come back to ER for +blood cultures. Had cystoscopy on 9/4. Pt endorses SOB & cough since last Friday and subjective fever yesterday.      Review of patient's allergies indicates:  No Known Allergies  No past medical history on file.

## 2022-09-11 NOTE — ASSESSMENT & PLAN NOTE
57 yo female with pyelonephritis, nephrolithiasis s/p right ureteral stent who presents with positive urinalysis, elevated WBC. She was treated for Klebsiella bacteremia, but returned after blood cultures from 9/5 grew bacteria on 9/10. She has blood and urine cultures pending and is on ceftriaxone. She is afebrile and asymptomatic. While her positive blood culture does not represent treatment failure, and her symptoms do not suggest clinical failure, her elevated WBC is concerning. CT does not suggest source control as a concern at this time.      Recommenations:   Continue ceftriaxone for now.   Await repeat blood and urine cultures   Follow WBC.

## 2022-09-11 NOTE — NURSING
Admission: Patient arrived to the unit via stretcher, one attendant and spouse. Patient is able to transfer without assistance from stretcher to assigned bed. Skin is intact and checked by two RN's. Patient is alert and oriented x4. Continent with incontinent episodes. No complaints of pain or discomfort at the time of this assessment. Patient and spouse oriented to the room and unit. Will continue to monitor progress.   225lbs  5'4    Nurses Note -- 4 Eyes      9/11/2022   6:47 AM      Skin assessed during: Admit      [x] No Pressure Injuries Present    []Prevention Measures Documented      [] Yes- Altered Skin Integrity Present or Discovered   [] LDA Added if Not in Epic (Describe Wound)   [] New Altered Skin Integrity was Present on Admit and Documented in LDA   [] Wound Image Taken    Wound Care Consulted? No    Attending Nurse:  Nicole Estrada RN     Second RN/Staff Member: SONY Frey

## 2022-09-11 NOTE — ED PROVIDER NOTES
Signout Note  I received signout from the previous providers.     Chief complaint:  Blood Infection (Pt told to come back to ER for +blood cultures. Had cystoscopy on 9/4. Pt endorses SOB & cough since last Friday and subjective fever yesterday. )    Pertinent History, ED course, Disposition:    Per sign out and chart review: Yenifer Chatterjee is a 56 y.o. female who presented to emergency department with complaint of positive blood cultures for Klebsiella that were noted to be pansensitive.  She has been on Cipro 500 b.i.d. at home.  s/p right retrograde pyelography and cystoscopy w/ ureteral stent placement on 9/4/2022    Pt signed out to me pending:  CT labs, imaging with planned admission.    Update/ Disposition:  Blood cultures were drawn.  Patient was given Rocephin a L fluids.  Lactic acid within normal limits.  Urinalysis consistent with UTI.  COVID negative.  Creatinine near baseline.  Noted leukocytosis.  Chest x-ray with no focal findings.  CT abdomen pelvis with contrast obtained due to concern stent displacement, hydronephrosis.  CT abdomen and pelvis returned showing resolution of her right hydronephrosis, redemonstrated that her stent was in the correct place and shows her 7mm R. nephrolith.  I called discussed the case urology who was agreeable with plan recommends admission to Medicine.  They state they will see the patient in the morning.  I called and discussed the case with Hospital Medicine who was agreeable with admission.  Patient updated on plans for admission.  Patient deemed stable for admission to hospital medicine.    Patient,caregiver and/or family understands the plan and verbalized agreement. All questions answered.     Diagnostic Impression:    1. Pyelonephritis    2. Weakness    3. Urinary tract infection without hematuria, site unspecified    4. UTI (urinary tract infection)    5. Nephrolith         ED Disposition Condition    Admit                    ED Course as of 09/11/22 0404   Sat  Sep 10, 2022   2158 Patient referred by urology and due to bacteremia.  Patient is on culture appropriate ciprofloxacin for her positive blood culture for Klebsiella.  However, the patient reported generalized fatigue, weakness, shortness of breath, and cough.  Therefore, will treat patient with Rocephin.  Will perform sepsis workup.  Will provide IV fluids given tachycardia.  Will obtain chest x-ray given shortness of breath and cough.  On upper right broad-spectrum antibiotic coverage given positive culture results.  However, will broaden coverage if lactic acid is elevated, patient develops hypotension or another source of infection is found. [DS]      ED Course User Index  [DS] Raymond Carmona MD         Vitals:    09/11/22 0152   BP: 127/64   Pulse: 84   Resp: 16   Temp: 98.2 °F (36.8 °C)       Labs Reviewed   CBC W/ AUTO DIFFERENTIAL - Abnormal; Notable for the following components:       Result Value    WBC 15.10 (*)     RBC 3.35 (*)     Hemoglobin 11.1 (*)     Hematocrit 31.6 (*)     MCH 33.1 (*)     Lymph % 14.0 (*)     All other components within normal limits   COMPREHENSIVE METABOLIC PANEL - Abnormal; Notable for the following components:    Sodium 135 (*)     CO2 20 (*)     Albumin 2.3 (*)     ALT 47 (*)     All other components within normal limits   URINALYSIS, REFLEX TO URINE CULTURE - Abnormal; Notable for the following components:    Appearance, UA Hazy (*)     Protein, UA 1+ (*)     Occult Blood UA 3+ (*)     Leukocytes, UA 3+ (*)     All other components within normal limits    Narrative:     Specimen Source->Urine   URINALYSIS MICROSCOPIC - Abnormal; Notable for the following components:    RBC, UA >100 (*)     WBC, UA 61 (*)     Bacteria Many (*)     All other components within normal limits    Narrative:     Specimen Source->Urine   CULTURE, BLOOD   CULTURE, BLOOD   CULTURE, URINE   LACTIC ACID, PLASMA   SARS-COV-2 RNA AMPLIFICATION, QUAL   LACTIC ACID, PLASMA       Imaging Studies:    CT  Abdomen Pelvis With Contrast   Final Result      1. Right ureteral stent with interval resolution of right hydronephrosis.   2. Persistent asymmetrically delayed enhancement of the right kidney, with mild perinephric stranding and heterogeneous parenchymal hypoattenuation.  Consider correlation with urinalysis to exclude pyelonephritis.   3. 7 mm right lower pole nonobstructing nephrolith.   4. Few colonic diverticula without evidence of acute diverticulitis.   5.  Additional findings as above.      Electronically signed by resident: Cem Branch   Date:    09/11/2022   Time:    02:05      Electronically signed by: Ben Hamilton MD   Date:    09/11/2022   Time:    02:50      X-Ray Chest AP Portable   Final Result      No acute abnormality.         Electronically signed by: Preston Perez   Date:    09/10/2022   Time:    22:40          Medications Given:  Medications   sodium chloride 0.9% bolus 1,000 mL (0 mLs Intravenous Stopped 9/11/22 0040)   cefTRIAXone (ROCEPHIN) 1 g/50 mL D5W IVPB (0 g Intravenous Stopped 9/11/22 0033)   iohexoL (OMNIPAQUE 350) injection 100 mL (100 mLs Intravenous Given 9/11/22 0133)                Please note that this dictation was completed with computer voice recognition software.  Quite often unanticipated grammatical, syntax, homophones, and other interpretive errors are inadvertently transcribed by the computer software.  Please disregard these errors.  Please excuse any errors that have escaped final proofreading.         Ranjit Atwood MD  Resident  09/11/22 0356       Jessica Love MD  09/11/22 0404

## 2022-09-12 ENCOUNTER — TELEPHONE (OUTPATIENT)
Dept: UROLOGY | Facility: CLINIC | Age: 57
End: 2022-09-12
Payer: COMMERCIAL

## 2022-09-12 DIAGNOSIS — N20.1 RIGHT URETERAL CALCULUS: Primary | ICD-10-CM

## 2022-09-12 LAB
ALBUMIN SERPL BCP-MCNC: 2.4 G/DL (ref 3.5–5.2)
ALP SERPL-CCNC: 91 U/L (ref 55–135)
ALT SERPL W/O P-5'-P-CCNC: 39 U/L (ref 10–44)
ANION GAP SERPL CALC-SCNC: 8 MMOL/L (ref 8–16)
ANISOCYTOSIS BLD QL SMEAR: SLIGHT
AST SERPL-CCNC: 27 U/L (ref 10–40)
BACTERIA BLD CULT: ABNORMAL
BACTERIA UR CULT: NO GROWTH
BACTERIA UR CULT: NO GROWTH
BASOPHILS # BLD AUTO: ABNORMAL K/UL (ref 0–0.2)
BASOPHILS NFR BLD: 0 % (ref 0–1.9)
BILIRUB SERPL-MCNC: 0.4 MG/DL (ref 0.1–1)
BUN SERPL-MCNC: 13 MG/DL (ref 6–20)
CALCIUM SERPL-MCNC: 8.6 MG/DL (ref 8.7–10.5)
CHLORIDE SERPL-SCNC: 108 MMOL/L (ref 95–110)
CO2 SERPL-SCNC: 21 MMOL/L (ref 23–29)
CREAT SERPL-MCNC: 0.7 MG/DL (ref 0.5–1.4)
DIFFERENTIAL METHOD: ABNORMAL
EOSINOPHIL # BLD AUTO: ABNORMAL K/UL (ref 0–0.5)
EOSINOPHIL NFR BLD: 0 % (ref 0–8)
ERYTHROCYTE [DISTWIDTH] IN BLOOD BY AUTOMATED COUNT: 14.3 % (ref 11.5–14.5)
EST. GFR  (NO RACE VARIABLE): >60 ML/MIN/1.73 M^2
GLUCOSE SERPL-MCNC: 101 MG/DL (ref 70–110)
HCT VFR BLD AUTO: 32.6 % (ref 37–48.5)
HGB BLD-MCNC: 11 G/DL (ref 12–16)
HYPOCHROMIA BLD QL SMEAR: ABNORMAL
IMM GRANULOCYTES # BLD AUTO: ABNORMAL K/UL (ref 0–0.04)
IMM GRANULOCYTES NFR BLD AUTO: ABNORMAL % (ref 0–0.5)
LYMPHOCYTES # BLD AUTO: ABNORMAL K/UL (ref 1–4.8)
LYMPHOCYTES NFR BLD: 9 % (ref 18–48)
MCH RBC QN AUTO: 31.2 PG (ref 27–31)
MCHC RBC AUTO-ENTMCNC: 33.7 G/DL (ref 32–36)
MCV RBC AUTO: 92 FL (ref 82–98)
MONOCYTES # BLD AUTO: ABNORMAL K/UL (ref 0.3–1)
MONOCYTES NFR BLD: 2 % (ref 4–15)
MYELOCYTES NFR BLD MANUAL: 3 %
NEUTROPHILS NFR BLD: 83 % (ref 38–73)
NEUTS BAND NFR BLD MANUAL: 3 %
NRBC BLD-RTO: 0 /100 WBC
OVALOCYTES BLD QL SMEAR: ABNORMAL
PLATELET # BLD AUTO: 266 K/UL (ref 150–450)
PMV BLD AUTO: 9.9 FL (ref 9.2–12.9)
POIKILOCYTOSIS BLD QL SMEAR: SLIGHT
POLYCHROMASIA BLD QL SMEAR: ABNORMAL
POTASSIUM SERPL-SCNC: 4.1 MMOL/L (ref 3.5–5.1)
PROT SERPL-MCNC: 6.4 G/DL (ref 6–8.4)
RBC # BLD AUTO: 3.53 M/UL (ref 4–5.4)
SODIUM SERPL-SCNC: 137 MMOL/L (ref 136–145)
TOXIC GRANULES BLD QL SMEAR: PRESENT
WBC # BLD AUTO: 12.86 K/UL (ref 3.9–12.7)

## 2022-09-12 PROCEDURE — 85027 COMPLETE CBC AUTOMATED: CPT

## 2022-09-12 PROCEDURE — 63600175 PHARM REV CODE 636 W HCPCS

## 2022-09-12 PROCEDURE — 99232 SBSQ HOSP IP/OBS MODERATE 35: CPT | Mod: ,,, | Performed by: INTERNAL MEDICINE

## 2022-09-12 PROCEDURE — 99233 PR SUBSEQUENT HOSPITAL CARE,LEVL III: ICD-10-PCS | Mod: ,,, | Performed by: INTERNAL MEDICINE

## 2022-09-12 PROCEDURE — 11000001 HC ACUTE MED/SURG PRIVATE ROOM

## 2022-09-12 PROCEDURE — 36415 COLL VENOUS BLD VENIPUNCTURE: CPT

## 2022-09-12 PROCEDURE — 80053 COMPREHEN METABOLIC PANEL: CPT

## 2022-09-12 PROCEDURE — 99233 SBSQ HOSP IP/OBS HIGH 50: CPT | Mod: ,,, | Performed by: INTERNAL MEDICINE

## 2022-09-12 PROCEDURE — 85007 BL SMEAR W/DIFF WBC COUNT: CPT

## 2022-09-12 PROCEDURE — 99232 PR SUBSEQUENT HOSPITAL CARE,LEVL II: ICD-10-PCS | Mod: ,,, | Performed by: INTERNAL MEDICINE

## 2022-09-12 RX ADMIN — ENOXAPARIN SODIUM 40 MG: 100 INJECTION SUBCUTANEOUS at 06:09

## 2022-09-12 RX ADMIN — CEFTRIAXONE 2 G: 2 INJECTION, SOLUTION INTRAVENOUS at 01:09

## 2022-09-12 NOTE — PLAN OF CARE
09/12/22 0806   Post-Acute Status   Post-Acute Authorization Home Health;IV Infusion   Home Health Status Referrals Sent   IV Infusion Status Referral(s) sent   Discharge Plan   Discharge Plan A Home with family;Home Health   Discharge Plan B Home with family;Home Health         Referrals sent to [facility] and [facility]  to follow for IVAB at home.

## 2022-09-12 NOTE — PROGRESS NOTES
[facility]  Urology  Progress Note    Patient Name: Yenifer Chatterjee  MRN: [id number]  Admission Date: 9/10/2022  Hospital Length of Stay: 1 days  Code Status: Full Code   Attending Provider: Jennifer Owen MD   Primary Care Physician: Primary Doctor No    Subjective:     HPI:  Yenifer Chatterjee is a 56 year old female with a history of bacteremia and ureteral stone s/p right ureteral stent on 9/4/22.     Patient was discharged on 9/7/22 with two weeks of culture appropriate ciprofloxacin, repeat blood cultures were negative at the time of discharge. Urology was notified of a positive result in 1/2 blood cultures last night and the patient was advised to return to the ED for assessment. She states that she has been very fatigued at home, and had subjective fevers as recently as Friday.     On assessment, patient is AF, HDS though mildly tachycardic on initial presentation. Leukocytosis to 15, Cr 0.8 (baseline 0.8). UA LE positive, nitrite negative. Repeat CT shows prior R ureteral stone now in the right renal pelvis, ureteral stent in place with the distal coil in the proximal urethra.       Interval History: AF. VSS. Adequate UOP.     Objective:     Temp:  [97.4 °F (36.3 °C)-98.6 °F (37 °C)] 98.5 °F (36.9 °C)  Pulse:  [80-99] 82  Resp:  [16-20] 16  SpO2:  [94 %-98 %] 95 %  BP: (102-130)/(56-72) 128/56     Body mass index is 38.62 kg/m².           Drains       None                   Physical Exam  Vitals reviewed.   Constitutional:       General: She is not in acute distress.     Appearance: She is well-developed. She is not diaphoretic.   HENT:      Head: Normocephalic and atraumatic.   Eyes:      General: No scleral icterus.  Cardiovascular:      Rate and Rhythm: Normal rate.   Pulmonary:      Effort: Pulmonary effort is normal. No respiratory distress.      Breath sounds: No stridor.   Abdominal:      General: There is no distension.      Palpations: Abdomen is soft.      Tenderness: There is no abdominal  tenderness. There is no right CVA tenderness or left CVA tenderness.   Genitourinary:     Comments: Ureteral stent not visible at meatus  Musculoskeletal:         General: Normal range of motion.      Cervical back: Normal range of motion.   Skin:     General: Skin is warm and dry.   Neurological:      Mental Status: She is alert.   Psychiatric:         Behavior: Behavior normal.       Significant Labs:    BMP:  Recent Labs   Lab 09/06/22  0226 09/07/22  0349 09/10/22  2228   * 139 135*   K 4.1 4.0 4.1    108 106   CO2 23 22* 20*   BUN 18 13 20   CREATININE 0.8 0.7 0.8   CALCIUM 8.6* 8.7 8.8       CBC:   Recent Labs   Lab 09/06/22  0226 09/07/22  0349 09/10/22  2220   WBC 10.47 9.92 15.10*   HGB 11.3* 10.4* 11.1*   HCT 33.9* 30.8* 31.6*   PLT 88* 90* 207       All pertinent labs results from the past 24 hours have been reviewed.    Significant Imaging:  All pertinent imaging results/findings from the past 24 hours have been reviewed.                    Assessment/Plan:     Right ureteral calculus  Yenifer Chatterjee is a 56 year old female with a history of bacteremia and ureteral stone s/p right ureteral stent on 9/4/22.   - no present indication for urologic intervention, plan to continue antibiotics through definitive stone treatment  - in and out catheterization performed, urine for culture. Results pending   - agree with IV antibiotics  - most recent blood cultures NGTD  -Will set up outpatient follow up with Dr Peacock         VTE Risk Mitigation (From admission, onward)         Ordered     enoxaparin injection 40 mg  Daily         09/11/22 0519     IP VTE HIGH RISK PATIENT  Once         09/11/22 0519     Place sequential compression device  Until discontinued         09/11/22 0519                KAREN TERAN MD  Urology  [facility] - Surgery

## 2022-09-12 NOTE — ASSESSMENT & PLAN NOTE
Right 7mm ureteral calculus s/p ureteral stent 9/4. No flank pain on exam. Hematuria resolving.    - Urology consulted- recommends outpatient Uro f/u with Dr. Peacock for management

## 2022-09-12 NOTE — SUBJECTIVE & OBJECTIVE
Interval History: NAEO. Patient denies fever, chills, n/v. States she feels much better today    Review of Systems   Constitutional:  Negative for chills, fatigue and fever.   HENT:  Negative for congestion.    Eyes:  Negative for visual disturbance.   Respiratory:  Negative for cough and shortness of breath.    Cardiovascular:  Negative for chest pain and leg swelling.   Gastrointestinal:  Negative for abdominal pain, diarrhea, nausea and vomiting.   Endocrine: Positive for polyuria.        2/2 Klebsiella UTI   Genitourinary:  Positive for urgency.   Musculoskeletal:  Negative for arthralgias.   Skin:  Negative for pallor.   Neurological:  Negative for dizziness and headaches.   Psychiatric/Behavioral:  Negative for behavioral problems. The patient is not nervous/anxious.    Objective:     Vital Signs (Most Recent):  Temp: 97.9 °F (36.6 °C) (09/12/22 1132)  Pulse: 80 (09/12/22 1132)  Resp: 18 (09/12/22 1132)  BP: (!) 103/58 (09/12/22 1132)  SpO2: (!) 93 % (09/12/22 1132)   Vital Signs (24h Range):  Temp:  [97.9 °F (36.6 °C)-98.6 °F (37 °C)] 97.9 °F (36.6 °C)  Pulse:  [80-88] 80  Resp:  [16-18] 18  SpO2:  [93 %-98 %] 93 %  BP: (102-128)/(56-67) 103/58     Weight: 102.1 kg (225 lb)  Body mass index is 38.62 kg/m².  No intake or output data in the 24 hours ending 09/12/22 1514   Physical Exam  Constitutional:       General: She is not in acute distress.     Appearance: Normal appearance. She is normal weight.   HENT:      Head: Normocephalic and atraumatic.      Right Ear: External ear normal.      Left Ear: External ear normal.      Nose: Nose normal.      Mouth/Throat:      Mouth: Mucous membranes are moist.      Pharynx: Oropharynx is clear.   Eyes:      Extraocular Movements: Extraocular movements intact.      Conjunctiva/sclera: Conjunctivae normal.   Cardiovascular:      Rate and Rhythm: Normal rate and regular rhythm.      Heart sounds: Normal heart sounds.   Pulmonary:      Effort: Pulmonary effort is normal.       Breath sounds: Normal breath sounds.   Abdominal:      General: Abdomen is flat. Bowel sounds are normal.      Palpations: Abdomen is soft.   Musculoskeletal:      Cervical back: Neck supple.      Right lower leg: No edema.      Left lower leg: No edema.   Skin:     General: Skin is warm and dry.      Capillary Refill: Capillary refill takes less than 2 seconds.   Neurological:      General: No focal deficit present.      Mental Status: She is alert and oriented to person, place, and time. Mental status is at baseline.   Psychiatric:         Mood and Affect: Mood normal.         Behavior: Behavior normal.       Significant Labs: All pertinent labs within the past 24 hours have been reviewed.    Significant Imaging: I have reviewed all pertinent imaging results/findings within the past 24 hours.

## 2022-09-12 NOTE — SUBJECTIVE & OBJECTIVE
Interval History: Patient seen at bedside. Asking for clarification of what happened regarding infection and kidney stone. Not clear which may have occurred first. Patient mentions taking ciprofloxacin with milk at home which rendered it ineffective. So less likely this was scenario of treatment failure. Blood cx NGTD since 9/10.     Review of Systems   Constitutional:  Positive for appetite change and fatigue. Negative for chills, fever and unexpected weight change.   HENT:  Negative for congestion, postnasal drip and trouble swallowing.    Respiratory:  Negative for cough, chest tightness and shortness of breath.    Cardiovascular:  Negative for chest pain, palpitations and leg swelling.   Gastrointestinal:  Negative for abdominal pain, blood in stool, constipation, diarrhea, nausea and vomiting.   Genitourinary:  Negative for difficulty urinating, dysuria and hematuria.   Musculoskeletal:  Positive for back pain. Negative for arthralgias.   Neurological:  Negative for dizziness and light-headedness.   Psychiatric/Behavioral:  Negative for confusion.    Objective:     Vital Signs (Most Recent):  Temp: 98.3 °F (36.8 °C) (09/12/22 1624)  Pulse: 90 (09/12/22 1624)  Resp: 18 (09/12/22 1624)  BP: 131/77 (09/12/22 1624)  SpO2: (!) 92 % (09/12/22 1624)   Vital Signs (24h Range):  Temp:  [97.9 °F (36.6 °C)-98.6 °F (37 °C)] 98.3 °F (36.8 °C)  Pulse:  [80-90] 90  Resp:  [16-18] 18  SpO2:  [92 %-96 %] 92 %  BP: (102-131)/(56-77) 131/77     Weight: 102.1 kg (225 lb)  Body mass index is 38.62 kg/m².    Estimated Creatinine Clearance: 101.9 mL/min (based on SCr of 0.7 mg/dL).    Physical Exam  Constitutional:       General: She is not in acute distress.     Appearance: Normal appearance. She is not ill-appearing.   Cardiovascular:      Rate and Rhythm: Normal rate and regular rhythm.      Pulses: Normal pulses.      Heart sounds: Normal heart sounds. No murmur heard.    No friction rub. No gallop.   Pulmonary:      Effort:  Pulmonary effort is normal. No respiratory distress.      Breath sounds: Normal breath sounds.   Abdominal:      General: Abdomen is flat. Bowel sounds are normal. There is no distension.      Palpations: Abdomen is soft.      Tenderness: There is no abdominal tenderness.   Skin:     General: Skin is warm and dry.   Neurological:      Mental Status: She is alert.       Significant Labs: All pertinent labs within the past 24 hours have been reviewed.    Significant Imaging: I have reviewed all pertinent imaging results/findings within the past 24 hours.

## 2022-09-12 NOTE — ASSESSMENT & PLAN NOTE
Albumin 2.3 on admission. Pt. Reports poor appetite since 9/5 which would be unlikely to explain low albumin over short time period.    - Nutrition consulted; Boost plus BID added

## 2022-09-12 NOTE — PLAN OF CARE
[facility]  Initial Discharge Assessment       Primary Care Provider: Primary Doctor No    Admission Diagnosis: UTI (urinary tract infection) [N39.0]  Weakness [R53.1]  Pyelonephritis [N12]  Nephrolith [N20.0]  Chest pain [R07.9]  Urinary tract infection without hematuria, site unspecified [N39.0]    Admission Date: 9/10/2022  Expected Discharge Date: 9/13/2022         Payor: AETNA / Plan: AET[employer] CHOICE POS / Product Type: Commercial /     Extended Emergency Contact Information  Primary Emergency Contact: Remy Hurtado  Address: [address] [place] [address] [id number] [place] [address] [facility]  Home Phone: [phone]  Mobile Phone: [phone]  Relation: Significant other    Discharge Plan A: Home with family, Home Health  Discharge Plan B: Home with family, Home Health    No Pharmacies Listed    Initial Assessment (most recent)       Adult Discharge Assessment - 09/12/22 1409          Discharge Assessment    Assessment Type Discharge Planning Assessment     Confirmed/corrected address, phone number and insurance Yes     Confirmed Demographics Correct on Facesheet     Source of Information patient     Does patient/caregiver understand observation status Yes     Communicated ROCIO with patient/caregiver Yes     Lives With spouse     Do you expect to return to your current living situation? Yes     Do you have help at home or someone to help you manage your care at home? Yes     Who are your caregiver(s) and their phone number(s)? Remy Hurtado (Spouse) [phone]     Prior to hospitilization cognitive status: Alert/Oriented     Current cognitive status: Alert/Oriented     Walking or Climbing Stairs Difficulty none     Dressing/Bathing Difficulty none     Equipment Currently Used at Home none     Readmission within 30 days? No     Patient currently being followed by outpatient case management? No     Do you currently have service(s) that help you manage your care at home? No     Do you take  prescription medications? Yes     Do you have prescription coverage? Yes     Do you have any problems affording any of your prescribed medications? No     Is the patient taking medications as prescribed? yes     Who is going to help you get home at discharge? Significant other-Remy     How do you get to doctors appointments? family or friend will provide     Are you on dialysis? No     Do you take coumadin? No     Discharge Plan A Home with family;Home Health     Discharge Plan B Home with family;Home Health                   Spoke with patient at bedside to complete d/c planning assessment. Patient lives with her spouse in a two-story condo with 12 steps to access 2nd floor bedroom. Patient is Independent with ADL'S. No DME in home. Verified PCP, Pharmacy and health insurance. [facility] plus following for anticipated IVAB at discharge. Will have help from spouse at home. Will continue to follow for needs.

## 2022-09-12 NOTE — HOSPITAL COURSE
Ms. Chatterjee is a 55 yo F admitted s/p retrograde pyelography and uretal stent placement. She was referred from Urology due to +Klebsiella BC also noted with 6mm uretal stone. Per ID and Urology, will continue IV antibiotics into outpatient setting. Leukocytosis improving. On morning of 9/14 patient had bright red urine per purwick with lower abd cramping; per urology this is normal with stent placement, will CTM. Patient is otherwise stable for discharge pending update of blood culture collected on 9/10.

## 2022-09-12 NOTE — ASSESSMENT & PLAN NOTE
BCx 9/4 positive for pan-sensitive klebsiella pneumonia (consistent w/ urinary cultures). BCx 9/5 positive for GNRs. Was previously treated with PO ciprofloxacin. Received 1 dose of IV CTX in ED. BC NGTD; UC NG    - Continue IV CTX, per ID  - continue to follow ID recs

## 2022-09-12 NOTE — CONSULTS
"  [facility]  Adult Nutrition  Consult Note    SUMMARY     Recommendations    1. Continue Regular diet      2. Add Boost Plus BID to optimize nutrient intake      3. RD to monitor and follow    Goals: Meet % EEN, EPN by RD f/u  Nutrition Goal Status: new  Communication of RD Recs:  (POC)    Assessment and Plan    Nutrition Problem  Inadequate nutrient intake    Related to (etiology):   Inadequate oral intake    Signs and Symptoms (as evidenced by):   Pt reports decreased appetite in the past week     Interventions/Recommendations (treatment strategy):  Collaboration with other providers  ONS    Nutrition Diagnosis Status:   New     Reason for Assessment    Reason For Assessment: consult  Diagnosis:  (Positive blood culture)  Relevant Medical History: ureterel calculus  Interdisciplinary Rounds: did not attend    General Information Comments:   RD consulted for hypoalbuminemia. Pt reports decreased appetite in the past week due to loss of taste. Usually eat 2 meals per day before appetite changes. Pt tolerating ~25% of meals. Having some heart burn in the evening even with little PO intake which is new to her. Denies N/V, chewing/swallowing difficulties. Per wt hx, pt lost 10 lb (4%) x 8 month, which is not significant. Pt denies any wt changes recently. Pt appears nourished, NFPE not warranted. No indicator of malnutrition. Albumin and prealbumin level should not be use as an indicator for malnutrition as it do not change in response to changes in nutrient intake and could be cause by other factors.     Nutrition Discharge Planning: Adequate PO intake.     Nutrition Risk Screen    Nutrition Risk Screen: no indicators present    Anthropometrics    Temp: 97.9 °F (36.6 °C)  Height Method: Stated  Height: 5' 4" (162.6 cm)  Height (inches): 64 in  Weight Method: Stated  Weight: 102.1 kg (225 lb)  Weight (lb): 225 lb  Ideal Body Weight (IBW), Female: 120 lb  % Ideal Body Weight, Female (lb): 187.5 %  BMI " (Calculated): 38.6     Lab/Procedures/Meds    Pertinent Labs Reviewed: reviewed  Pertinent Medications Reviewed: reviewed    Estimated/Assessed Needs    Weight Used For Calorie Calculations: 102 kg (224 lb 13.9 oz)  Energy Calorie Requirements (kcal): 1585 kcal  Energy Need Method: Bouse-St Jeor  Protein Requirements: 81-102g (0.8-1g/kg)  Weight Used For Protein Calculations: 102 kg (224 lb 13.9 oz)  Fluid Requirements (mL): 1ml/kcal or per MD  Estimated Fluid Requirement Method: RDA Method  RDA Method (mL): 1585     Nutrition Prescription Ordered    Current Diet Order: Regular    Evaluation of Received Nutrient/Fluid Intake    I/O: + 8.5L since admit  Energy Calories Required: not meeting needs  Protein Required: not meeting needs  Comments: LBM 9/10  Tolerance: tolerating    Nutrition Risk    Level of Risk/Frequency of Follow-up: low     Monitor and Evaluation    Food and Nutrient Intake: energy intake, food and beverage intake  Food and Nutrient Adminstration: diet order  Knowledge/Beliefs/Attitudes: food and nutrition knowledge/skill, beliefs and attitudes  Physical Activity and Function: nutrition-related ADLs and IADLs  Anthropometric Measurements: height/length, weight, body mass index  Biochemical Data, Medical Tests and Procedures: electrolyte and renal panel, gastrointestinal profile, glucose/endocrine profile, inflammatory profile, lipid profile  Nutrition-Focused Physical Findings: overall appearance     Nutrition Follow-Up    RD Follow-up?: Yes    Terry KEENE-DENNIS

## 2022-09-12 NOTE — TELEPHONE ENCOUNTER
Spoke to patient who states she has had a BM today.  Instructed to call back with any further needs or questions.

## 2022-09-12 NOTE — ASSESSMENT & PLAN NOTE
Yenifer Chatterjee is a 56 year old female with a history of bacteremia and ureteral stone s/p right ureteral stent on 9/4/22.   - no present indication for urologic intervention, plan to continue antibiotics through definitive stone treatment  - in and out catheterization performed, urine for culture. Results pending   - agree with IV antibiotics  - most recent blood cultures NGTD  -Will set up outpatient follow up with Dr Peacock

## 2022-09-12 NOTE — ASSESSMENT & PLAN NOTE
55 yo female with pyelonephritis, nephrolithiasis s/p right ureteral stent who presents with leukocytosis, left flank pain, and both urine and blood cultures growing multiple Klebsiella species. She was treated for Klebsiella bacteremia last week but returned after blood cultures from 9/5 grew Klebsiella variicola on 9/10. She is afebrile and asymptomatic with improving white count on ceftriaxone. Patient reports taking ciprofloxacin with milk at home with rendered the antibiotic ineffective. Education regarding how to take ciprofloxacin was provided at the bedside. Blood cultures NGTD since 9/10. Repeat set 9/11 also NGTD.     Recommendations:  --As Klebsiella took a number of days to grow, recommend waiting until 9/10 blood cultures finalize no growth to conclude that bacteremia has resolved; discharge per primary team   --Collect 2 new sets of blood cultures today that can be monitored outpatient   --Continue IV ceftriaxone while inpatient   --At discharge, switch to PO ciprofloxacin 750 mg BID to complete total of 14 days from date of blood culture clearance; anticipated stop date 9/24/22  --Ureteral stent likely to remain for a few weeks but no indication to continue antibiotics for that long; patient can follow up with urology at scheduled appointment  --Follow up in ID clinic within 1-2 weeks after discharge

## 2022-09-12 NOTE — SUBJECTIVE & OBJECTIVE
Interval History: AF. VSS. Adequate UOP.     Objective:     Temp:  [97.4 °F (36.3 °C)-98.6 °F (37 °C)] 98.5 °F (36.9 °C)  Pulse:  [80-99] 82  Resp:  [16-20] 16  SpO2:  [94 %-98 %] 95 %  BP: (102-130)/(56-72) 128/56     Body mass index is 38.62 kg/m².           Drains       None                   Physical Exam  Vitals reviewed.   Constitutional:       General: She is not in acute distress.     Appearance: She is well-developed. She is not diaphoretic.   HENT:      Head: Normocephalic and atraumatic.   Eyes:      General: No scleral icterus.  Cardiovascular:      Rate and Rhythm: Normal rate.   Pulmonary:      Effort: Pulmonary effort is normal. No respiratory distress.      Breath sounds: No stridor.   Abdominal:      General: There is no distension.      Palpations: Abdomen is soft.      Tenderness: There is no abdominal tenderness. There is no right CVA tenderness or left CVA tenderness.   Genitourinary:     Comments: Ureteral stent not visible at meatus  Musculoskeletal:         General: Normal range of motion.      Cervical back: Normal range of motion.   Skin:     General: Skin is warm and dry.   Neurological:      Mental Status: She is alert.   Psychiatric:         Behavior: Behavior normal.       Significant Labs:    BMP:  Recent Labs   Lab 09/06/22  0226 09/07/22  0349 09/10/22  2228   * 139 135*   K 4.1 4.0 4.1    108 106   CO2 23 22* 20*   BUN 18 13 20   CREATININE 0.8 0.7 0.8   CALCIUM 8.6* 8.7 8.8       CBC:   Recent Labs   Lab 09/06/22  0226 09/07/22  0349 09/10/22  2220   WBC 10.47 9.92 15.10*   HGB 11.3* 10.4* 11.1*   HCT 33.9* 30.8* 31.6*   PLT 88* 90* 207       All pertinent labs results from the past 24 hours have been reviewed.    Significant Imaging:  All pertinent imaging results/findings from the past 24 hours have been reviewed.

## 2022-09-12 NOTE — PLAN OF CARE
Recommendations    1. Continue Regular diet      2. Add Boost Plus BID to optimize nutrient intake      3. RD to monitor and follow    Goals: Meet % EEN, EPN by RD f/u  Nutrition Goal Status: new  Communication of RD Recs:  (POC)

## 2022-09-12 NOTE — PROGRESS NOTES
[facility] - Surgery  Infectious Disease  Progress Note    Patient Name: Yenifer Chatterjee  MRN: [id number]  Admission Date: 9/10/2022  Length of Stay: 1 days  Attending Physician: Jennifer Owen MD  Primary Care Provider: Primary Doctor No    Isolation Status: No active isolations  Assessment/Plan:      Pyelonephritis  57 yo female with pyelonephritis, nephrolithiasis s/p right ureteral stent who presents with leukocytosis, left flank pain, and both urine and blood cultures growing multiple Klebsiella species. She was treated for Klebsiella bacteremia last week but returned after blood cultures from 9/5 grew Klebsiella variicola on 9/10. She is afebrile and asymptomatic with improving white count on ceftriaxone. Patient reports taking ciprofloxacin with milk at home with rendered the antibiotic ineffective. Education regarding how to take ciprofloxacin was provided at the bedside. Blood cultures NGTD since 9/10. Repeat set 9/11 also NGTD.     Recommendations:  --As Klebsiella took a number of days to grow, recommend waiting until 9/10 blood cultures finalize no growth to conclude that bacteremia has resolved; discharge per primary team   --Collect 2 new sets of blood cultures today that can be monitored outpatient   --Continue IV ceftriaxone while inpatient   --At discharge, switch to PO ciprofloxacin 750 mg BID to complete total of 14 days from date of blood culture clearance; anticipated stop date 9/24/22  --Ureteral stent likely to remain for a few weeks but no indication to continue antibiotics for that long; patient can follow up with urology at scheduled appointment  --Follow up in ID clinic within 1-2 weeks after discharge       Thank you for your consult. I will sign off. Please contact us if you have any additional questions.    Raymond Gamez, DO  Infectious Disease  [facility] - Surgery    Subjective:     Principal Problem:Positive blood cultures    HPI: Ms. Chatterjee is a 56 y.o F w/ no known PMH who was initially  [place] [facility] [place] of fatigue, abdominal symptoms with subjective fevers. Imaging showed concerns for  9mm right proximal ureteral stone with mild proximal hydronephrosis and Urology placed right ureteral stent on 09/04 with improvement in symptoms. Blood cultures grew gram negative rods and patient was started on Ceftriaxone with plans for stone treatment after full two week course of culture appropriate antibiotics. Infectious Diseases was initially  consulted for GNR bacteremia with cultures growing Klebsiella and recommendations of Ciprofloxacin 750mg BID until stent removal.     She was discharged on 9/7 on oral medications. She states that she was gaining strength, and did not have fever, dysuria, or flank pain. She states that 9/10 was one of her best days since leaving the hospital. On 9/10, her blood culture result from 9/5 was amended (after being labeled as having no growth) to growing gram negative rods in one bottle. This result was called to the charge nurse, followed by the urology resident, and out of an abundance of caution, she was called back to the hospital.     She has no fever, no dysuria, and no flank pain. She does have back pain from sleeping in the hospital bed. Her WBC was elevated at 15 from 9 at discharge. Urinalysis showed leukocyte esterase and blood. CT showed persistent right sided perinephric stranding. Blood cultures were taken, and she was placed back on IV antibiotics.        Interval History: Patient seen at bedside. Asking for clarification of what happened regarding infection and kidney stone. Not clear which may have occurred first. Patient mentions taking ciprofloxacin with milk at home which rendered it ineffective. So less likely this was scenario of treatment failure. Blood cx NGTD since 9/10.     Review of Systems   Constitutional:  Positive for appetite change and fatigue. Negative for chills, fever and unexpected weight change.   HENT:  Negative for  congestion, postnasal drip and trouble swallowing.    Respiratory:  Negative for cough, chest tightness and shortness of breath.    Cardiovascular:  Negative for chest pain, palpitations and leg swelling.   Gastrointestinal:  Negative for abdominal pain, blood in stool, constipation, diarrhea, nausea and vomiting.   Genitourinary:  Negative for difficulty urinating, dysuria and hematuria.   Musculoskeletal:  Positive for back pain. Negative for arthralgias.   Neurological:  Negative for dizziness and light-headedness.   Psychiatric/Behavioral:  Negative for confusion.    Objective:     Vital Signs (Most Recent):  Temp: 98.3 °F (36.8 °C) (09/12/22 1624)  Pulse: 90 (09/12/22 1624)  Resp: 18 (09/12/22 1624)  BP: 131/77 (09/12/22 1624)  SpO2: (!) 92 % (09/12/22 1624)   Vital Signs (24h Range):  Temp:  [97.9 °F (36.6 °C)-98.6 °F (37 °C)] 98.3 °F (36.8 °C)  Pulse:  [80-90] 90  Resp:  [16-18] 18  SpO2:  [92 %-96 %] 92 %  BP: (102-131)/(56-77) 131/77     Weight: 102.1 kg (225 lb)  Body mass index is 38.62 kg/m².    Estimated Creatinine Clearance: 101.9 mL/min (based on SCr of 0.7 mg/dL).    Physical Exam  Constitutional:       General: She is not in acute distress.     Appearance: Normal appearance. She is not ill-appearing.   Cardiovascular:      Rate and Rhythm: Normal rate and regular rhythm.      Pulses: Normal pulses.      Heart sounds: Normal heart sounds. No murmur heard.    No friction rub. No gallop.   Pulmonary:      Effort: Pulmonary effort is normal. No respiratory distress.      Breath sounds: Normal breath sounds.   Abdominal:      General: Abdomen is flat. Bowel sounds are normal. There is no distension.      Palpations: Abdomen is soft.      Tenderness: There is no abdominal tenderness.   Skin:     General: Skin is warm and dry.   Neurological:      Mental Status: She is alert.       Significant Labs: All pertinent labs within the past 24 hours have been reviewed.    Significant Imaging: I have reviewed all  pertinent imaging results/findings within the past 24 hours.

## 2022-09-12 NOTE — PROGRESS NOTES
[facility]  Progress Note    Patient Name: Yenifer Chatterjee  MRN: [id number]  Patient Class: IP- Inpatient   Admission Date: 9/10/2022  Length of Stay: 1 days  Attending Physician: Jennifer Owen MD  Primary Care Provider: Primary Doctor No        Subjective:     Principal Problem:Positive blood cultures        HPI:  The patient is a 56 year old female with no past medical history but was recently hospitalized on 9/4 for right nephrolithiasis complicated by right pyelonephritis and cystitis on s/p right ureteral stent placement. She was discharged at that time on PO ciprofloxacin. Her blood cultures from 9/4 became positive for klebsiella pneumoniae and Bcx from 9/5 were initially positive for GNRs but since seem to have been changed to NGTD. She was called by urology to present to the ED due to her bacteremia. Since then she has continued to feel fatigued and nauseated. She reports a cough that she has had since 9/4 but states that it is slowly resolving. She denies fever, chills, chest pain, palpitations, or vomiting. She denies dysuria and reports that her hematuria resolved on 9/9.      Overview/Hospital Course:  Ms. Chatterjee is a 55 yo F admitted s/p retrograde pyelography and uretal stent placement. She was referred from Urology due to +Klebsiella BC also noted with 6mm uretal stone. Per ID and Urology, will continue IV antibiotics into outpatient setting. Leukocytosis improving.       Interval History: NAEO. Patient denies fever, chills, n/v. States she feels much better today    Review of Systems   Constitutional:  Negative for chills, fatigue and fever.   HENT:  Negative for congestion.    Eyes:  Negative for visual disturbance.   Respiratory:  Negative for cough and shortness of breath.    Cardiovascular:  Negative for chest pain and leg swelling.   Gastrointestinal:  Negative for abdominal pain, diarrhea, nausea and vomiting.   Endocrine: Positive for polyuria.        2/2 Klebsiella  UTI   Genitourinary:  Positive for urgency.   Musculoskeletal:  Negative for arthralgias.   Skin:  Negative for pallor.   Neurological:  Negative for dizziness and headaches.   Psychiatric/Behavioral:  Negative for behavioral problems. The patient is not nervous/anxious.    Objective:     Vital Signs (Most Recent):  Temp: 97.9 °F (36.6 °C) (09/12/22 1132)  Pulse: 80 (09/12/22 1132)  Resp: 18 (09/12/22 1132)  BP: (!) 103/58 (09/12/22 1132)  SpO2: (!) 93 % (09/12/22 1132)   Vital Signs (24h Range):  Temp:  [97.9 °F (36.6 °C)-98.6 °F (37 °C)] 97.9 °F (36.6 °C)  Pulse:  [80-88] 80  Resp:  [16-18] 18  SpO2:  [93 %-98 %] 93 %  BP: (102-128)/(56-67) 103/58     Weight: 102.1 kg (225 lb)  Body mass index is 38.62 kg/m².  No intake or output data in the 24 hours ending 09/12/22 1514   Physical Exam  Constitutional:       General: She is not in acute distress.     Appearance: Normal appearance. She is normal weight.   HENT:      Head: Normocephalic and atraumatic.      Right Ear: External ear normal.      Left Ear: External ear normal.      Nose: Nose normal.      Mouth/Throat:      Mouth: Mucous membranes are moist.      Pharynx: Oropharynx is clear.   Eyes:      Extraocular Movements: Extraocular movements intact.      Conjunctiva/sclera: Conjunctivae normal.   Cardiovascular:      Rate and Rhythm: Normal rate and regular rhythm.      Heart sounds: Normal heart sounds.   Pulmonary:      Effort: Pulmonary effort is normal.      Breath sounds: Normal breath sounds.   Abdominal:      General: Abdomen is flat. Bowel sounds are normal.      Palpations: Abdomen is soft.   Musculoskeletal:      Cervical back: Neck supple.      Right lower leg: No edema.      Left lower leg: No edema.   Skin:     General: Skin is warm and dry.      Capillary Refill: Capillary refill takes less than 2 seconds.   Neurological:      General: No focal deficit present.      Mental Status: She is alert and oriented to person, place, and time. Mental  status is at baseline.   Psychiatric:         Mood and Affect: Mood normal.         Behavior: Behavior normal.       Significant Labs: All pertinent labs within the past 24 hours have been reviewed.    Significant Imaging: I have reviewed all pertinent imaging results/findings within the past 24 hours.      Assessment/Plan:      * Positive blood cultures  BCx 9/4 positive for pan-sensitive klebsiella pneumonia (consistent w/ urinary cultures). BCx 9/5 positive for GNRs. Was previously treated with PO ciprofloxacin. Received 1 dose of IV CTX in ED. BC NGTD; UC NG    - Continue IV CTX, per ID  - continue to follow ID recs      Other specified anemias  Hgb 11.1 on admission, similar to prior hospitalization. Iron studies consistent with iron deficiency.    - Consider PO iron supplementation after infection cleared  - Trend CBC    Hypoalbuminemia  Albumin 2.3 on admission. Pt. Reports poor appetite since 9/5 which would be unlikely to explain low albumin over short time period.    - Nutrition consulted; Boost plus BID added     Pyelonephritis    -continue IV CTX per ID; f/u recs for duration of course in outpatient setting      Right ureteral calculus  Right 7mm ureteral calculus s/p ureteral stent 9/4. No flank pain on exam. Hematuria resolving.    - Urology consulted- recommends outpatient Uro f/u with Dr. Peacock for management      VTE Risk Mitigation (From admission, onward)         Ordered     enoxaparin injection 40 mg  Daily         09/11/22 0519     IP VTE HIGH RISK PATIENT  Once         09/11/22 0519     Place sequential compression device  Until discontinued         09/11/22 0519                Discharge Planning   ROCIO: 9/13/2022     Code Status: Full Code   Is the patient medically ready for discharge?:     Reason for patient still in hospital (select all that apply): Patient trending condition  Discharge Plan A: Home with family, Home Health          Cherry Morgan MD  Department of Hospital Medicine   [facility]

## 2022-09-13 LAB
ALBUMIN SERPL BCP-MCNC: 2.4 G/DL (ref 3.5–5.2)
ALP SERPL-CCNC: 88 U/L (ref 55–135)
ALT SERPL W/O P-5'-P-CCNC: 42 U/L (ref 10–44)
ANION GAP SERPL CALC-SCNC: 9 MMOL/L (ref 8–16)
AST SERPL-CCNC: 26 U/L (ref 10–40)
BASOPHILS # BLD AUTO: ABNORMAL K/UL (ref 0–0.2)
BASOPHILS NFR BLD: 1 % (ref 0–1.9)
BILIRUB SERPL-MCNC: 0.3 MG/DL (ref 0.1–1)
BUN SERPL-MCNC: 13 MG/DL (ref 6–20)
CALCIUM SERPL-MCNC: 8.7 MG/DL (ref 8.7–10.5)
CHLORIDE SERPL-SCNC: 106 MMOL/L (ref 95–110)
CO2 SERPL-SCNC: 22 MMOL/L (ref 23–29)
CREAT SERPL-MCNC: 0.6 MG/DL (ref 0.5–1.4)
DIFFERENTIAL METHOD: ABNORMAL
EOSINOPHIL # BLD AUTO: ABNORMAL K/UL (ref 0–0.5)
EOSINOPHIL NFR BLD: 1 % (ref 0–8)
ERYTHROCYTE [DISTWIDTH] IN BLOOD BY AUTOMATED COUNT: 14.3 % (ref 11.5–14.5)
EST. GFR  (NO RACE VARIABLE): >60 ML/MIN/1.73 M^2
GLUCOSE SERPL-MCNC: 112 MG/DL (ref 70–110)
HCT VFR BLD AUTO: 32.7 % (ref 37–48.5)
HGB BLD-MCNC: 10.8 G/DL (ref 12–16)
IMM GRANULOCYTES # BLD AUTO: ABNORMAL K/UL (ref 0–0.04)
IMM GRANULOCYTES NFR BLD AUTO: ABNORMAL % (ref 0–0.5)
LYMPHOCYTES # BLD AUTO: ABNORMAL K/UL (ref 1–4.8)
LYMPHOCYTES NFR BLD: 11 % (ref 18–48)
MCH RBC QN AUTO: 31.8 PG (ref 27–31)
MCHC RBC AUTO-ENTMCNC: 33 G/DL (ref 32–36)
MCV RBC AUTO: 96 FL (ref 82–98)
METAMYELOCYTES NFR BLD MANUAL: 1 %
MONOCYTES # BLD AUTO: ABNORMAL K/UL (ref 0.3–1)
MONOCYTES NFR BLD: 3 % (ref 4–15)
MYELOCYTES NFR BLD MANUAL: 2 %
NEUTROPHILS NFR BLD: 80 % (ref 38–73)
NEUTS BAND NFR BLD MANUAL: 1 %
NRBC BLD-RTO: 0 /100 WBC
PLATELET # BLD AUTO: 291 K/UL (ref 150–450)
PLATELET BLD QL SMEAR: ABNORMAL
PMV BLD AUTO: 10.1 FL (ref 9.2–12.9)
POTASSIUM SERPL-SCNC: 3.9 MMOL/L (ref 3.5–5.1)
PROT SERPL-MCNC: 6.6 G/DL (ref 6–8.4)
RBC # BLD AUTO: 3.4 M/UL (ref 4–5.4)
SODIUM SERPL-SCNC: 137 MMOL/L (ref 136–145)
WBC # BLD AUTO: 12.96 K/UL (ref 3.9–12.7)

## 2022-09-13 PROCEDURE — 85027 COMPLETE CBC AUTOMATED: CPT

## 2022-09-13 PROCEDURE — 63600175 PHARM REV CODE 636 W HCPCS

## 2022-09-13 PROCEDURE — 99232 SBSQ HOSP IP/OBS MODERATE 35: CPT | Mod: ,,, | Performed by: INTERNAL MEDICINE

## 2022-09-13 PROCEDURE — 25000003 PHARM REV CODE 250

## 2022-09-13 PROCEDURE — 11000001 HC ACUTE MED/SURG PRIVATE ROOM

## 2022-09-13 PROCEDURE — 36415 COLL VENOUS BLD VENIPUNCTURE: CPT

## 2022-09-13 PROCEDURE — 85007 BL SMEAR W/DIFF WBC COUNT: CPT

## 2022-09-13 PROCEDURE — 99232 PR SUBSEQUENT HOSPITAL CARE,LEVL II: ICD-10-PCS | Mod: ,,, | Performed by: INTERNAL MEDICINE

## 2022-09-13 PROCEDURE — 25000003 PHARM REV CODE 250: Performed by: STUDENT IN AN ORGANIZED HEALTH CARE EDUCATION/TRAINING PROGRAM

## 2022-09-13 PROCEDURE — 80053 COMPREHEN METABOLIC PANEL: CPT

## 2022-09-13 RX ORDER — POTASSIUM CHLORIDE 20 MEQ/1
20 TABLET, EXTENDED RELEASE ORAL ONCE
Status: COMPLETED | OUTPATIENT
Start: 2022-09-13 | End: 2022-09-13

## 2022-09-13 RX ADMIN — POTASSIUM CHLORIDE 20 MEQ: 1500 TABLET, EXTENDED RELEASE ORAL at 08:09

## 2022-09-13 RX ADMIN — CEFTRIAXONE 2 G: 2 INJECTION, SOLUTION INTRAVENOUS at 01:09

## 2022-09-13 RX ADMIN — ENOXAPARIN SODIUM 40 MG: 100 INJECTION SUBCUTANEOUS at 04:09

## 2022-09-13 RX ADMIN — ACETAMINOPHEN 650 MG: 325 TABLET ORAL at 08:09

## 2022-09-13 NOTE — PHYSICIAN QUERY
PT Name: Yenifer Chatterjee  MR #: [id number]     DOCUMENTATION CLARIFICATION      CDS/: Luz Cody RN               Contact information:[email or web address]  This form is a permanent document in the medical record.    Query Date: September 13, 2022    By submitting this query, we are merely seeking further clarification of documentation to reflect the severity of illness of your patient. Please utilize your independent clinical judgment when addressing the question(s) below.     The Medical Record contains the following:   Indicators   Supporting Clinical Findings Location in Medical Record   x Documentation of Sepsis, Septic Shock Right ureteral stone and urosepsis  Pyonephrosis on stent placement    Patient is a 55 yo F with no known PMH who presents to [facility] ED due to  a few days of SOB, nausea, vomiting, generalized weakness, headache, abdominal pain. Patient states that she went to [place] ED yesterday for evaluation, got 1 L of fluids and IV Zofran and felt significant symptomatic improvement and went home. Her abdominal pain has resolved since visiting the [place] ED. She states that she has had nausea and vomiting since 3 days ago. She also reports subjective fevers. She denies gross hematuria, dysuria, difficulty voiding    Urology consulted for CTA C/A/P which shows a 9mm right proximal ureteral stone with mild proximal hydronephrosis. There is no left sided hydro or left ureteral stones. Bladder unremarkable. Report states there is no PE. WBC 14.65. Cr 1.4 (unknown baseline). Lactate 2.17. Clean catch UA nitrite negative, 24 RBC, > 100 WBC, many bacteria, 2 SEC. She last ate on Friday. She last drank at 8am      Positive for chills, fatigue and fever OP note 9/5      Urology CN 9/4   x Blood Culture KLEBSIELLA PNEUMONIAE     KLEBSIELLA VARIICOLA Lab 9/4-9/5    Respiratory Culture     x Urine Culture KLEBSIELLA PNEUMONIAE   >100,000 cfu/ml  Lab 9/4    Other Culture      Acute/Chronic Illness     x  Medication/Treatment LR 1L bolus once x4 in ED  Rocephin 1gm IV x1 in ED  Rocephin 2 gm IV daily MAR 9/4-9/7    Other        Provider, please further specify the _urosepsis_ diagnosis:   [   ] Sepsis due to K. Pneumoniae   [   ] Sepsis due to K. Variicola   [  x ] Sepsis due to K Pneumoniae and Variicola   [   ] Sepsis ruled out   [   ] Due to (please specify): __________________________________   [   ] Other specification (please specify): ____________________   [   ] Clinically Undetermined       Please document in your progress notes daily for the duration of treatment until resolved, and include in your discharge summary.  Form No. [id number]

## 2022-09-13 NOTE — PROGRESS NOTES
[facility] Medicine  Progress Note    Patient Name: Yenifer Chatterjee  MRN: [id number]  Patient Class: IP- Inpatient   Admission Date: 9/10/2022  Length of Stay: 2 days  Attending Physician: Jennifer Owen MD  Primary Care Provider: Primary Doctor No        Subjective:     Principal Problem:Positive blood cultures        HPI:  The patient is a 56 year old female with no past medical history but was recently hospitalized on 9/4 for right nephrolithiasis complicated by right pyelonephritis and cystitis on s/p right ureteral stent placement. She was discharged at that time on PO ciprofloxacin. Her blood cultures from 9/4 became positive for klebsiella pneumoniae and Bcx from 9/5 were initially positive for GNRs but since seem to have been changed to NGTD. She was called by urology to present to the ED due to her bacteremia. Since then she has continued to feel fatigued and nauseated. She reports a cough that she has had since 9/4 but states that it is slowly resolving. She denies fever, chills, chest pain, palpitations, or vomiting. She denies dysuria and reports that her hematuria resolved on 9/9.      Overview/Hospital Course:  Ms. Chatterjee is a 57 yo F admitted s/p retrograde pyelography and uretal stent placement. She was referred from Urology due to +Klebsiella BC also noted with 6mm uretal stone. Per ID and Urology, will continue IV antibiotics into outpatient setting. Leukocytosis improving.       Interval History: NAEO. Feeling well. Appreciate ID recs, awaiting Bcx from 9/10 to finalize NGTD (should finalize late 9/14), then can d/c home on cipro, f/u ID 1-2 weeks, and f/u urology as scheduled.    Review of Systems   Constitutional:  Negative for chills, fatigue and fever.   HENT:  Negative for congestion.    Eyes:  Negative for visual disturbance.   Respiratory:  Negative for cough and shortness of breath.    Cardiovascular:  Negative for chest pain and leg swelling.   Gastrointestinal:  Negative  for abdominal pain, diarrhea, nausea and vomiting.   Endocrine: Positive for polyuria.        2/2 Klebsiella UTI   Genitourinary:  Positive for urgency.   Musculoskeletal:  Negative for arthralgias.   Skin:  Negative for pallor.   Neurological:  Negative for dizziness and headaches.   Psychiatric/Behavioral:  Negative for behavioral problems. The patient is not nervous/anxious.    Objective:     Vital Signs (Most Recent):  Temp: 97.7 °F (36.5 °C) (09/13/22 1140)  Pulse: 77 (09/13/22 1140)  Resp: 20 (09/13/22 1140)  BP: 117/61 (09/13/22 1140)  SpO2: (!) 94 % (09/13/22 1140)   Vital Signs (24h Range):  Temp:  [97.7 °F (36.5 °C)-98.8 °F (37.1 °C)] 97.7 °F (36.5 °C)  Pulse:  [76-92] 77  Resp:  [18-20] 20  SpO2:  [92 %-97 %] 94 %  BP: (104-131)/(61-77) 117/61     Weight: 102.1 kg (225 lb)  Body mass index is 38.62 kg/m².    Intake/Output Summary (Last 24 hours) at 9/13/2022 1336  Last data filed at 9/13/2022 0800  Gross per 24 hour   Intake 200 ml   Output 625 ml   Net -425 ml        Physical Exam  Constitutional:       General: She is not in acute distress.     Appearance: Normal appearance. She is normal weight.   HENT:      Head: Normocephalic and atraumatic.      Right Ear: External ear normal.      Left Ear: External ear normal.      Nose: Nose normal.      Mouth/Throat:      Mouth: Mucous membranes are moist.      Pharynx: Oropharynx is clear.   Eyes:      Extraocular Movements: Extraocular movements intact.      Conjunctiva/sclera: Conjunctivae normal.   Cardiovascular:      Rate and Rhythm: Normal rate and regular rhythm.      Heart sounds: Normal heart sounds.   Pulmonary:      Effort: Pulmonary effort is normal.      Breath sounds: Normal breath sounds.   Abdominal:      General: Abdomen is flat. Bowel sounds are normal.      Palpations: Abdomen is soft.   Musculoskeletal:      Cervical back: Neck supple.      Right lower leg: No edema.      Left lower leg: No edema.   Skin:     General: Skin is warm and dry.       Capillary Refill: Capillary refill takes less than 2 seconds.   Neurological:      General: No focal deficit present.      Mental Status: She is alert and oriented to person, place, and time. Mental status is at baseline.   Psychiatric:         Mood and Affect: Mood normal.         Behavior: Behavior normal.       Significant Labs: All pertinent labs within the past 24 hours have been reviewed.    Significant Imaging: I have reviewed all pertinent imaging results/findings within the past 24 hours.      Assessment/Plan:      * Positive blood cultures  BCx 9/4 positive for pan-sensitive klebsiella pneumonia (consistent w/ urinary cultures). BCx 9/5 positive for GNRs. Was previously treated with PO ciprofloxacin. Received 1 dose of IV CTX in ED. BC NGTD; UC NG    - Appreciate ID recs, awaiting Bcx from 9/10 to finalize NGTD (should finalize late 9/14), then can d/c home on cipro, f/u ID 1-2 weeks, and f/u urology as scheduled.  - BCx 9/11 NGTD, can be monitored outpatient and address at f/u      Other specified anemias  Hgb 11.1 on admission, similar to prior hospitalization. Iron studies consistent with iron deficiency.    - Consider PO iron supplementation after infection cleared  - Trend CBC    Hypoalbuminemia  Albumin 2.3 on admission. Pt. Reports poor appetite since 9/5 which would be unlikely to explain low albumin over short time period.    - Nutrition consulted; Boost plus BID added     Pyelonephritis  -See bacteremia    Right ureteral calculus  Right 7mm ureteral calculus s/p ureteral stent 9/4. No flank pain on exam. Hematuria resolving.    - Urology consulted- recommends outpatient Uro f/u with Dr. Peacock for management      VTE Risk Mitigation (From admission, onward)         Ordered     enoxaparin injection 40 mg  Daily         09/11/22 0519     IP VTE HIGH RISK PATIENT  Once         09/11/22 0519     Place sequential compression device  Until discontinued         09/11/22 0519                Discharge  Planning   ROCIO: 9/15/2022     Code Status: Full Code   Is the patient medically ready for discharge?:     Reason for patient still in hospital (select all that apply): Patient trending condition  Discharge Plan A: Home with family, Home Health                  Darlene Mcdonnell DO  Department of Hospital Medicine   [facility] - Surgery

## 2022-09-13 NOTE — ASSESSMENT & PLAN NOTE
Yenifer Chatterjee is a 56 year old female with a history of bacteremia and ureteral stone s/p right ureteral stent on 9/4/22.   - no present indication for urologic intervention, plan to continue antibiotics through definitive stone treatment  - urine culture 9/11 with no growth   - agree with IV antibiotics  - most recent blood cultures NGTD  -Patient has outpatient follow up with Dr Peacock

## 2022-09-13 NOTE — SUBJECTIVE & OBJECTIVE
Interval History: NAEO. Feeling well. Appreciate ID recs, awaiting Bcx from 9/10 to finalize NGTD (should finalize late 9/14), then can d/c home on cipro, f/u ID 1-2 weeks, and f/u urology as scheduled.    Review of Systems   Constitutional:  Negative for chills, fatigue and fever.   HENT:  Negative for congestion.    Eyes:  Negative for visual disturbance.   Respiratory:  Negative for cough and shortness of breath.    Cardiovascular:  Negative for chest pain and leg swelling.   Gastrointestinal:  Negative for abdominal pain, diarrhea, nausea and vomiting.   Endocrine: Positive for polyuria.        2/2 Klebsiella UTI   Genitourinary:  Positive for urgency.   Musculoskeletal:  Negative for arthralgias.   Skin:  Negative for pallor.   Neurological:  Negative for dizziness and headaches.   Psychiatric/Behavioral:  Negative for behavioral problems. The patient is not nervous/anxious.    Objective:     Vital Signs (Most Recent):  Temp: 97.7 °F (36.5 °C) (09/13/22 1140)  Pulse: 77 (09/13/22 1140)  Resp: 20 (09/13/22 1140)  BP: 117/61 (09/13/22 1140)  SpO2: (!) 94 % (09/13/22 1140)   Vital Signs (24h Range):  Temp:  [97.7 °F (36.5 °C)-98.8 °F (37.1 °C)] 97.7 °F (36.5 °C)  Pulse:  [76-92] 77  Resp:  [18-20] 20  SpO2:  [92 %-97 %] 94 %  BP: (104-131)/(61-77) 117/61     Weight: 102.1 kg (225 lb)  Body mass index is 38.62 kg/m².    Intake/Output Summary (Last 24 hours) at 9/13/2022 1336  Last data filed at 9/13/2022 0800  Gross per 24 hour   Intake 200 ml   Output 625 ml   Net -425 ml        Physical Exam  Constitutional:       General: She is not in acute distress.     Appearance: Normal appearance. She is normal weight.   HENT:      Head: Normocephalic and atraumatic.      Right Ear: External ear normal.      Left Ear: External ear normal.      Nose: Nose normal.      Mouth/Throat:      Mouth: Mucous membranes are moist.      Pharynx: Oropharynx is clear.   Eyes:      Extraocular Movements: Extraocular movements intact.       Conjunctiva/sclera: Conjunctivae normal.   Cardiovascular:      Rate and Rhythm: Normal rate and regular rhythm.      Heart sounds: Normal heart sounds.   Pulmonary:      Effort: Pulmonary effort is normal.      Breath sounds: Normal breath sounds.   Abdominal:      General: Abdomen is flat. Bowel sounds are normal.      Palpations: Abdomen is soft.   Musculoskeletal:      Cervical back: Neck supple.      Right lower leg: No edema.      Left lower leg: No edema.   Skin:     General: Skin is warm and dry.      Capillary Refill: Capillary refill takes less than 2 seconds.   Neurological:      General: No focal deficit present.      Mental Status: She is alert and oriented to person, place, and time. Mental status is at baseline.   Psychiatric:         Mood and Affect: Mood normal.         Behavior: Behavior normal.       Significant Labs: All pertinent labs within the past 24 hours have been reviewed.    Significant Imaging: I have reviewed all pertinent imaging results/findings within the past 24 hours.

## 2022-09-13 NOTE — ASSESSMENT & PLAN NOTE
BCx 9/4 positive for pan-sensitive klebsiella pneumonia (consistent w/ urinary cultures). BCx 9/5 positive for GNRs. Was previously treated with PO ciprofloxacin. Received 1 dose of IV CTX in ED. BC NGTD; UC NG    - Appreciate ID recs, awaiting Bcx from 9/10 to finalize NGTD (should finalize late 9/14), then can d/c home on cipro, f/u ID 1-2 weeks, and f/u urology as scheduled.  - BCx 9/11 NGTD, can be monitored outpatient and address at f/u

## 2022-09-13 NOTE — SUBJECTIVE & OBJECTIVE
Interval History: AFVSS.     Objective:     Temp:  [97.9 °F (36.6 °C)-98.8 °F (37.1 °C)] 98.3 °F (36.8 °C)  Pulse:  [80-92] 83  Resp:  [18] 18  SpO2:  [92 %-97 %] 94 %  BP: (103-131)/(58-77) 109/66     Body mass index is 38.62 kg/m².           Drains       None                   Physical Exam  Vitals reviewed.   Constitutional:       General: She is not in acute distress.     Appearance: She is well-developed. She is not diaphoretic.   HENT:      Head: Normocephalic and atraumatic.   Eyes:      General: No scleral icterus.  Cardiovascular:      Rate and Rhythm: Normal rate.   Pulmonary:      Effort: Pulmonary effort is normal. No respiratory distress.      Breath sounds: No stridor.   Abdominal:      General: There is no distension.      Palpations: Abdomen is soft.      Tenderness: There is no abdominal tenderness. There is no right CVA tenderness or left CVA tenderness.   Genitourinary:     Comments: Ureteral stent not visible at meatus  Musculoskeletal:         General: Normal range of motion.      Cervical back: Normal range of motion.   Skin:     General: Skin is warm and dry.   Neurological:      Mental Status: She is alert.   Psychiatric:         Behavior: Behavior normal.       Significant Labs:    BMP:  Recent Labs   Lab 09/07/22  0349 09/10/22  2228 09/12/22  0519    135* 137   K 4.0 4.1 4.1    106 108   CO2 22* 20* 21*   BUN 13 20 13   CREATININE 0.7 0.8 0.7   CALCIUM 8.7 8.8 8.6*       CBC:   Recent Labs   Lab 09/07/22  0349 09/10/22  2220 09/12/22  0519   WBC 9.92 15.10* 12.86*   HGB 10.4* 11.1* 11.0*   HCT 30.8* 31.6* 32.6*   PLT 90* 207 266       All pertinent labs results from the past 24 hours have been reviewed.    Significant Imaging:  All pertinent imaging results/findings from the past 24 hours have been reviewed.

## 2022-09-14 LAB
ALBUMIN SERPL BCP-MCNC: 2.5 G/DL (ref 3.5–5.2)
ALP SERPL-CCNC: 95 U/L (ref 55–135)
ALT SERPL W/O P-5'-P-CCNC: 37 U/L (ref 10–44)
ANION GAP SERPL CALC-SCNC: 9 MMOL/L (ref 8–16)
AST SERPL-CCNC: 22 U/L (ref 10–40)
BASOPHILS # BLD AUTO: ABNORMAL K/UL (ref 0–0.2)
BASOPHILS NFR BLD: 1 % (ref 0–1.9)
BILIRUB SERPL-MCNC: 0.3 MG/DL (ref 0.1–1)
BUN SERPL-MCNC: 15 MG/DL (ref 6–20)
CALCIUM SERPL-MCNC: 8.7 MG/DL (ref 8.7–10.5)
CHLORIDE SERPL-SCNC: 110 MMOL/L (ref 95–110)
CO2 SERPL-SCNC: 23 MMOL/L (ref 23–29)
CREAT SERPL-MCNC: 0.7 MG/DL (ref 0.5–1.4)
DIFFERENTIAL METHOD: ABNORMAL
EOSINOPHIL # BLD AUTO: ABNORMAL K/UL (ref 0–0.5)
EOSINOPHIL NFR BLD: 1 % (ref 0–8)
ERYTHROCYTE [DISTWIDTH] IN BLOOD BY AUTOMATED COUNT: 14 % (ref 11.5–14.5)
EST. GFR  (NO RACE VARIABLE): >60 ML/MIN/1.73 M^2
GLUCOSE SERPL-MCNC: 108 MG/DL (ref 70–110)
HCT VFR BLD AUTO: 34.3 % (ref 37–48.5)
HGB BLD-MCNC: 11.3 G/DL (ref 12–16)
IMM GRANULOCYTES # BLD AUTO: ABNORMAL K/UL (ref 0–0.04)
IMM GRANULOCYTES NFR BLD AUTO: ABNORMAL % (ref 0–0.5)
LYMPHOCYTES # BLD AUTO: ABNORMAL K/UL (ref 1–4.8)
LYMPHOCYTES NFR BLD: 14 % (ref 18–48)
MCH RBC QN AUTO: 31.9 PG (ref 27–31)
MCHC RBC AUTO-ENTMCNC: 32.9 G/DL (ref 32–36)
MCV RBC AUTO: 97 FL (ref 82–98)
METAMYELOCYTES NFR BLD MANUAL: 2 %
MONOCYTES # BLD AUTO: ABNORMAL K/UL (ref 0.3–1)
MONOCYTES NFR BLD: 4 % (ref 4–15)
MYELOCYTES NFR BLD MANUAL: 2 %
NEUTROPHILS NFR BLD: 73 % (ref 38–73)
NEUTS BAND NFR BLD MANUAL: 3 %
NRBC BLD-RTO: 0 /100 WBC
PLATELET # BLD AUTO: 283 K/UL (ref 150–450)
PMV BLD AUTO: 10 FL (ref 9.2–12.9)
POTASSIUM SERPL-SCNC: 4.1 MMOL/L (ref 3.5–5.1)
PROT SERPL-MCNC: 6.7 G/DL (ref 6–8.4)
RBC # BLD AUTO: 3.54 M/UL (ref 4–5.4)
SODIUM SERPL-SCNC: 142 MMOL/L (ref 136–145)
WBC # BLD AUTO: 12.4 K/UL (ref 3.9–12.7)

## 2022-09-14 PROCEDURE — 25000003 PHARM REV CODE 250

## 2022-09-14 PROCEDURE — 36415 COLL VENOUS BLD VENIPUNCTURE: CPT

## 2022-09-14 PROCEDURE — 80053 COMPREHEN METABOLIC PANEL: CPT

## 2022-09-14 PROCEDURE — 85007 BL SMEAR W/DIFF WBC COUNT: CPT

## 2022-09-14 PROCEDURE — 63600175 PHARM REV CODE 636 W HCPCS

## 2022-09-14 PROCEDURE — 87040 BLOOD CULTURE FOR BACTERIA: CPT | Mod: 59

## 2022-09-14 PROCEDURE — 99232 PR SUBSEQUENT HOSPITAL CARE,LEVL II: ICD-10-PCS | Mod: ,,, | Performed by: INTERNAL MEDICINE

## 2022-09-14 PROCEDURE — 99232 SBSQ HOSP IP/OBS MODERATE 35: CPT | Mod: ,,, | Performed by: INTERNAL MEDICINE

## 2022-09-14 PROCEDURE — 11000001 HC ACUTE MED/SURG PRIVATE ROOM

## 2022-09-14 PROCEDURE — 85027 COMPLETE CBC AUTOMATED: CPT

## 2022-09-14 RX ADMIN — ENOXAPARIN SODIUM 40 MG: 100 INJECTION SUBCUTANEOUS at 04:09

## 2022-09-14 RX ADMIN — CEFTRIAXONE 2 G: 2 INJECTION, SOLUTION INTRAVENOUS at 01:09

## 2022-09-14 RX ADMIN — ACETAMINOPHEN 650 MG: 325 TABLET ORAL at 08:09

## 2022-09-14 NOTE — PROGRESS NOTES
[facility]  Progress Note    Patient Name: Yenifer Chatterjee  MRN: [id number]  Patient Class: IP- Inpatient   Admission Date: 9/10/2022  Length of Stay: 3 days  Attending Physician: Jennifer Owen MD  Primary Care Provider: Primary Doctor No        Subjective:     Principal Problem:Positive blood cultures        HPI:  The patient is a 56 year old female with no past medical history but was recently hospitalized on 9/4 for right nephrolithiasis complicated by right pyelonephritis and cystitis on s/p right ureteral stent placement. She was discharged at that time on PO ciprofloxacin. Her blood cultures from 9/4 became positive for klebsiella pneumoniae and Bcx from 9/5 were initially positive for GNRs but since seem to have been changed to NGTD. She was called by urology to present to the ED due to her bacteremia. Since then she has continued to feel fatigued and nauseated. She reports a cough that she has had since 9/4 but states that it is slowly resolving. She denies fever, chills, chest pain, palpitations, or vomiting. She denies dysuria and reports that her hematuria resolved on 9/9.      Overview/Hospital Course:  Ms. Chatterjee is a 57 yo F admitted s/p retrograde pyelography and uretal stent placement. She was referred from Urology due to +Klebsiella BC also noted with 6mm uretal stone. Per ID and Urology, will continue IV antibiotics into outpatient setting. Leukocytosis improving. On morning of 9/14 patient had bright red urine per purwick with lower abd cramping; per urology this is normal with stent placement, will CTM. Patient is otherwise stable for discharge pending update of blood culture collected on 9/10.       Interval History: NAEO. Patient concerned about bright red urine per purwick with lower abd cramping; cramping relieved with Tylenol. Per urology, red urine is benign s/p stent placement. Patient otherwise feeling well and stable for discharge pending update of 9/10 BC.      Review of Systems   Constitutional:  Negative for chills, fatigue and fever.   HENT:  Negative for congestion.    Eyes:  Negative for visual disturbance.   Respiratory:  Negative for cough and shortness of breath.    Cardiovascular:  Negative for chest pain and leg swelling.   Gastrointestinal:  Negative for abdominal pain, diarrhea, nausea and vomiting.   Endocrine: Positive for polyuria.        2/2 Klebsiella UTI   Genitourinary:  Positive for urgency.        +bright red urine   Musculoskeletal:  Negative for arthralgias.   Skin:  Negative for pallor.   Neurological:  Negative for dizziness and headaches.   Psychiatric/Behavioral:  Negative for behavioral problems. The patient is not nervous/anxious.    Objective:     Vital Signs (Most Recent):  Temp: 99 °F (37.2 °C) (09/14/22 1150)  Pulse: 79 (09/14/22 1150)  Resp: 18 (09/14/22 1150)  BP: 123/70 (09/14/22 1150)  SpO2: 95 % (09/14/22 1150)   Vital Signs (24h Range):  Temp:  [97.3 °F (36.3 °C)-99 °F (37.2 °C)] 99 °F (37.2 °C)  Pulse:  [79-91] 79  Resp:  [16-19] 18  SpO2:  [93 %-98 %] 95 %  BP: (111-123)/(58-70) 123/70     Weight: 102.1 kg (225 lb)  Body mass index is 38.62 kg/m².    Intake/Output Summary (Last 24 hours) at 9/14/2022 1540  Last data filed at 9/14/2022 0842  Gross per 24 hour   Intake 300 ml   Output 620 ml   Net -320 ml      Physical Exam  Constitutional:       General: She is not in acute distress.     Appearance: Normal appearance. She is normal weight.   HENT:      Head: Normocephalic and atraumatic.      Right Ear: External ear normal.      Left Ear: External ear normal.      Nose: Nose normal.      Mouth/Throat:      Mouth: Mucous membranes are moist.      Pharynx: Oropharynx is clear.   Eyes:      Extraocular Movements: Extraocular movements intact.      Conjunctiva/sclera: Conjunctivae normal.   Cardiovascular:      Rate and Rhythm: Normal rate and regular rhythm.      Heart sounds: Normal heart sounds.   Pulmonary:      Effort: Pulmonary  effort is normal.      Breath sounds: Normal breath sounds.   Abdominal:      General: Abdomen is flat. Bowel sounds are normal.      Palpations: Abdomen is soft.      Tenderness: There is no abdominal tenderness.      Comments: No lower abd tenderness on palpation   Musculoskeletal:      Cervical back: Neck supple.      Right lower leg: No edema.      Left lower leg: No edema.   Skin:     General: Skin is warm and dry.      Capillary Refill: Capillary refill takes less than 2 seconds.   Neurological:      General: No focal deficit present.      Mental Status: She is alert and oriented to person, place, and time. Mental status is at baseline.   Psychiatric:         Mood and Affect: Mood normal.         Behavior: Behavior normal.       Significant Labs: All pertinent labs within the past 24 hours have been reviewed.    Significant Imaging: I have reviewed all pertinent imaging results/findings within the past 24 hours.      Assessment/Plan:      * Positive blood cultures  BCx 9/4 positive for pan-sensitive klebsiella pneumonia (consistent w/ urinary cultures). BCx 9/5 positive for GNRs. Was previously treated with PO ciprofloxacin. Received 1 dose of IV CTX in ED. BC NGTD; UC NG    - Appreciate ID recs, awaiting Bcx from 9/10 to finalize NGTD (should finalize late 9/14), then can d/c home on cipro, f/u ID 1-2 weeks, and f/u urology as scheduled.  - BCx 9/11 NGTD, can be monitored outpatient and address at f/u      Other specified anemias  Hgb 11.1 on admission, similar to prior hospitalization. Iron studies consistent with iron deficiency.    - Consider PO iron supplementation after infection cleared  - Trend CBC    Hypoalbuminemia  Albumin 2.3 on admission. Pt. Reports poor appetite since 9/5 which would be unlikely to explain low albumin over short time period.    - Nutrition consulted; Boost plus BID added     Pyelonephritis  -See bacteremia    Right ureteral calculus  Right 7mm ureteral calculus s/p ureteral  stent 9/4. No flank pain on exam. Hematuria resolving.    - Urology consulted- recommends outpatient Uro f/u with Dr. Peacock for management      VTE Risk Mitigation (From admission, onward)         Ordered     enoxaparin injection 40 mg  Daily         09/11/22 0519     IP VTE HIGH RISK PATIENT  Once         09/11/22 0519     Place sequential compression device  Until discontinued         09/11/22 0519                Discharge Planning   ROCIO: 9/15/2022     Code Status: Full Code   Is the patient medically ready for discharge?:     Reason for patient still in hospital (select all that apply): Patient trending condition  Discharge Plan A: Home with family, Home Health          Cherry Morgan MD  Department of Hospital Medicine   [facility] - Surgery

## 2022-09-14 NOTE — SUBJECTIVE & OBJECTIVE
Interval History: NAEO. Patient concerned about bright red urine per purwick with lower abd cramping; cramping relieved with Tylenol. Per urology, red urine is benign s/p stent placement. Patient otherwise feeling well and stable for discharge pending update of 9/10 BC.     Review of Systems   Constitutional:  Negative for chills, fatigue and fever.   HENT:  Negative for congestion.    Eyes:  Negative for visual disturbance.   Respiratory:  Negative for cough and shortness of breath.    Cardiovascular:  Negative for chest pain and leg swelling.   Gastrointestinal:  Negative for abdominal pain, diarrhea, nausea and vomiting.   Endocrine: Positive for polyuria.        2/2 Klebsiella UTI   Genitourinary:  Positive for urgency.        +bright red urine   Musculoskeletal:  Negative for arthralgias.   Skin:  Negative for pallor.   Neurological:  Negative for dizziness and headaches.   Psychiatric/Behavioral:  Negative for behavioral problems. The patient is not nervous/anxious.    Objective:     Vital Signs (Most Recent):  Temp: 99 °F (37.2 °C) (09/14/22 1150)  Pulse: 79 (09/14/22 1150)  Resp: 18 (09/14/22 1150)  BP: 123/70 (09/14/22 1150)  SpO2: 95 % (09/14/22 1150)   Vital Signs (24h Range):  Temp:  [97.3 °F (36.3 °C)-99 °F (37.2 °C)] 99 °F (37.2 °C)  Pulse:  [79-91] 79  Resp:  [16-19] 18  SpO2:  [93 %-98 %] 95 %  BP: (111-123)/(58-70) 123/70     Weight: 102.1 kg (225 lb)  Body mass index is 38.62 kg/m².    Intake/Output Summary (Last 24 hours) at 9/14/2022 1540  Last data filed at 9/14/2022 0842  Gross per 24 hour   Intake 300 ml   Output 620 ml   Net -320 ml      Physical Exam  Constitutional:       General: She is not in acute distress.     Appearance: Normal appearance. She is normal weight.   HENT:      Head: Normocephalic and atraumatic.      Right Ear: External ear normal.      Left Ear: External ear normal.      Nose: Nose normal.      Mouth/Throat:      Mouth: Mucous membranes are moist.      Pharynx:  Oropharynx is clear.   Eyes:      Extraocular Movements: Extraocular movements intact.      Conjunctiva/sclera: Conjunctivae normal.   Cardiovascular:      Rate and Rhythm: Normal rate and regular rhythm.      Heart sounds: Normal heart sounds.   Pulmonary:      Effort: Pulmonary effort is normal.      Breath sounds: Normal breath sounds.   Abdominal:      General: Abdomen is flat. Bowel sounds are normal.      Palpations: Abdomen is soft.      Tenderness: There is no abdominal tenderness.      Comments: No lower abd tenderness on palpation   Musculoskeletal:      Cervical back: Neck supple.      Right lower leg: No edema.      Left lower leg: No edema.   Skin:     General: Skin is warm and dry.      Capillary Refill: Capillary refill takes less than 2 seconds.   Neurological:      General: No focal deficit present.      Mental Status: She is alert and oriented to person, place, and time. Mental status is at baseline.   Psychiatric:         Mood and Affect: Mood normal.         Behavior: Behavior normal.       Significant Labs: All pertinent labs within the past 24 hours have been reviewed.    Significant Imaging: I have reviewed all pertinent imaging results/findings within the past 24 hours.

## 2022-09-14 NOTE — PROGRESS NOTES
Pt with red color urine, no discomfort  when voiding , just a slight crampying noted llq of abd, Dr. Carey at bedside,speaking with patient about the crampying.

## 2022-09-15 VITALS
WEIGHT: 225 LBS | DIASTOLIC BLOOD PRESSURE: 62 MMHG | TEMPERATURE: 98 F | HEART RATE: 80 BPM | RESPIRATION RATE: 18 BRPM | OXYGEN SATURATION: 95 % | HEIGHT: 64 IN | SYSTOLIC BLOOD PRESSURE: 110 MMHG | BODY MASS INDEX: 38.41 KG/M2

## 2022-09-15 LAB
ALBUMIN SERPL BCP-MCNC: 2.7 G/DL (ref 3.5–5.2)
ALP SERPL-CCNC: 87 U/L (ref 55–135)
ALT SERPL W/O P-5'-P-CCNC: 32 U/L (ref 10–44)
ANION GAP SERPL CALC-SCNC: 8 MMOL/L (ref 8–16)
AST SERPL-CCNC: 17 U/L (ref 10–40)
BACTERIA BLD CULT: NORMAL
BACTERIA BLD CULT: NORMAL
BASOPHILS # BLD AUTO: 0.07 K/UL (ref 0–0.2)
BASOPHILS NFR BLD: 0.6 % (ref 0–1.9)
BILIRUB SERPL-MCNC: 0.4 MG/DL (ref 0.1–1)
BUN SERPL-MCNC: 15 MG/DL (ref 6–20)
CALCIUM SERPL-MCNC: 8.9 MG/DL (ref 8.7–10.5)
CHLORIDE SERPL-SCNC: 108 MMOL/L (ref 95–110)
CO2 SERPL-SCNC: 23 MMOL/L (ref 23–29)
CREAT SERPL-MCNC: 0.7 MG/DL (ref 0.5–1.4)
DIFFERENTIAL METHOD: ABNORMAL
EOSINOPHIL # BLD AUTO: 0.1 K/UL (ref 0–0.5)
EOSINOPHIL NFR BLD: 1.2 % (ref 0–8)
ERYTHROCYTE [DISTWIDTH] IN BLOOD BY AUTOMATED COUNT: 13.9 % (ref 11.5–14.5)
EST. GFR  (NO RACE VARIABLE): >60 ML/MIN/1.73 M^2
GLUCOSE SERPL-MCNC: 104 MG/DL (ref 70–110)
HCT VFR BLD AUTO: 34.1 % (ref 37–48.5)
HGB BLD-MCNC: 11.5 G/DL (ref 12–16)
IMM GRANULOCYTES # BLD AUTO: 0.54 K/UL (ref 0–0.04)
IMM GRANULOCYTES NFR BLD AUTO: 4.7 % (ref 0–0.5)
LYMPHOCYTES # BLD AUTO: 1.4 K/UL (ref 1–4.8)
LYMPHOCYTES NFR BLD: 12 % (ref 18–48)
MCH RBC QN AUTO: 32.1 PG (ref 27–31)
MCHC RBC AUTO-ENTMCNC: 33.7 G/DL (ref 32–36)
MCV RBC AUTO: 95 FL (ref 82–98)
MONOCYTES # BLD AUTO: 0.9 K/UL (ref 0.3–1)
MONOCYTES NFR BLD: 7.7 % (ref 4–15)
NEUTROPHILS # BLD AUTO: 8.5 K/UL (ref 1.8–7.7)
NEUTROPHILS NFR BLD: 73.8 % (ref 38–73)
NRBC BLD-RTO: 0 /100 WBC
PLATELET # BLD AUTO: 324 K/UL (ref 150–450)
PMV BLD AUTO: 9.8 FL (ref 9.2–12.9)
POTASSIUM SERPL-SCNC: 4 MMOL/L (ref 3.5–5.1)
PROT SERPL-MCNC: 6.9 G/DL (ref 6–8.4)
RBC # BLD AUTO: 3.58 M/UL (ref 4–5.4)
SODIUM SERPL-SCNC: 139 MMOL/L (ref 136–145)
WBC # BLD AUTO: 11.56 K/UL (ref 3.9–12.7)

## 2022-09-15 PROCEDURE — 80053 COMPREHEN METABOLIC PANEL: CPT

## 2022-09-15 PROCEDURE — 99239 PR HOSPITAL DISCHARGE DAY,>30 MIN: ICD-10-PCS | Mod: ,,, | Performed by: HOSPITALIST

## 2022-09-15 PROCEDURE — 63600175 PHARM REV CODE 636 W HCPCS

## 2022-09-15 PROCEDURE — 99239 HOSP IP/OBS DSCHRG MGMT >30: CPT | Mod: ,,, | Performed by: HOSPITALIST

## 2022-09-15 PROCEDURE — 36415 COLL VENOUS BLD VENIPUNCTURE: CPT

## 2022-09-15 PROCEDURE — 85025 COMPLETE CBC W/AUTO DIFF WBC: CPT

## 2022-09-15 RX ORDER — CIPROFLOXACIN 500 MG/1
750 TABLET ORAL EVERY 12 HOURS
Qty: 27 TABLET | Refills: 0 | Status: SHIPPED | OUTPATIENT
Start: 2022-09-15 | End: 2022-10-02

## 2022-09-15 RX ORDER — CIPROFLOXACIN 500 MG/1
750 TABLET ORAL EVERY 12 HOURS
Qty: 27 TABLET | Refills: 0
Start: 2022-09-15 | End: 2022-09-15 | Stop reason: SDUPTHER

## 2022-09-15 RX ADMIN — CEFTRIAXONE 2 G: 2 INJECTION, SOLUTION INTRAVENOUS at 01:09

## 2022-09-15 NOTE — DISCHARGE INSTRUCTIONS
-- Continue ciprofloxacin through 9/24  -- Follow up with infectious disease 9/29  -- Follow up with urology 9/26

## 2022-09-15 NOTE — DISCHARGE SUMMARY
[facility]  Discharge Summary      Patient Name: Yenifer Chatterjee  MRN: [id number]  Patient Class: IP- Inpatient  Admission Date: 9/10/2022  Hospital Length of Stay: 4 days  Discharge Date and Time:  09/15/2022 9:28 AM  Attending Physician: Cyndie Cornejo MD   Discharging Provider: Cherry Morgan MD  Primary Care Provider: Primary Doctor [facility] Medicine Team: [facility] 4 Cherry Morgan MD    HPI:   The patient is a 56 year old female with no past medical history but was recently hospitalized on 9/4 for right nephrolithiasis complicated by right pyelonephritis and cystitis on s/p right ureteral stent placement. She was discharged at that time on PO ciprofloxacin. Her blood cultures from 9/4 became positive for klebsiella pneumoniae and Bcx from 9/5 were initially positive for GNRs but since seem to have been changed to NGTD. She was called by urology to present to the ED due to her bacteremia. Since then she has continued to feel fatigued and nauseated. She reports a cough that she has had since 9/4 but states that it is slowly resolving. She denies fever, chills, chest pain, palpitations, or vomiting. She denies dysuria and reports that her hematuria resolved on 9/9.      * No surgery found *      Hospital Course:   Ms. Chatterjee is a 55 yo F admitted s/p retrograde pyelography and uretal stent placement. She was referred from Urology due to +Klebsiella BC also noted with 6mm uretal stone. Per ID and Urology, will continue IV antibiotics into outpatient setting. Leukocytosis improving. On morning of 9/14 patient had bright red urine per purwick with lower abd cramping; per urology this is normal with stent placement, will CTM. Patient is otherwise stable for discharge pending update of blood culture collected on 9/10.      Review of Systems   Constitutional: Negative for chills.   HENT: Negative for congestion.    Eyes: Negative for photophobia.   Respiratory: Negative for cough and  "shortness of breath.    Cardiovascular: Negative for chest pain and palpitations.   Gastrointestinal: Negative for abdominal pain, nausea and vomiting.   Genitourinary: Negative for dysuria.        +bright red urine; slightly improved   Musculoskeletal: Negative for myalgias.   Neurological: Negative for dizziness and headaches.   Psychiatric/Behavioral: The patient is not nervous/anxious.      /61 (BP Location: Right arm, Patient Position: Lying)   Pulse 83   Temp 96 °F (35.6 °C) (Oral)   Resp 20   Ht 5' 4" (1.626 m)   Wt 102.1 kg (225 lb)   SpO2 (!) 91%   Breastfeeding No   BMI 38.62 kg/m²      Physical Exam  Constitutional:       Appearance: Normal appearance. She is normal weight.   HENT:      Head: Normocephalic and atraumatic.      Right Ear: External ear normal.      Left Ear: External ear normal.      Nose: Nose normal.      Mouth/Throat:      Mouth: Mucous membranes are moist.      Pharynx: Oropharynx is clear.   Eyes:      General: No scleral icterus.     Extraocular Movements: Extraocular movements intact.      Conjunctiva/sclera: Conjunctivae normal.   Cardiovascular:      Rate and Rhythm: Normal rate and regular rhythm.      Heart sounds: Normal heart sounds.   Pulmonary:      Effort: Pulmonary effort is normal.      Breath sounds: Normal breath sounds.   Abdominal:      General: Abdomen is flat. Bowel sounds are normal.      Palpations: Abdomen is soft.   Musculoskeletal:      Cervical back: Neck supple.      Right lower leg: No edema.      Left lower leg: No edema.   Skin:     General: Skin is warm and dry.      Capillary Refill: Capillary refill takes less than 2 seconds.   Neurological:      General: No focal deficit present.      Mental Status: She is alert and oriented to person, place, and time. Mental status is at baseline.   Psychiatric:         Mood and Affect: Mood normal.         Behavior: Behavior normal.         Goals of Care Treatment Preferences:  Code Status: Full " Code      Consults:   Consults (From admission, onward)        Status Ordering Provider     Inpatient consult to Urology  Once        Provider:  (Not yet assigned)    Completed FELIX RUFF     Inpatient consult to Infectious Diseases  Once        Provider:  (Not yet assigned)    Completed FELIX RUFF     Inpatient consult to Registered Dietitian/Nutritionist  Once        Provider:  (Not yet assigned)    Completed FELIX RUFF          No new Assessment & Plan notes have been filed under this hospital service since the last note was generated.  Service: Hospital Medicine    Final Active Diagnoses:    Diagnosis Date Noted POA    PRINCIPAL PROBLEM:  Positive blood cultures [R78.81] 09/11/2022 Yes    Pyelonephritis [N12] 09/11/2022 Yes    Hypoalbuminemia [E88.09] 09/11/2022 Yes    Other specified anemias [D64.89] 09/11/2022 Yes    Right ureteral calculus [N20.1] 09/04/2022 Unknown      Problems Resolved During this Admission:       Discharged Condition: good    Disposition: Home or Self Care    Follow Up:   Follow-up Information     Primary Doctor No .                     Patient Instructions:      Ambulatory referral/consult to Internal Medicine   Standing Status: Future   Referral Priority: Routine Referral Type: Consultation   Referral Reason: Specialty Services Required   Requested Specialty: Internal Medicine   Number of Visits Requested: 1     Ambulatory referral/consult to Urology   Standing Status: Future   Referral Priority: Routine Referral Type: Consultation   Referral Reason: Specialty Services Required   Referred to Provider: SUE CRISOSTOMO Requested Specialty: Urology   Number of Visits Requested: 1     Ambulatory referral/consult to Infectious Disease   Standing Status: Future   Referral Priority: Routine Referral Type: Consultation   Referral Reason: Specialty Services Required   Requested Specialty: Infectious Diseases   Number of Visits Requested: 1       Significant Diagnostic Studies:  Labs: All labs within the past 24 hours have been reviewed    Pending Diagnostic Studies:     Procedure Component Value Units Date/Time    CBC with Automated Differential [[id number]] Collected: 09/15/22 0729    Order Status: Sent Lab Status: In process Updated: 09/15/22 0824    Specimen: Blood          Medications:  Reconciled Home Medications:      Medication List      CONTINUE taking these medications    ciprofloxacin HCl 500 MG tablet  Commonly known as: CIPRO  Take 1.5 tablets (750 mg total) by mouth every 12 (twelve) hours. for 9 days     oxybutynin 5 MG Tab  Commonly known as: DITROPAN  Take 1 tablet (5 mg total) by mouth 3 (three) times daily.     tamsulosin 0.4 mg Cap  Commonly known as: FLOMAX  Take 1 capsule (0.4 mg total) by mouth once daily. for 14 days        ASK your doctor about these medications    ondansetron 4 MG Tbdl  Commonly known as: ZOFRAN-ODT  Take 1 tablet (4 mg total) by mouth every 6 (six) hours as needed (nausea vomiting).            Indwelling Lines/Drains at time of discharge:   Lines/Drains/Airways     None                 Time spent on the discharge of patient: 35 minutes      Cherry Morgan MD  Department of Hospital Medicine  [facility] - [facility]

## 2022-09-16 LAB
BACTERIA BLD CULT: NORMAL
BACTERIA BLD CULT: NORMAL

## 2022-09-19 LAB
BACTERIA BLD CULT: NORMAL
BACTERIA BLD CULT: NORMAL

## 2022-09-21 NOTE — PLAN OF CARE
[facility]  Discharge Final Note    Primary Care Provider: Primary Doctor No    Expected Discharge Date: 9/15/2022    Final Discharge Note (most recent)       Final Note - 09/21/22 0945          Final Note    Assessment Type Final Discharge Note     Anticipated Discharge Disposition Home or Self Care     What phone number can be called within the next 1-3 days to see how you are doing after discharge? --   [phone]    Hospital Resources/Appts/Education Provided Appointments scheduled and added to AVS                     Important Message from Medicare      Future Appointments   Date Time Provider Department Center   9/22/2022  1:30 PM Monet Barrett PA-C [facility] IM [facility]   9/26/2022  2:45 PM METH XR1 300 LB LIMIT METH XRAY [place]   9/26/2022  3:40 PM Vladimir Peacock MD [facility] UROLOGY [place]   9/29/2022 11:00 AM Woo Sanchez MD [facility] ID [facility]     Patient discharged home to care of family on 9/15/22.         Contact Info       No, Primary Doctor   Relationship: PCP - General        Next Steps: Follow up    Monet Barrett PA-C   Specialty: Internal Medicine    [address]  [facility] [address]   Phone: [phone]       Next Steps: Follow up    Instructions: PCP appt scheduled for 9/22/22 @ 130PM

## 2022-09-22 ENCOUNTER — OFFICE VISIT (OUTPATIENT)
Dept: INTERNAL MEDICINE | Facility: CLINIC | Age: 57
End: 2022-09-22
Payer: COMMERCIAL

## 2022-09-22 VITALS
BODY MASS INDEX: 38.02 KG/M2 | HEART RATE: 97 BPM | SYSTOLIC BLOOD PRESSURE: 108 MMHG | WEIGHT: 222.69 LBS | HEIGHT: 64 IN | DIASTOLIC BLOOD PRESSURE: 74 MMHG | OXYGEN SATURATION: 97 %

## 2022-09-22 DIAGNOSIS — Z87.448 HISTORY OF PYELONEPHRITIS: ICD-10-CM

## 2022-09-22 DIAGNOSIS — Z09 HOSPITAL DISCHARGE FOLLOW-UP: Primary | ICD-10-CM

## 2022-09-22 DIAGNOSIS — N20.1 RIGHT URETERAL CALCULUS: ICD-10-CM

## 2022-09-22 PROCEDURE — 99999 PR PBB SHADOW E&M-EST. PATIENT-LVL IV: ICD-10-PCS | Mod: PBBFAC,,, | Performed by: PHYSICIAN ASSISTANT

## 2022-09-22 PROCEDURE — 99214 PR OFFICE/OUTPT VISIT, EST, LEVL IV, 30-39 MIN: ICD-10-PCS | Mod: S$GLB,,, | Performed by: PHYSICIAN ASSISTANT

## 2022-09-22 PROCEDURE — 99999 PR PBB SHADOW E&M-EST. PATIENT-LVL IV: CPT | Mod: PBBFAC,,, | Performed by: PHYSICIAN ASSISTANT

## 2022-09-22 PROCEDURE — 99214 OFFICE O/P EST MOD 30 MIN: CPT | Mod: S$GLB,,, | Performed by: PHYSICIAN ASSISTANT

## 2022-09-22 NOTE — PROGRESS NOTES
Subjective:       Patient ID: Yenifer Chatterjee is a 56 y.o. female.    Chief Complaint: Follow-up      Established pt of Primary Doctor No (new to me)    HPI          Pt here for hospital discharge follow up after admission for pyelonephritis and right ureteral (See hospital course below)    Admission Date: 9/10/2022  Hospital Length of Stay: 4 days  Discharge Date and Time:  09/15/2022 9:28 AM  Hospital Course:   Ms. Chatterjee is a 55 yo F admitted s/p retrograde pyelography and uretal stent placement. She was referred from Urology due to +Klebsiella BC also noted with 6mm uretal stone. Per ID and Urology, will continue IV antibiotics into outpatient setting. Leukocytosis improving. On morning of 9/14 patient had bright red urine per purwick with lower abd cramping; per urology this is normal with stent placement, will CTM. Patient is otherwise stable for discharge pending update of blood culture collected on 9/10.     Admission Date: 9/4/2022  Hospital Length of Stay: 3 days  Discharge Date and Time:  09/07/2022    Procedure(s) (LRB):  CYSTOSCOPY, WITH URETERAL STENT INSERTION (Right)  PYELOGRAM, RETROGRADE (Right)       Hospital Course (synopsis of major diagnoses, care, treatment, and services provided during the course of the hospital stay): Patient presented to [facility] and underwent above procedure. Tolerated the procedure well and was transferred to the floor after recovery from anesthesia. Please see the operative note for further procedure details. Vitals remained stable throughout the post operative period. Physical exam was appropriate for post operative state.  Diet was advanced, and the patient was able to tolerate a regular diet prior to discharge. Patient was ambulating on POD1 without issues. Patient was deemed suitable for discharge on No discharge date for patient encounter.          Transitional Care Note    Family and/or Caretaker present at visit?  Yes.  Diagnostic tests reviewed/disposition: No diagnosic  tests pending after this hospitalization.  Disease/illness education: Pyelonephritis, Kidney stone  Home health/community services discussion/referrals: Patient does not have home health established from hospital visit.  They do not need home health.  If needed, we will set up home health for the patient.   Establishment or re-establishment of referral orders for community resources: No other necessary community resources.   Discussion with other health care providers: No discussion with other health care providers necessary.         Today pt is gradually feeling better, tolerating cipro. Notes urinary freq, bladder spasm and mild nausea. Not taking flomax or oxybutynin. Not taking Zofran. Feeling fatigue, emotional/tearful about being ill and recent hospitalizations.       No past medical history on file.    Social History     Tobacco Use    Smoking status: Never    Smokeless tobacco: Never   Substance Use Topics    Alcohol use: No    Drug use: No       Review of patient's allergies indicates:  No Known Allergies      Current Outpatient Medications:     ciprofloxacin HCl (CIPRO) 500 MG tablet, Take 1.5 tablets (750 mg total) by mouth every 12 (twelve) hours. for 9 days, Disp: 27 tablet, Rfl: 0    ondansetron (ZOFRAN-ODT) 4 MG TbDL, Take 1 tablet (4 mg total) by mouth every 6 (six) hours as needed (nausea vomiting). (Patient not taking: No sig reported), Disp: 30 tablet, Rfl: 0    oxybutynin (DITROPAN) 5 MG Tab, Take 1 tablet (5 mg total) by mouth 3 (three) times daily. (Patient not taking: Reported on 9/22/2022), Disp: 90 tablet, Rfl: 0    tamsulosin (FLOMAX) 0.4 mg Cap, Take 1 capsule (0.4 mg total) by mouth once daily. for 14 days (Patient not taking: Reported on 9/22/2022), Disp: 14 capsule, Rfl: 0    Family History   Problem Relation Age of Onset    Diabetes Mother     Hypertension Mother     Diabetes Father     Hypertension Father        Past Surgical History:   Procedure Laterality Date    CYSTOSCOPY W/  "URETERAL STENT PLACEMENT Right 9/4/2022    Procedure: CYSTOSCOPY, WITH URETERAL STENT INSERTION;  Surgeon: Ree Pak MD;  Location: [facility] OR [address] [place] [address];  Service: Urology;  Laterality: Right;    RETROGRADE PYELOGRAPHY Right 9/4/2022    Procedure: PYELOGRAM, RETROGRADE;  Surgeon: Ree Pak MD;  Location: [facility] OR [address] [place] [address];  Service: Urology;  Laterality: Right;       Review of Systems   Constitutional:  Positive for fatigue. Negative for chills, fever and unexpected weight change.   Respiratory:  Negative for cough and shortness of breath.    Cardiovascular:  Negative for chest pain and leg swelling.   Gastrointestinal:  Negative for abdominal pain, nausea and vomiting.        Bladder spasms   Genitourinary:  Positive for frequency.   Integumentary:  Negative for rash.   Neurological:  Negative for weakness and headaches.     Objective: /74 (BP Location: Left arm, Patient Position: Sitting, BP Method: Large (Manual))   Pulse 97   Ht 5' 4" (1.626 m)   Wt 101 kg (222 lb 10.6 oz)   SpO2 97%   BMI 38.22 kg/m²         Physical Exam  Vitals reviewed.   Constitutional:       General: She is not in acute distress.     Appearance: She is well-developed.   HENT:      Head: Normocephalic and atraumatic.   Cardiovascular:      Rate and Rhythm: Normal rate and regular rhythm.      Heart sounds: No murmur heard.  Pulmonary:      Effort: Pulmonary effort is normal.      Breath sounds: Normal breath sounds. No wheezing or rales.   Abdominal:      General: Bowel sounds are normal.      Palpations: Abdomen is soft.      Tenderness: There is no abdominal tenderness.   Musculoskeletal:      Right lower leg: No edema.      Left lower leg: No edema.   Skin:     General: Skin is warm and dry.      Findings: No rash.   Neurological:      Mental Status: She is alert.       Assessment:       1. Hospital discharge follow-up    2. History of pyelonephritis    3. Right ureteral calculus          Plan:             Yenifer was " seen today for follow-up.    Diagnoses and all orders for this visit:    Hospital discharge follow-up    History of pyelonephritis    Right ureteral calculus    Stable since discharge  Continue cipro as prescribed  Clarify oxybutynin/flomax with Urology  Advised to try zofran prn  Keep f/u with ID and Urology next week  Discussed need to choose MD as PCP (List provided)    Future Appointments   Date Time Provider Department Center   9/26/2022  2:45 PM METH XR1 300 LB LIMIT METH XRAY [place]   9/26/2022  3:40 PM Vladimir Peacock MD [facility] UROLOGY [place]   9/29/2022 11:00 AM Woo Sanchez MD [facility] ID [facility] Barrett PA-C

## 2022-09-22 NOTE — PATIENT INSTRUCTIONS
SCHEDULE APPOINTMENT WITH A MD TO FULLY ESTABLISH CARE     Waqar Walker ([facility] [place] [address]):  - Jake Bianchi M.D.  - Jihan Vogel M.D  - Chema Brewer M.D.  - Etta Munoz M.D.

## 2022-09-23 ENCOUNTER — HOSPITAL ENCOUNTER (OUTPATIENT)
Facility: HOSPITAL | Age: 57
Discharge: HOME | End: 2022-09-23
Attending: EMERGENCY MEDICINE | Admitting: UROLOGY
Payer: COMMERCIAL

## 2022-09-23 ENCOUNTER — ANESTHESIA EVENT (OUTPATIENT)
Dept: SURGERY | Facility: HOSPITAL | Age: 57
End: 2022-09-23
Payer: COMMERCIAL

## 2022-09-23 ENCOUNTER — ANESTHESIA (OUTPATIENT)
Dept: SURGERY | Facility: HOSPITAL | Age: 57
End: 2022-09-23
Payer: COMMERCIAL

## 2022-09-23 ENCOUNTER — NURSE TRIAGE (OUTPATIENT)
Dept: ADMINISTRATIVE | Facility: CLINIC | Age: 57
End: 2022-09-23
Payer: COMMERCIAL

## 2022-09-23 VITALS
TEMPERATURE: 98 F | DIASTOLIC BLOOD PRESSURE: 66 MMHG | BODY MASS INDEX: 37.73 KG/M2 | HEIGHT: 64 IN | SYSTOLIC BLOOD PRESSURE: 122 MMHG | RESPIRATION RATE: 23 BRPM | OXYGEN SATURATION: 97 % | WEIGHT: 221 LBS | HEART RATE: 100 BPM

## 2022-09-23 DIAGNOSIS — N20.1 RIGHT URETERAL CALCULUS: ICD-10-CM

## 2022-09-23 DIAGNOSIS — N20.0 KIDNEY STONE: Primary | ICD-10-CM

## 2022-09-23 DIAGNOSIS — T83.122A DISPLACEMENT OF URETERAL STENT, INITIAL ENCOUNTER: ICD-10-CM

## 2022-09-23 LAB — SARS-COV-2 RDRP RESP QL NAA+PROBE: NEGATIVE

## 2022-09-23 PROCEDURE — 99285 EMERGENCY DEPT VISIT HI MDM: CPT | Mod: 25

## 2022-09-23 PROCEDURE — C1769 GUIDE WIRE: HCPCS | Performed by: UROLOGY

## 2022-09-23 PROCEDURE — 71000015 HC POSTOP RECOV 1ST HR: Performed by: UROLOGY

## 2022-09-23 PROCEDURE — 52356 CYSTO/URETERO W/LITHOTRIPSY: CPT | Mod: LT,,, | Performed by: UROLOGY

## 2022-09-23 PROCEDURE — 63600175 PHARM REV CODE 636 W HCPCS: Performed by: NURSE ANESTHETIST, CERTIFIED REGISTERED

## 2022-09-23 PROCEDURE — U0002 COVID-19 LAB TEST NON-CDC: HCPCS | Performed by: STUDENT IN AN ORGANIZED HEALTH CARE EDUCATION/TRAINING PROGRAM

## 2022-09-23 PROCEDURE — C1894 INTRO/SHEATH, NON-LASER: HCPCS | Performed by: UROLOGY

## 2022-09-23 PROCEDURE — 37000009 HC ANESTHESIA EA ADD 15 MINS: Performed by: UROLOGY

## 2022-09-23 PROCEDURE — D9220A PRA ANESTHESIA: Mod: ANES,,, | Performed by: ANESTHESIOLOGY

## 2022-09-23 PROCEDURE — 74420 UROGRAPHY RTRGR +-KUB: CPT | Mod: 26,,, | Performed by: UROLOGY

## 2022-09-23 PROCEDURE — 99284 PR EMERGENCY DEPT VISIT,LEVEL IV: ICD-10-PCS | Mod: CS,,, | Performed by: EMERGENCY MEDICINE

## 2022-09-23 PROCEDURE — 99284 EMERGENCY DEPT VISIT MOD MDM: CPT | Mod: CS,,, | Performed by: EMERGENCY MEDICINE

## 2022-09-23 PROCEDURE — 71000016 HC POSTOP RECOV ADDL HR: Performed by: UROLOGY

## 2022-09-23 PROCEDURE — 25000003 PHARM REV CODE 250: Performed by: EMERGENCY MEDICINE

## 2022-09-23 PROCEDURE — 71000044 HC DOSC ROUTINE RECOVERY FIRST HOUR: Performed by: UROLOGY

## 2022-09-23 PROCEDURE — 27201423 OPTIME MED/SURG SUP & DEVICES STERILE SUPPLY: Performed by: UROLOGY

## 2022-09-23 PROCEDURE — 74420 PR  X-RAY RETROGRADE PYELOGRAM: ICD-10-PCS | Mod: 26,,, | Performed by: UROLOGY

## 2022-09-23 PROCEDURE — 52356 PR CYSTO/URETERO W/LITHOTRIPSY: ICD-10-PCS | Mod: LT,,, | Performed by: UROLOGY

## 2022-09-23 PROCEDURE — 37000008 HC ANESTHESIA 1ST 15 MINUTES: Performed by: UROLOGY

## 2022-09-23 PROCEDURE — 63600175 PHARM REV CODE 636 W HCPCS: Performed by: STUDENT IN AN ORGANIZED HEALTH CARE EDUCATION/TRAINING PROGRAM

## 2022-09-23 PROCEDURE — D9220A PRA ANESTHESIA: ICD-10-PCS | Mod: ANES,,, | Performed by: ANESTHESIOLOGY

## 2022-09-23 PROCEDURE — 36000706: Performed by: UROLOGY

## 2022-09-23 PROCEDURE — D9220A PRA ANESTHESIA: Mod: CRNA,,, | Performed by: NURSE ANESTHETIST, CERTIFIED REGISTERED

## 2022-09-23 PROCEDURE — 25000003 PHARM REV CODE 250: Performed by: NURSE ANESTHETIST, CERTIFIED REGISTERED

## 2022-09-23 PROCEDURE — C2617 STENT, NON-COR, TEM W/O DEL: HCPCS | Performed by: UROLOGY

## 2022-09-23 PROCEDURE — D9220A PRA ANESTHESIA: ICD-10-PCS | Mod: CRNA,,, | Performed by: NURSE ANESTHETIST, CERTIFIED REGISTERED

## 2022-09-23 PROCEDURE — 82365 CALCULUS SPECTROSCOPY: CPT | Performed by: UROLOGY

## 2022-09-23 PROCEDURE — 36000707: Performed by: UROLOGY

## 2022-09-23 PROCEDURE — 25000003 PHARM REV CODE 250: Performed by: ANESTHESIOLOGY

## 2022-09-23 DEVICE — STENT URETERAL UNIV 6FR 26CM: Type: IMPLANTABLE DEVICE | Site: URETER | Status: FUNCTIONAL

## 2022-09-23 RX ORDER — HALOPERIDOL 5 MG/ML
0.5 INJECTION INTRAMUSCULAR EVERY 10 MIN PRN
Status: DISCONTINUED | OUTPATIENT
Start: 2022-09-23 | End: 2022-09-23 | Stop reason: HOSPADM

## 2022-09-23 RX ORDER — PROPOFOL 10 MG/ML
VIAL (ML) INTRAVENOUS
Status: DISCONTINUED | OUTPATIENT
Start: 2022-09-23 | End: 2022-09-23

## 2022-09-23 RX ORDER — ONDANSETRON 2 MG/ML
4 INJECTION INTRAMUSCULAR; INTRAVENOUS DAILY PRN
Status: DISCONTINUED | OUTPATIENT
Start: 2022-09-23 | End: 2022-09-23 | Stop reason: HOSPADM

## 2022-09-23 RX ORDER — CIPROFLOXACIN 500 MG/1
500 TABLET ORAL
Status: COMPLETED | OUTPATIENT
Start: 2022-09-23 | End: 2022-09-23

## 2022-09-23 RX ORDER — SODIUM CHLORIDE 0.9 % (FLUSH) 0.9 %
3 SYRINGE (ML) INJECTION
Status: DISCONTINUED | OUTPATIENT
Start: 2022-09-23 | End: 2022-09-23 | Stop reason: HOSPADM

## 2022-09-23 RX ORDER — CEFAZOLIN SODIUM 1 G/3ML
INJECTION, POWDER, FOR SOLUTION INTRAMUSCULAR; INTRAVENOUS
Status: DISCONTINUED | OUTPATIENT
Start: 2022-09-23 | End: 2022-09-23

## 2022-09-23 RX ORDER — HYDROMORPHONE HYDROCHLORIDE 1 MG/ML
0.2 INJECTION, SOLUTION INTRAMUSCULAR; INTRAVENOUS; SUBCUTANEOUS EVERY 5 MIN PRN
Status: DISCONTINUED | OUTPATIENT
Start: 2022-09-23 | End: 2022-09-23 | Stop reason: HOSPADM

## 2022-09-23 RX ORDER — FENTANYL CITRATE 50 UG/ML
INJECTION, SOLUTION INTRAMUSCULAR; INTRAVENOUS
Status: DISCONTINUED | OUTPATIENT
Start: 2022-09-23 | End: 2022-09-23

## 2022-09-23 RX ORDER — OXYCODONE AND ACETAMINOPHEN 5; 325 MG/1; MG/1
1 TABLET ORAL
Status: DISCONTINUED | OUTPATIENT
Start: 2022-09-23 | End: 2022-09-23 | Stop reason: HOSPADM

## 2022-09-23 RX ORDER — NEOSTIGMINE METHYLSULFATE 0.5 MG/ML
INJECTION, SOLUTION INTRAVENOUS
Status: DISCONTINUED | OUTPATIENT
Start: 2022-09-23 | End: 2022-09-23

## 2022-09-23 RX ORDER — ROCURONIUM BROMIDE 10 MG/ML
INJECTION, SOLUTION INTRAVENOUS
Status: DISCONTINUED | OUTPATIENT
Start: 2022-09-23 | End: 2022-09-23

## 2022-09-23 RX ORDER — LIDOCAINE HYDROCHLORIDE 20 MG/ML
INJECTION INTRAVENOUS
Status: DISCONTINUED | OUTPATIENT
Start: 2022-09-23 | End: 2022-09-23

## 2022-09-23 RX ORDER — KETOROLAC TROMETHAMINE 30 MG/ML
INJECTION, SOLUTION INTRAMUSCULAR; INTRAVENOUS
Status: DISCONTINUED | OUTPATIENT
Start: 2022-09-23 | End: 2022-09-23

## 2022-09-23 RX ORDER — TAMSULOSIN HYDROCHLORIDE 0.4 MG/1
0.4 CAPSULE ORAL DAILY
Qty: 30 CAPSULE | Refills: 0 | Status: ON HOLD | OUTPATIENT
Start: 2022-09-23 | End: 2024-03-19 | Stop reason: HOSPADM

## 2022-09-23 RX ORDER — DEXAMETHASONE SODIUM PHOSPHATE 4 MG/ML
INJECTION, SOLUTION INTRA-ARTICULAR; INTRALESIONAL; INTRAMUSCULAR; INTRAVENOUS; SOFT TISSUE
Status: DISCONTINUED | OUTPATIENT
Start: 2022-09-23 | End: 2022-09-23

## 2022-09-23 RX ORDER — SODIUM CHLORIDE, SODIUM LACTATE, POTASSIUM CHLORIDE, CALCIUM CHLORIDE 600; 310; 30; 20 MG/100ML; MG/100ML; MG/100ML; MG/100ML
INJECTION, SOLUTION INTRAVENOUS CONTINUOUS
Status: DISCONTINUED | OUTPATIENT
Start: 2022-09-23 | End: 2022-09-23 | Stop reason: HOSPADM

## 2022-09-23 RX ORDER — MIDAZOLAM HYDROCHLORIDE 1 MG/ML
INJECTION, SOLUTION INTRAMUSCULAR; INTRAVENOUS
Status: DISCONTINUED | OUTPATIENT
Start: 2022-09-23 | End: 2022-09-23

## 2022-09-23 RX ADMIN — KETOROLAC TROMETHAMINE 30 MG: 30 INJECTION, SOLUTION INTRAMUSCULAR; INTRAVENOUS at 12:09

## 2022-09-23 RX ADMIN — DEXAMETHASONE SODIUM PHOSPHATE 4 MG: 4 INJECTION, SOLUTION INTRAMUSCULAR; INTRAVENOUS at 12:09

## 2022-09-23 RX ADMIN — NEOSTIGMINE METHYLSULFATE 5 MG: 0.5 INJECTION, SOLUTION INTRAVENOUS at 01:09

## 2022-09-23 RX ADMIN — PROPOFOL 50 MG: 10 INJECTION, EMULSION INTRAVENOUS at 01:09

## 2022-09-23 RX ADMIN — OXYCODONE HYDROCHLORIDE AND ACETAMINOPHEN 1 TABLET: 5; 325 TABLET ORAL at 02:09

## 2022-09-23 RX ADMIN — SODIUM CHLORIDE: 9 INJECTION, SOLUTION INTRAVENOUS at 12:09

## 2022-09-23 RX ADMIN — CEFAZOLIN 2 G: 330 INJECTION, POWDER, FOR SOLUTION INTRAMUSCULAR; INTRAVENOUS at 12:09

## 2022-09-23 RX ADMIN — ROCURONIUM BROMIDE 40 MG: 10 INJECTION INTRAVENOUS at 12:09

## 2022-09-23 RX ADMIN — FENTANYL CITRATE 50 MCG: 50 INJECTION, SOLUTION INTRAMUSCULAR; INTRAVENOUS at 12:09

## 2022-09-23 RX ADMIN — PROPOFOL 150 MG: 10 INJECTION, EMULSION INTRAVENOUS at 12:09

## 2022-09-23 RX ADMIN — CIPROFLOXACIN 500 MG: 500 TABLET, FILM COATED ORAL at 08:09

## 2022-09-23 RX ADMIN — LIDOCAINE HYDROCHLORIDE 100 MG: 20 INJECTION INTRAVENOUS at 12:09

## 2022-09-23 RX ADMIN — GLYCOPYRROLATE 0.8 MG: 0.2 INJECTION INTRAMUSCULAR; INTRAVENOUS at 01:09

## 2022-09-23 RX ADMIN — SODIUM CHLORIDE, SODIUM LACTATE, POTASSIUM CHLORIDE, AND CALCIUM CHLORIDE: .6; .31; .03; .02 INJECTION, SOLUTION INTRAVENOUS at 08:09

## 2022-09-23 RX ADMIN — MIDAZOLAM HYDROCHLORIDE 2 MG: 1 INJECTION, SOLUTION INTRAMUSCULAR; INTRAVENOUS at 12:09

## 2022-09-23 NOTE — ED NOTES
Urologist at bedside obtaining consent for procedure. Consent signed and witnessed and at bedside with pt stickers. Pt has been undressed and placed in hospital gown. All pt belongings are with  at bedside. Procedure scheduled for 1300 today. NPO status enforced. No other needs at this time.

## 2022-09-23 NOTE — ED TRIAGE NOTES
Pt report string hanging from urethra. Pt has hx of stent placement about 2 weeks ago. Discharged from hospital 1 week ago after being treated for sepsis. Reports bladder spasms, abdm discomfort, and says can not hold pee in. Denies fever, burning with urination, and hematuria.

## 2022-09-23 NOTE — TRANSFER OF CARE
"Anesthesia Transfer of Care Note    Patient: Yenifer Chatterjee    Procedure(s) Performed: Procedure(s) (LRB):  URETEROSCOPY (Right)  CYSTOSCOPY (N/A)  PYELOSCOPY (Right)  LITHOTRIPSY, USING LASER (Right)  REMOVAL, CALCULUS, URETER, URETEROSCOPIC (Right)  PYELOGRAM, RETROGRADE (Right)    Patient location: PACU    Anesthesia Type: general    Transport from OR: Transported from OR on room air with adequate spontaneous ventilation and Transported from OR on 6-10 L/min O2 by NC with adequate spontaneous ventilation    Post pain: Adequate analgesia    Post assessment: no apparent anesthetic complications and tolerated procedure well    Last vitals:   Visit Vitals  BP (!) 158/93 (BP Location: Right arm, Patient Position: Lying)   Pulse 82   Temp 36.7 °C (98.1 °F) (Skin)   Resp 16   Ht 5' 4" (1.626 m)   Wt 100.2 kg (221 lb)   SpO2 99%   Breastfeeding No   BMI 37.93 kg/m²       Post vital signs: stable    Level of consciousness: awake, alert  and oriented    Nausea/Vomiting: no nausea/no vomiting    Complications: none  "

## 2022-09-23 NOTE — SUBJECTIVE & OBJECTIVE
History reviewed. No pertinent past medical history.    Past Surgical History:   Procedure Laterality Date    CYSTOSCOPY W/ URETERAL STENT PLACEMENT Right 9/4/2022    Procedure: CYSTOSCOPY, WITH URETERAL STENT INSERTION;  Surgeon: Ree Pak MD;  Location: [address];  Service: Urology;  Laterality: Right;    RETROGRADE PYELOGRAPHY Right 9/4/2022    Procedure: PYELOGRAM, RETROGRADE;  Surgeon: Ree Pak MD;  Location: [address];  Service: Urology;  Laterality: Right;       Review of patient's allergies indicates:  No Known Allergies    Family History       Problem Relation (Age of Onset)    Diabetes Mother, Father    Hypertension Mother, Father            Tobacco Use    Smoking status: Never    Smokeless tobacco: Never   Substance and Sexual Activity    Alcohol use: No    Drug use: No    Sexual activity: Yes     Partners: Male       Review of Systems   Constitutional:  Positive for fatigue. Negative for activity change, chills and fever.   Respiratory:  Negative for cough and shortness of breath.    Cardiovascular:  Negative for chest pain.   Gastrointestinal:  Negative for abdominal pain, diarrhea, nausea and vomiting.   Genitourinary:  Positive for frequency. Negative for difficulty urinating, flank pain, hematuria and urgency.   Neurological:  Negative for dizziness and weakness.     Objective:     Temp:  [97.8 °F (36.6 °C)] 97.8 °F (36.6 °C)  Pulse:  [] 84  Resp:  [15-16] 16  SpO2:  [97 %-98 %] 98 %  BP: (108-140)/(73-80) 138/73     Body mass index is 37.93 kg/m².           Drains       None                   Physical Exam  Vitals reviewed.   Constitutional:       General: She is not in acute distress.     Appearance: She is well-developed. She is not diaphoretic.   HENT:      Head: Normocephalic and atraumatic.   Eyes:      General: No scleral icterus.  Cardiovascular:      Rate and Rhythm: Normal rate.   Pulmonary:      Effort: Pulmonary effort is normal. No respiratory distress.       Breath sounds: No stridor.   Abdominal:      General: There is no distension.      Palpations: Abdomen is soft.      Tenderness: There is no abdominal tenderness. There is no right CVA tenderness or left CVA tenderness.   Genitourinary:     Comments: Ureteral stent distal to meatus with coil  Musculoskeletal:         General: Normal range of motion.      Cervical back: Normal range of motion.   Skin:     General: Skin is warm and dry.   Neurological:      General: No focal deficit present.      Mental Status: She is alert and oriented to person, place, and time.   Psychiatric:         Mood and Affect: Mood normal.         Behavior: Behavior normal.       Significant Labs:    BMP:  No results for input(s): NA, K, CL, CO2, BUN, CREATININE, LABGLOM, GLUCOSE, CALCIUM in the last 168 hours.    CBC:  No results for input(s): WBC, HGB, HCT, PLT in the last 168 hours.    Urine Studies: No results for input(s): COLORU, APPEARANCEUA, PHUR, SPECGRAV, PROTEINUA, GLUCUA, KETONESU, BILIRUBINUA, OCCULTUA, NITRITE, UROBILINOGEN, LEUKOCYTESUR, RBCUA, WBCUA, BACTERIA, SQUAMEPITHEL, HYALINECASTS in the last 168 hours.    Invalid input(s): WRIGHTSUR  All pertinent labs results from the past 24 hours have been reviewed.    Significant Imaging:  No new imaging. Review per HPI.

## 2022-09-23 NOTE — ED PROVIDER NOTES
"Encounter Date: 9/23/2022       History     Chief Complaint   Patient presents with    Abdominal Cramping     Pt had stent placed 2 weeks, pt states the stent is now "hanging out of my urethra." States she is having bladder spasms. Pt was discharge from hospital last Thursday with sepsis. Has an appointment with nephrology on Monday     56-year-old female, history of ureteral stone complicated by infection, bacteremia, status post ureteral stent placement on September 4th here by Urology requiring 2 admissions for management including pyelonephritis, now presenting with pelvic pain, onset last night, moderate to severe, limiting her ability to lie flat, associated with noticing her ureteral stents hanging from her urethra last night.  Denies fevers, chills, otherwise feeling well.    The history is provided by the patient.   Review of patient's allergies indicates:  No Known Allergies  History reviewed. No pertinent past medical history.  Past Surgical History:   Procedure Laterality Date    CYSTOSCOPY W/ URETERAL STENT PLACEMENT Right 9/4/2022    Procedure: CYSTOSCOPY, WITH URETERAL STENT INSERTION;  Surgeon: Ree Pak MD;  Location: [facility] OR [address] [place] [address];  Service: Urology;  Laterality: Right;    RETROGRADE PYELOGRAPHY Right 9/4/2022    Procedure: PYELOGRAM, RETROGRADE;  Surgeon: Ree Pak MD;  Location: [facility] OR [address] [place] [address];  Service: Urology;  Laterality: Right;     Family History   Problem Relation Age of Onset    Diabetes Mother     Hypertension Mother     Diabetes Father     Hypertension Father      Social History     Tobacco Use    Smoking status: Never    Smokeless tobacco: Never   Substance Use Topics    Alcohol use: No    Drug use: No     Review of Systems   Constitutional:  Negative for chills and fever.   Genitourinary:  Positive for pelvic pain. Negative for difficulty urinating.   All other systems reviewed and are negative.    Physical Exam     Initial Vitals [09/23/22 0545]   BP Pulse Resp Temp " SpO2   (!) 140/80 102 15 97.8 °F (36.6 °C) 97 %      MAP       --         Physical Exam    Nursing note and vitals reviewed.  Constitutional: Vital signs are normal. She appears well-developed and well-nourished. She is not diaphoretic.  Non-toxic appearance. She does not appear ill. No distress.   HENT:   Head: Normocephalic and atraumatic.   Mouth/Throat: Mucous membranes are normal. Mucous membranes are not dry.   Eyes: Conjunctivae and lids are normal.   Neck: Neck supple.   Normal range of motion.  Cardiovascular:  Normal rate.           Genitourinary:    Genitourinary Comments: Stent to pain visible hanging from a urethra     Musculoskeletal:      Cervical back: Normal range of motion and neck supple.     Neurological: She is alert and oriented to person, place, and time.   Skin: Skin is dry and intact. No pallor.   Psychiatric: She has a normal mood and affect. Her speech is normal and behavior is normal.       ED Course   Procedures  Labs Reviewed   SARS-COV-2 RNA AMPLIFICATION, QUAL    Narrative:     Pre procedure          Imaging Results    None          Medications   lactated ringers infusion ( Intravenous New Bag 9/23/22 0805)   sodium chloride 0.9% flush 3 mL (has no administration in time range)   iohexoL (OMNIPAQUE 300) 300 mg iodine/mL injection (has no administration in time range)   ciprofloxacin HCl tablet 500 mg (500 mg Oral Given 9/23/22 0809)     Medical Decision Making:   History:   Old Medical Records: I decided to obtain old medical records.  Initial Assessment:   Displaced ureteral stent  Afebrile otherwise clinically well-appearing  Other:   I have discussed this case with another health care provider.       <> Summary of the Discussion: Urology resident           ED Course as of 09/23/22 1319   Fri Sep 23, 2022   0742 Urology has evaluated the patient.  They plan to he take her to the OR this morning to address her stone and replace her stent.  They requested the patient get her morning  p.o. dose of Cipro while she is here.  They do not in any lab work.  They have made her NPO. [AS]      ED Course User Index  [AS] Jessica Barros MD                 Clinical Impression:   Final diagnoses:  [T83.122A] Displacement of ureteral stent, initial encounter (Primary)  [N20.0] Kidney stone      ED Disposition Condition    Admit Stable                Jessica Barros MD  09/23/22 8416

## 2022-09-23 NOTE — ANESTHESIA PREPROCEDURE EVALUATION
[facility]  Anesthesia Pre-Operative Evaluation         Patient Name: Yenifer Chatterjee  YOB: 1965  MRN: [id number]    SUBJECTIVE:     Pre-operative evaluation for Procedure(s) (LRB):  CYSTOURETEROSCOPY, WITH RETROGRADE PYELOGRAM AND URETERAL STENT INSERTION (Right)  CYSTOURETEROSCOPY,WITH HOLMIUM LASER LITHOTRIPSY OF URETERAL CALCULUS (Right)     09/23/2022    Yenifer Chatterjee is a 56 y.o. female w/ a significant PMHx of urosepsis and ureteral stone s/p R stent placed on 9/4/22. She presented again with stent distal to urethra. She also has associated nausea/vomiting. She has been NPO since 9/22/22 evening.    Patient now presents for the above procedure(s).      LDA:        Peripheral IV - Single Lumen 09/23/22 0807 20 G Left Antecubital (Active)   Site Assessment Clean;Dry;Intact 09/23/22 0807   Line Status Blood return noted;Flushed 09/23/22 0807   Dressing Status Clean;Dry;Intact 09/23/22 0807   Number of days: 0       Female External Urinary Catheter 09/23/22 0831 (Active)   Number of days: 0       Prev airway: 09/04/22; Placement Time: 1811 (created via procedure documentation); Method of Intubation: Video Laryngoscopy; Mask Ventilation: Easy; Intubated: Postinduction; Blade: Burch #3; Airway Device Size: 7.0; Placement Verified By: Capnometry; Complicating Factors: None    Drips:    lactated ringers 125 mL/hr at 09/23/22 0805       Patient Active Problem List   Diagnosis    Right ureteral calculus    Pyelonephritis    Positive blood cultures    Hypoalbuminemia    Other specified anemias       Review of patient's allergies indicates:  No Known Allergies    Current Inpatient Medications:      No current facility-administered medications on file prior to encounter.     Current Outpatient Medications on File Prior to Encounter   Medication Sig Dispense Refill    ciprofloxacin HCl (CIPRO) 500 MG tablet Take 1.5 tablets (750 mg total) by mouth every 12 (twelve) hours. for 9 days  27 tablet 0    ondansetron (ZOFRAN-ODT) 4 MG TbDL Take 1 tablet (4 mg total) by mouth every 6 (six) hours as needed (nausea vomiting). (Patient not taking: No sig reported) 30 tablet 0    oxybutynin (DITROPAN) 5 MG Tab Take 1 tablet (5 mg total) by mouth 3 (three) times daily. (Patient not taking: Reported on 9/22/2022) 90 tablet 0    tamsulosin (FLOMAX) 0.4 mg Cap Take 1 capsule (0.4 mg total) by mouth once daily. for 14 days (Patient not taking: Reported on 9/22/2022) 14 capsule 0       Past Surgical History:   Procedure Laterality Date    CYSTOSCOPY W/ URETERAL STENT PLACEMENT Right 9/4/2022    Procedure: CYSTOSCOPY, WITH URETERAL STENT INSERTION;  Surgeon: Ree Pak MD;  Location: [address];  Service: Urology;  Laterality: Right;    RETROGRADE PYELOGRAPHY Right 9/4/2022    Procedure: PYELOGRAM, RETROGRADE;  Surgeon: Ree Pak MD;  Location: [address];  Service: Urology;  Laterality: Right;       Social History:  Tobacco Use: Low Risk     Smoking Tobacco Use: Never    Smokeless Tobacco Use: Never    Passive Exposure: Not on file      Alcohol Use: Not on file        OBJECTIVE:     Vital Signs Range (Last 24H):  Temp:  [36.6 °C (97.8 °F)]   Pulse:  []   Resp:  [15-16]   BP: (108-140)/(62-80)   SpO2:  [97 %-99 %]       Significant Labs:  Lab Results   Component Value Date    WBC 11.56 09/15/2022    HGB 11.5 (L) 09/15/2022    HCT 34.1 (L) 09/15/2022     09/15/2022    ALT 32 09/15/2022    AST 17 09/15/2022     09/15/2022    K 4.0 09/15/2022     09/15/2022    CREATININE 0.7 09/15/2022    BUN 15 09/15/2022    CO2 23 09/15/2022       Diagnostic Studies: No relevant studies.    EKG:   Results for orders placed or performed during the hospital encounter of 09/10/22   EKG 12-lead    Collection Time: 09/10/22 10:52 PM    Narrative    Test Reason :     Vent. Rate : 090 BPM     Atrial Rate : 090 BPM     P-R Int : 124 ms          QRS Dur : 076 ms      QT Int : 364 ms        P-R-T Axes : 036 031 044 degrees     QTc Int : 445 ms    Normal sinus rhythm  Low anterior forces-cannot exclude Anterior infarct ,age undetermined but  doubt  Abnormal ECG  When compared with ECG of 04-SEP-2022 13:01,  Sinus rhythm has replaced Junctional rhythm  Anterior forces are slightly less  Confirmed by Michele OCHOA MD ([id number]) on 9/11/2022 9:29:51 AM    Referred By: [email or web address]   SELF           Confirmed By:Michele OCHOA MD       2D ECHO:  TTE:  No results found for this or any previous visit.    CRISTI:  No results found for this or any previous visit.    ASSESSMENT/PLAN:           Pre-op Assessment    I have reviewed the Patient Summary Reports.     I have reviewed the Nursing Notes. I have reviewed the NPO Status.   I have reviewed the Medications.     Review of Systems  Anesthesia Hx:  No problems with previous Anesthesia Denies Hx of Anesthetic complications  History of prior surgery of interest to airway management or planning:  Denies Personal Hx of Anesthesia complications.   Social:  Non-Smoker    Hematology/Oncology:  Hematology Normal   Oncology Normal     Cardiovascular:   Hypertension, well controlled Denies MI.  Dysrhythmias   Denies Angina.    Pulmonary:  Pulmonary Normal  Denies COPD.  Denies Asthma.  Denies Shortness of breath. occ bronchitis    Renal/:   Denies Chronic Renal Disease. renal calculi     Hepatic/GI:  Hepatic/GI Normal Denies Liver Disease.    Neurological:  Neurology Normal Denies TIA.  Denies CVA. Denies Seizures.    Endocrine:  Endocrine Normal Denies Diabetes.  Obesity / BMI > 30      Physical Exam  General: Well nourished, Alert and Oriented    Airway:  Mallampati: III   Mouth Opening: Normal  TM Distance: Normal  Tongue: Normal  Neck ROM: Normal ROM    Dental:  Intact    Chest/Lungs:  Clear to auscultation, Normal Respiratory Rate    Heart:  Rate: Normal  Rhythm: Regular Rhythm  Sounds: Normal        Anesthesia Plan  Type of Anesthesia, risks & benefits  discussed:    Anesthesia Type: Gen ETT  Intra-op Monitoring Plan: Standard ASA Monitors  Post Op Pain Control Plan: multimodal analgesia and IV/PO Opioids PRN  Induction:  IV  Airway Plan: Direct, Post-Induction  Informed Consent: Informed consent signed with the Patient and all parties understand the risks and agree with anesthesia plan.  All questions answered.   ASA Score: 2  Day of Surgery Review of History & Physical: H&P Update referred to the surgeon/provider.    Ready For Surgery From Anesthesia Perspective.     .  Date/Time: 9/4/2022 6:11 PM  Performed by: Jose Randolph CRNA  Authorized by: Nasir Tilley Jr., MD      Intubation:     Induction:  Intravenous    Intubated:  Postinduction    Mask Ventilation:  Easy mask    Attempts:  1    Attempted By:  CRNA    Method of Intubation:  Video laryngoscopy    Blade:  Burch 3    Laryngeal View Grade: Grade I - full view of cords      Difficult Airway Encountered?: No      Complications:  None    Airway Device:  Oral endotracheal tube    Airway Device Size:  7.0    Style/Cuff Inflation:  Cuffed    Inflation Amount (mL):  21    Tube secured:  5    Secured at:  The lips    Placement Verified By:  Capnometry and Revisualization with laryngoscopy    Complicating Factors:  None    Findings Post-Intubation:  BS equal bilateral and atraumatic/condition of teeth unchanged

## 2022-09-23 NOTE — HPI
Yenifer Chatterjee is a 56 year old female with a history of urosepsis with klebsiella and ureteral stone s/p right ureteral stent on 9/4/22.      Patient was discharged on 9/7/22 with two weeks of culture appropriate ciprofloxacin, repeat blood cultures were negative at the time of discharge. Urology was notified of a positive result in 1/2 blood cultures. Patient represented on 9/11/22. 9 mm Stone was seen on CT in right renal pelvis with ureteral stent distal to stone and distal coil in the proximal urethra, hydronephrosis resolved.     Patient presented to ED with stent distal to urethra, urology was consulted. No IV, no labs, no imaging at this time.    On assessment, patient is AF and tachycardic to 140 bpm. Denies flank pain, fever, chills. Endorses longterm low back pain, nausea and vomiting x1 on 9/22/22.

## 2022-09-23 NOTE — PLAN OF CARE
Pt doing well and ambulating to bathroom, denies any pain on urination   Referral for Urology ordered, please assist and notify Pt

## 2022-09-23 NOTE — TELEPHONE ENCOUNTER
"Pt states she feels like something is hanging out of her, she is still in pain but concerned do to the "string" that is hanging out.  Care advice states to go to the ED now, pt verbally understands, all questions answered.     Reason for Disposition   Sounds like a serious complication to the triager    Additional Information   Negative: Shock suspected (e.g., cold/pale/clammy skin, too weak to stand, low BP, rapid pulse)   Negative: Sounds like a life-threatening emergency to the triager   Negative: Sounds like a life-threatening emergency to the triager   Negative: [1] Widespread rash AND [2] bright red, sunburn-like   Negative: [1] SEVERE headache AND [2] after spinal (epidural) anesthesia   Negative: [1] Vomiting AND [2] persists > 4 hours   Negative: [1] Vomiting AND [2] abdomen looks much more swollen than usual   Negative: [1] Drinking very little AND [2] dehydration suspected (e.g., no urine > 12 hours, very dry mouth, very lightheaded)   Negative: Patient sounds very sick or weak to the triager    Protocols used: Urinary Symptoms-A-AH, Post-Op Symptoms and Tazllixeg-W-NR    "

## 2022-09-23 NOTE — ANESTHESIA POSTPROCEDURE EVALUATION
Anesthesia Post Evaluation    Patient: Yenifer Chatterjee    Procedure(s) Performed: Procedure(s) (LRB):  URETEROSCOPY (Right)  CYSTOSCOPY (N/A)  PYELOSCOPY (Right)  LITHOTRIPSY, USING LASER (Right)  REMOVAL, CALCULUS, URETER, URETEROSCOPIC (Right)  PYELOGRAM, RETROGRADE (Right)    Final Anesthesia Type: general      Patient location during evaluation: PACU  Patient participation: Yes- Able to Participate  Level of consciousness: awake and alert and oriented  Post-procedure vital signs: reviewed and stable  Pain management: adequate  Airway patency: patent    PONV status at discharge: No PONV  Anesthetic complications: no      Cardiovascular status: stable  Respiratory status: unassisted, spontaneous ventilation and face mask  Hydration status: euvolemic  Follow-up not needed.          Vitals Value Taken Time   /67 09/23/22 1346   Temp 36.7 °C (98.1 °F) 09/23/22 1341   Pulse 71 09/23/22 1350   Resp 14 09/23/22 1350   SpO2 100 % 09/23/22 1350   Vitals shown include unvalidated device data.      No case tracking events are documented in the log.      Pain/Ed Score: Ed Score: 9 (9/23/2022  1:41 PM)

## 2022-09-23 NOTE — OP NOTE
[facility]  Operative Note    Date: 09/23/2022    Pre-Op Diagnosis:   Right ureteral stone  Migrated ureteral stent   Patient Active Problem List    Diagnosis Date Noted    Pyelonephritis 09/11/2022    Positive blood cultures 09/11/2022    Hypoalbuminemia 09/11/2022    Other specified anemias 09/11/2022    Right ureteral calculus 09/04/2022       Post-Op Diagnosis: same    Procedure(s) Performed:   1.  Right ureteroscopy  2.  Cystoscopy  3.  Laser lithotripsy  4.  Pyeloscopy - right  5.  Stone basket extraction  6.  Right RPG  7.  Right ureteral stent exchange  8.  Fluoro < 1 hr    Specimen(s): Stone for analysis    Staff Surgeon:  Woo Ramirez MD     Assistant Surgeon: Chandu Cole MD (TA); Nolberto Mclaughlin MD    Anesthesia: General endotracheal anesthesia    Indications: Yenifer Chattereje is a 56 y.o. female with a right ureteral stone, presenting for definitive stone management.  She currently does have a JJ ureteral stent in place.    Findings:  - Distal coil of stent seen outside of the urethral meatus. Fluoro confirmed location of stent at right UPJ  - Stone encountered in right renal pelvis. Lasered with larger fragments basket extracted  - Right RPG performed to ensure evaluation of all calyces  - Stent placed with strings    Estimated Blood Loss: min    Drains: 6 Fr x 24 cm JJ ureteral stent with strings    Procedure in detail:  After informed consent was obtained, the patient was brought the the cystoscopy suite and placed in the supine position.  SCDs were applied and working.  Anesthesia was administered.  The patient was then placed in the dorsal lithotomy position and prepped and draped in the usual sterile fashion.    The stent was noted to be outside of the urethral meatus. A fluoro image confirmed that the proximal coil was still located in the right renal pelvis. A motion wire was passed through the stent and into the renal pelvis using fluoroscopy to confirm placement.    A rigid  cystoscope in a 22 Fr sheath was introduced into the patient's urethra.  This passed easily.  The entire urethra was visualized which showed no strictures or masses.  Formal cystoscopy was performed which revealed no masses or lesions suspicious for malignancy, no bladder stones, no bladder diverticuli, no trabeculations.  The ureteral orifices were visualized in the normal anatomic position bilaterally with the motion wire in the right UO. A second stiff glide wire was then passed up the right ureteral orifice again confirmed using fluoro.  The cystoscope was removed keeping the wires in place.    A 12/14 ureteral access sheath was then passed over the free wire under fluoroscopic guidance.  Subsequently, the flexible ureteroscope was passed into the patient's ureter through the access sheath under direct vision. Flexible pyeloscopy was performed systematically.  A stone was encountered at the level of renal pelvis.      A 272 micron laser fiber was passed through the ureteroscope.  The stone was fragmented/ dusted using the laser.  The laser fiber was removed and a Nitinol tipless basket was introduced through the ureteroscope.  Stone fragments were removed and collected for specimen analysis.  A retrograde pyelogram was performed through the ureteroscope ensuring that all calices had been evaluated. The ureteroscope was removed keeping the motion wire in place.  The entire course of the ureter was visualized as the ureteroscope and access sheath were simultaneously removed.  There were no significant ureteral fragments left behind.     A 6 Fr x 24 cm JJ ureteral stent with strings was passed over the wire and up into the renal pelvis using fluoro.  When the coil appeared to be in good position in the kidney, the wire was removed under continuous fluoro.  Good coils were seen in the kidney and the bladder using fluoro.      The patient tolerated the procedure well and was transferred to the recovery room in stable  condition.      Disposition:  The patient will follow up with [facility] in 3 months with a renal US. She may remove her stent on Monday 9/26/22.      Chandu Cole MD

## 2022-09-23 NOTE — DISCHARGE SUMMARY
[facility]  Discharge Note  Short Stay    Admit Date: 9/23/2022    Discharge Date and Time: 09/23/2022 1:58 PM      Attending Physician: No att. providers found     Discharge Provider: Chandu Cole MD    Diagnoses:  Active Hospital Problems    Diagnosis  POA    *Right ureteral calculus [N20.1]  Yes      Resolved Hospital Problems   No resolved problems to display.       Discharged Condition: stable    Hospital Course: Patient was admitted for right URS, laser lithotripsy, stone extraction, stent exchange and tolerated the procedure well with no complications. She was discharged home in good condition on the same day.       Final Diagnoses: Same as principal problem.    Disposition: Home or Self Care    Follow up/Patient Instructions:    Medications:  Reconciled Home Medications:   Current Discharge Medication List        CONTINUE these medications which have CHANGED    Details   tamsulosin (FLOMAX) 0.4 mg Cap Take 1 capsule (0.4 mg total) by mouth once daily.  Qty: 30 capsule, Refills: 0           CONTINUE these medications which have NOT CHANGED    Details   ciprofloxacin HCl (CIPRO) 500 MG tablet Take 1.5 tablets (750 mg total) by mouth every 12 (twelve) hours. for 9 days  Qty: 27 tablet, Refills: 0      ondansetron (ZOFRAN-ODT) 4 MG TbDL Take 1 tablet (4 mg total) by mouth every 6 (six) hours as needed (nausea vomiting).  Qty: 30 tablet, Refills: 0      oxybutynin (DITROPAN) 5 MG Tab Take 1 tablet (5 mg total) by mouth 3 (three) times daily.  Qty: 90 tablet, Refills: 0           Discharge Procedure Orders   US Kidney   Standing Status: Future Standing Exp. Date: 09/23/23     Order Specific Question Answer Comments   May the Radiologist modify the order per protocol to meet the clinical needs of the patient? Yes    Release to patient Immediate      Diet Adult Regular     Activity as tolerated      Follow-up Information       Lis Rader NP Follow up in 3 month(s).    Specialties: Urology, Family  Medicine  Why: f/u ureteral stone  Contact information:  [address]  [facility] [address]  [phone]

## 2022-09-23 NOTE — ANESTHESIA PROCEDURE NOTES
Intubation    Date/Time: 9/23/2022 12:33 PM  Performed by: Danica Wiggins CRNA  Authorized by: Danica Wiggins CRNA     Intubation:     Induction:  Intravenous    Intubated:  Postinduction    Mask Ventilation:  Easy with oral airway    Attempts:  1    Attempted By:  CRNA    Method of Intubation:  Video laryngoscopy    Blade:  Burch 3    Laryngeal View Grade: Grade I - full view of cords      Difficult Airway Encountered?: No      Complications:  None    Airway Device:  Oral endotracheal tube    Airway Device Size:  7.5    Style/Cuff Inflation:  Cuffed    Inflation Amount (mL):  8    Tube secured:  22    Secured at:  The lips    Placement Verified By:  Capnometry    Complicating Factors:  None    Findings Post-Intubation:  BS equal bilateral and atraumatic/condition of teeth unchanged

## 2022-09-23 NOTE — PATIENT INSTRUCTIONS
Post Cystoscopy Instructions  Do not strain to have a bowel movement  No strenuous exercise x 7 days  No driving while you are on narcotic pain medications or if your nunez  catheter is in place    You may remove your stent on Monday 9/26/22.    You can expect:  To pass stone fragments if you had a stone procedure  Have pain when you void from your stent if you have a stent in place  See blood in your urine if you have a stent in place    If you have a catheter, please return to the ER if your catheter stops draining or you are having abdominal pain.    Call the doctor if:  Temperature is greater than 101F  Persistent vomiting and inability to keep food down  Inability to void if you do not have a catheter

## 2022-09-23 NOTE — ASSESSMENT & PLAN NOTE
57 yo female with previous urosepsis and right ureteral stent placement. Presented for distal stent migration.    - To OR 9/23/22 for ureteroscopy, stent replacement, laser lithotripsy  - Needs consent  - NPO  - IV Fluids  - Added on  - Anesthesia aware  - Requested ED to place IV and give po Cipro  - Will monitor vitals post op and potentially discharge today

## 2022-09-23 NOTE — CONSULTS
[facility]  Urology  Consult Note    Patient Name: Yenifer Chatterjee  MRN: [id number]  Admission Date: 9/23/2022  Hospital Length of Stay: 0   Code Status: Prior   Attending Provider: Jessica Barros MD   Consulting Provider: Faisal Hylton MD  Primary Care Physician: Primary Doctor No  Principal Problem:<principal problem not specified>    Inpatient consult to Urology  Consult performed by: Faisal Hylton MD  Consult ordered by: Jessica Barros MD  Reason for consult: Stent migration, right UPJ stone        Subjective:     HPI:  Yenifer Chatterjee is a 56 year old female with a history of urosepsis with klebsiella and ureteral stone s/p right ureteral stent on 9/4/22.      Patient was discharged on 9/7/22 with two weeks of culture appropriate ciprofloxacin, repeat blood cultures were negative at the time of discharge. Urology was notified of a positive result in 1/2 blood cultures. Patient represented on 9/11/22. 9 mm Stone was seen on CT in right renal pelvis with ureteral stent distal to stone and distal coil in the proximal urethra, hydronephrosis resolved.     Patient presented to ED with stent distal to urethra, urology was consulted. No IV, no labs, no imaging at this time.    On assessment, patient is AF and tachycardic to 140 bpm. Denies flank pain, fever, chills. Endorses longterm low back pain, nausea and vomiting x1 on 9/22/22.      History reviewed. No pertinent past medical history.    Past Surgical History:   Procedure Laterality Date    CYSTOSCOPY W/ URETERAL STENT PLACEMENT Right 9/4/2022    Procedure: CYSTOSCOPY, WITH URETERAL STENT INSERTION;  Surgeon: Ree Pak MD;  Location: [address];  Service: Urology;  Laterality: Right;    RETROGRADE PYELOGRAPHY Right 9/4/2022    Procedure: PYELOGRAM, RETROGRADE;  Surgeon: Ree Pak MD;  Location: [facility] OR [address] [place] [address];  Service: Urology;  Laterality: Right;       Review of patient's allergies indicates:  No Known  Allergies    Family History       Problem Relation (Age of Onset)    Diabetes Mother, Father    Hypertension Mother, Father            Tobacco Use    Smoking status: Never    Smokeless tobacco: Never   Substance and Sexual Activity    Alcohol use: No    Drug use: No    Sexual activity: Yes     Partners: Male       Review of Systems   Constitutional:  Positive for fatigue. Negative for activity change, chills and fever.   Respiratory:  Negative for cough and shortness of breath.    Cardiovascular:  Negative for chest pain.   Gastrointestinal:  Negative for abdominal pain, diarrhea, nausea and vomiting.   Genitourinary:  Positive for frequency. Negative for difficulty urinating, flank pain, hematuria and urgency.   Neurological:  Negative for dizziness and weakness.     Objective:     Temp:  [97.8 °F (36.6 °C)] 97.8 °F (36.6 °C)  Pulse:  [] 84  Resp:  [15-16] 16  SpO2:  [97 %-98 %] 98 %  BP: (108-140)/(73-80) 138/73     Body mass index is 37.93 kg/m².           Drains       None                   Physical Exam  Vitals reviewed.   Constitutional:       General: She is not in acute distress.     Appearance: She is well-developed. She is not diaphoretic.   HENT:      Head: Normocephalic and atraumatic.   Eyes:      General: No scleral icterus.  Cardiovascular:      Rate and Rhythm: Normal rate.   Pulmonary:      Effort: Pulmonary effort is normal. No respiratory distress.      Breath sounds: No stridor.   Abdominal:      General: There is no distension.      Palpations: Abdomen is soft.      Tenderness: There is no abdominal tenderness. There is no right CVA tenderness or left CVA tenderness.   Genitourinary:     Comments: Ureteral stent distal to meatus with coil  Musculoskeletal:         General: Normal range of motion.      Cervical back: Normal range of motion.   Skin:     General: Skin is warm and dry.   Neurological:      General: No focal deficit present.      Mental Status: She is alert and oriented to  person, place, and time.   Psychiatric:         Mood and Affect: Mood normal.         Behavior: Behavior normal.       Significant Labs:    BMP:  No results for input(s): NA, K, CL, CO2, BUN, CREATININE, LABGLOM, GLUCOSE, CALCIUM in the last 168 hours.    CBC:  No results for input(s): WBC, HGB, HCT, PLT in the last 168 hours.    Urine Studies: No results for input(s): COLORU, APPEARANCEUA, PHUR, SPECGRAV, PROTEINUA, GLUCUA, KETONESU, BILIRUBINUA, OCCULTUA, NITRITE, UROBILINOGEN, LEUKOCYTESUR, RBCUA, WBCUA, BACTERIA, SQUAMEPITHEL, HYALINECASTS in the last 168 hours.    Invalid input(s): WRIGHTSUR  All pertinent labs results from the past 24 hours have been reviewed.    Significant Imaging:  No new imaging. Review per HPI.        Assessment and Plan:     Right ureteral calculus  57 yo female with previous urosepsis and right ureteral stent placement. Presented for distal stent migration.    - To OR 9/23/22 for ureteroscopy, stent replacement, laser lithotripsy  - Needs consent  - NPO  - IV Fluids  - Added on  - Anesthesia aware  - Requested ED to place IV and give po Cipro  - Will monitor vitals post op and potentially discharge today        VTE Risk Mitigation (From admission, onward)    None          Thank you for your consult. I will follow-up with patient. Please contact us if you have any additional questions.    Faisal Hylton MD  Urology  [facility]

## 2022-09-28 LAB
COMPN STONE: NORMAL
SPECIMEN SOURCE: NORMAL
STONE ANALYSIS IR-IMP: NORMAL

## 2022-09-29 ENCOUNTER — OFFICE VISIT (OUTPATIENT)
Dept: INFECTIOUS DISEASES | Facility: CLINIC | Age: 57
End: 2022-09-29
Payer: COMMERCIAL

## 2022-09-29 VITALS
SYSTOLIC BLOOD PRESSURE: 101 MMHG | HEART RATE: 118 BPM | WEIGHT: 228.19 LBS | HEIGHT: 64 IN | DIASTOLIC BLOOD PRESSURE: 70 MMHG | TEMPERATURE: 98 F | BODY MASS INDEX: 38.96 KG/M2

## 2022-09-29 DIAGNOSIS — R78.81 POSITIVE BLOOD CULTURES: ICD-10-CM

## 2022-09-29 DIAGNOSIS — N12 PYELONEPHRITIS: ICD-10-CM

## 2022-09-29 DIAGNOSIS — G47.20 SLEEP PATTERN DISTURBANCE: ICD-10-CM

## 2022-09-29 DIAGNOSIS — N20.1 RIGHT URETERAL CALCULUS: Primary | ICD-10-CM

## 2022-09-29 PROCEDURE — 99213 OFFICE O/P EST LOW 20 MIN: CPT | Mod: S$GLB,,, | Performed by: INTERNAL MEDICINE

## 2022-09-29 PROCEDURE — 99999 PR PBB SHADOW E&M-EST. PATIENT-LVL III: ICD-10-PCS | Mod: PBBFAC,,, | Performed by: INTERNAL MEDICINE

## 2022-09-29 PROCEDURE — 99999 PR PBB SHADOW E&M-EST. PATIENT-LVL III: CPT | Mod: PBBFAC,,, | Performed by: INTERNAL MEDICINE

## 2022-09-29 PROCEDURE — 99213 PR OFFICE/OUTPT VISIT, EST, LEVL III, 20-29 MIN: ICD-10-PCS | Mod: S$GLB,,, | Performed by: INTERNAL MEDICINE

## 2022-09-29 NOTE — PROGRESS NOTES
Patient presents for follow up from UTI, bacteremia on 9/4 and 9/10. Right ureteral stent replaced and stone removed on 9/23. Patient was given cipro for post-operative antibiotics. Patient removed stent on Monday, Sept. 26. No fever, no dysuria. Patient feels tired and that antibiotics are causing stomach problems - no diarrhea, but has constipation since Sept 23. Had BM this morning - normal.   1. Right ureteral calculus    2. Pyelonephritis    3. Positive blood cultures    4. Sleep pattern disturbance        Patient has had antibiotics since 9/3 - no evidence of ongoing infection, and all stones/stents now removed. Will stop antibiotics. Follow up with PCP - she needs a new PCP. FMLA papers filled out in room. Discussed sleep hygiene - she is not sleeping well - probably the cause of her fatigue. She asked for a sleep aid - I refused. Explained how to use melatonin - she will try this with sleep hygiene habits and OTC sleep aids. I spent over 20 minutes on this encounter today.

## 2022-09-30 ENCOUNTER — OFFICE VISIT (OUTPATIENT)
Dept: UROLOGY | Facility: CLINIC | Age: 57
End: 2022-09-30
Payer: COMMERCIAL

## 2022-09-30 VITALS
WEIGHT: 228 LBS | DIASTOLIC BLOOD PRESSURE: 72 MMHG | SYSTOLIC BLOOD PRESSURE: 106 MMHG | HEIGHT: 64 IN | HEART RATE: 99 BPM | BODY MASS INDEX: 38.93 KG/M2

## 2022-09-30 DIAGNOSIS — N20.1 RIGHT URETERAL STONE: Primary | ICD-10-CM

## 2022-09-30 DIAGNOSIS — Z98.890 STATUS POST LASER LITHOTRIPSY OF URETERAL CALCULUS: ICD-10-CM

## 2022-09-30 PROCEDURE — 99024 PR POST-OP FOLLOW-UP VISIT: ICD-10-PCS | Mod: S$GLB,,, | Performed by: NURSE PRACTITIONER

## 2022-09-30 PROCEDURE — 99999 PR PBB SHADOW E&M-EST. PATIENT-LVL III: ICD-10-PCS | Mod: PBBFAC,,, | Performed by: NURSE PRACTITIONER

## 2022-09-30 PROCEDURE — 99999 PR PBB SHADOW E&M-EST. PATIENT-LVL III: CPT | Mod: PBBFAC,,, | Performed by: NURSE PRACTITIONER

## 2022-09-30 PROCEDURE — 99024 POSTOP FOLLOW-UP VISIT: CPT | Mod: S$GLB,,, | Performed by: NURSE PRACTITIONER

## 2022-09-30 NOTE — PROGRESS NOTES
CHIEF COMPLAINT:    Mrs. Chatterjee is a 56 y.o. female presenting for   Chief Complaint   Patient presents with    Follow-up     Stent removal on Monday night pt states      .  PRESENTING ILLNESS:    Yenifer Chatterjee is a 56 y.o. female with a PMH of *** who presents for ***.     She reports a good urinary stream and complete bladder emptying***.  Urine cultures: ***    REVIEW OF SYSTEMS:    ROS     PATIENT HISTORY:    History reviewed. No pertinent past medical history.    Family History   Problem Relation Age of Onset    Diabetes Mother     Hypertension Mother     Diabetes Father     Hypertension Father        Allergies:  Patient has no known allergies.    Medications:    Current Outpatient Medications:     tamsulosin (FLOMAX) 0.4 mg Cap, Take 1 capsule (0.4 mg total) by mouth once daily., Disp: 30 capsule, Rfl: 0    ciprofloxacin HCl (CIPRO) 500 MG tablet, Take 1.5 tablets (750 mg total) by mouth every 12 (twelve) hours. for 9 days, Disp: 27 tablet, Rfl: 0    ondansetron (ZOFRAN-ODT) 4 MG TbDL, Take 1 tablet (4 mg total) by mouth every 6 (six) hours as needed (nausea vomiting). (Patient not taking: No sig reported), Disp: 30 tablet, Rfl: 0    oxybutynin (DITROPAN) 5 MG Tab, Take 1 tablet (5 mg total) by mouth 3 (three) times daily. (Patient not taking: No sig reported), Disp: 90 tablet, Rfl: 0    PHYSICAL EXAMINATION:  ***    Physical Exam      LABS:    U/a dipstick performed in office today: ***    IMPRESSION:    No diagnosis found.    PLAN:    Problem List Items Addressed This Visit    None      1. ***    Stefani Faria NP

## 2022-09-30 NOTE — PROGRESS NOTES
CHIEF COMPLAINT:    Mrs. Chatterjee is a 56 y.o. female presenting for   Chief Complaint   Patient presents with    Follow-up     Stent removal on Monday night pt states      .  PRESENTING ILLNESS:    Yenifer Chatterjee is a 56 y.o. female with a PMH of ureteral stone, hypoalbuminemia who presents for post op.     Established patient of urology department. Presents today for post op check up.  S/P ureteroscopy with Dr. Ramirez 9/23/22.  States pulled stent at home on Monday without issue.  Reports stent intact at time of removal.  She reports a good urinary stream and complete bladder emptying.  Has no urinary complaints today.    Had [facility] paperwork to be filler. Advised to send paperwork to medical records department.    REVIEW OF SYSTEMS:    Review of Systems   Constitutional:  Negative for chills and fever.   Respiratory:  Negative for shortness of breath.    Cardiovascular:  Negative for chest pain.   Gastrointestinal:  Negative for constipation and diarrhea.   Genitourinary:  Negative for dysuria, flank pain, frequency, hematuria and urgency.   Neurological:  Negative for dizziness and weakness.      PATIENT HISTORY:    History reviewed. No pertinent past medical history.    Family History   Problem Relation Age of Onset    Diabetes Mother     Hypertension Mother     Diabetes Father     Hypertension Father        Allergies:  Patient has no known allergies.    Medications:    Current Outpatient Medications:     tamsulosin (FLOMAX) 0.4 mg Cap, Take 1 capsule (0.4 mg total) by mouth once daily., Disp: 30 capsule, Rfl: 0    ciprofloxacin HCl (CIPRO) 500 MG tablet, Take 1.5 tablets (750 mg total) by mouth every 12 (twelve) hours. for 9 days, Disp: 27 tablet, Rfl: 0    ondansetron (ZOFRAN-ODT) 4 MG TbDL, Take 1 tablet (4 mg total) by mouth every 6 (six) hours as needed (nausea vomiting). (Patient not taking: No sig reported), Disp: 30 tablet, Rfl: 0    oxybutynin (DITROPAN) 5 MG Tab, Take 1 tablet (5 mg total) by mouth 3  (three) times daily. (Patient not taking: No sig reported), Disp: 90 tablet, Rfl: 0    PHYSICAL EXAMINATION:    Physical Exam  Vitals and nursing note reviewed.   Constitutional:       Appearance: Normal appearance. She is well-developed.   HENT:      Head: Normocephalic and atraumatic.   Eyes:      Pupils: Pupils are equal, round, and reactive to light.   Pulmonary:      Effort: Pulmonary effort is normal.   Musculoskeletal:         General: Normal range of motion.      Cervical back: Normal range of motion.   Skin:     General: Skin is warm and dry.   Neurological:      Mental Status: She is alert and oriented to person, place, and time.   Psychiatric:         Behavior: Behavior normal.         LABS:      IMPRESSION:    Encounter Diagnoses   Name Primary?    Right ureteral stone Yes    Status post laser lithotripsy of ureteral calculus        PLAN:    Problem List Items Addressed This Visit    None  Visit Diagnoses       Right ureteral stone    -  Primary    Status post laser lithotripsy of ureteral calculus                1. S/p lithotripsy   Progressing well   Stent removed  2.  Rtc for f/u with u/s prior to visit    Stefani Faria NP

## 2022-10-26 ENCOUNTER — PATIENT MESSAGE (OUTPATIENT)
Dept: RESEARCH | Facility: HOSPITAL | Age: 57
End: 2022-10-26
Payer: COMMERCIAL

## 2022-11-04 ENCOUNTER — PATIENT OUTREACH (OUTPATIENT)
Dept: ADMINISTRATIVE | Facility: OTHER | Age: 57
End: 2022-11-04
Payer: COMMERCIAL

## 2022-11-04 NOTE — PROGRESS NOTES
CHW - Case Closure    This Community Health Worker spoke to patient via telephone today.   Pt/Caregiver reported: Pt stated she does not need any assistance/resources at this time  Pt/Caregiver denied any additional needs at this time and agrees with episode closure at this time.  Provided patient with Community Health Worker's contact information and encouraged him/her to contact this Community Health Worker if additional needs arise.

## 2022-11-04 NOTE — PROGRESS NOTES
CHW - Initial Contact    This Community Health Worker updated the Social Determinant of Health questionnaire with patient via telephone today.    Pt identified barriers of most importance are: None  Referrals to community agencies completed with patient/caregiver consent outside of [facility] include: None  Referrals were put through [facility] - No  Support and Services: No support & services have been documented.  Other information discussed the patient needs / wants help with: None  Follow up required: No, Pt stated she does not need any assistance/resources at this time  No future outreach task assigned

## 2024-03-16 ENCOUNTER — HOSPITAL ENCOUNTER (INPATIENT)
Facility: HOSPITAL | Age: 59
LOS: 3 days | Discharge: HOME HEALTH | DRG: 872 | End: 2024-03-19
Attending: STUDENT IN AN ORGANIZED HEALTH CARE EDUCATION/TRAINING PROGRAM | Admitting: STUDENT IN AN ORGANIZED HEALTH CARE EDUCATION/TRAINING PROGRAM
Payer: COMMERCIAL

## 2024-03-16 DIAGNOSIS — R11.2 NAUSEA VOMITING AND DIARRHEA: ICD-10-CM

## 2024-03-16 DIAGNOSIS — R19.7 NAUSEA VOMITING AND DIARRHEA: ICD-10-CM

## 2024-03-16 DIAGNOSIS — R78.81 BACTEREMIA: Primary | ICD-10-CM

## 2024-03-16 DIAGNOSIS — R65.20 SEVERE SEPSIS: ICD-10-CM

## 2024-03-16 DIAGNOSIS — I95.9 HYPOTENSION: ICD-10-CM

## 2024-03-16 DIAGNOSIS — A41.9 SEVERE SEPSIS: ICD-10-CM

## 2024-03-16 DIAGNOSIS — E86.0 DEHYDRATION: ICD-10-CM

## 2024-03-16 DIAGNOSIS — I44.7 INCOMPLETE LEFT BUNDLE BRANCH BLOCK: ICD-10-CM

## 2024-03-16 DIAGNOSIS — A41.9 SEPSIS: ICD-10-CM

## 2024-03-16 PROBLEM — K52.9 ACUTE GASTROENTERITIS: Status: ACTIVE | Noted: 2024-03-16

## 2024-03-16 PROBLEM — E87.20 LACTIC ACIDOSIS: Status: ACTIVE | Noted: 2024-03-16

## 2024-03-16 PROBLEM — R94.31 ABNORMAL EKG: Status: ACTIVE | Noted: 2024-03-16

## 2024-03-16 LAB
ACINETOBACTER CALCOACETICUS/BAUMANNII COMPLEX: NOT DETECTED
ALBUMIN SERPL BCP-MCNC: 4 G/DL (ref 3.5–5.2)
ALLENS TEST: ABNORMAL
ALP SERPL-CCNC: 105 U/L (ref 55–135)
ALT SERPL W/O P-5'-P-CCNC: 16 U/L (ref 10–44)
ANION GAP SERPL CALC-SCNC: 12 MMOL/L (ref 8–16)
AST SERPL-CCNC: 13 U/L (ref 10–40)
BACTERIA #/AREA URNS HPF: ABNORMAL /HPF
BACTEROIDES FRAGILIS: NOT DETECTED
BASOPHILS # BLD AUTO: 0.01 K/UL (ref 0–0.2)
BASOPHILS NFR BLD: 0.2 % (ref 0–1.9)
BILIRUB SERPL-MCNC: 0.7 MG/DL (ref 0.1–1)
BILIRUB UR QL STRIP: NEGATIVE
BNP SERPL-MCNC: 68 PG/ML (ref 0–99)
BUN SERPL-MCNC: 26 MG/DL (ref 6–20)
CALCIUM SERPL-MCNC: 9.5 MG/DL (ref 8.7–10.5)
CANDIDA ALBICANS: NOT DETECTED
CANDIDA AURIS: NOT DETECTED
CANDIDA GLABRATA: NOT DETECTED
CANDIDA KRUSEI: NOT DETECTED
CANDIDA PARAPSILOSIS: NOT DETECTED
CANDIDA TROPICALIS: NOT DETECTED
CHLORIDE SERPL-SCNC: 109 MMOL/L (ref 95–110)
CLARITY UR: CLEAR
CO2 SERPL-SCNC: 21 MMOL/L (ref 23–29)
COLOR UR: COLORLESS
CREAT SERPL-MCNC: 0.8 MG/DL (ref 0.5–1.4)
CRP SERPL-MCNC: 11 MG/L (ref 0–8.2)
CRYPTOCOCCUS NEOFORMANS/GATTII: NOT DETECTED
CTX-M GENE (ESBL PRODUCER): DETECTED
DELSYS: ABNORMAL
DIFFERENTIAL METHOD BLD: ABNORMAL
ENTEROBACTER CLOACAE COMPLEX: NOT DETECTED
ENTEROBACTERALES: ABNORMAL
ENTEROCOCCUS FAECALIS: NOT DETECTED
ENTEROCOCCUS FAECIUM: NOT DETECTED
EOSINOPHIL # BLD AUTO: 0 K/UL (ref 0–0.5)
EOSINOPHIL NFR BLD: 0.3 % (ref 0–8)
ERYTHROCYTE [DISTWIDTH] IN BLOOD BY AUTOMATED COUNT: 12.6 % (ref 11.5–14.5)
ESCHERICHIA COLI: DETECTED
EST. GFR  (NO RACE VARIABLE): >60 ML/MIN/1.73 M^2
GLUCOSE SERPL-MCNC: 105 MG/DL (ref 70–110)
GLUCOSE UR QL STRIP: NEGATIVE
HAEMOPHILUS INFLUENZAE: NOT DETECTED
HCT VFR BLD AUTO: 41.1 % (ref 37–48.5)
HGB BLD-MCNC: 13.5 G/DL (ref 12–16)
HGB UR QL STRIP: NEGATIVE
IMM GRANULOCYTES # BLD AUTO: 0.03 K/UL (ref 0–0.04)
IMM GRANULOCYTES NFR BLD AUTO: 0.5 % (ref 0–0.5)
IMP GENE (CARBAPENEM RESISTANT): NOT DETECTED
KETONES UR QL STRIP: NEGATIVE
KLEBSIELLA AEROGENES: NOT DETECTED
KLEBSIELLA OXYTOCA: NOT DETECTED
KLEBSIELLA PNEUMONIAE GROUP: NOT DETECTED
KPC RESISTANCE GENE (CARBAPENEM): NOT DETECTED
LACTATE SERPL-SCNC: 2.2 MMOL/L (ref 0.5–2.2)
LACTATE SERPL-SCNC: 3.2 MMOL/L (ref 0.5–2.2)
LDH SERPL L TO P-CCNC: 3.39 MMOL/L (ref 0.5–2.2)
LEUKOCYTE ESTERASE UR QL STRIP: ABNORMAL
LISTERIA MONOCYTOGENES: NOT DETECTED
LYMPHOCYTES # BLD AUTO: 0.5 K/UL (ref 1–4.8)
LYMPHOCYTES NFR BLD: 7.2 % (ref 18–48)
MCH RBC QN AUTO: 31.4 PG (ref 27–31)
MCHC RBC AUTO-ENTMCNC: 32.8 G/DL (ref 32–36)
MCR-1: NOT DETECTED
MCV RBC AUTO: 96 FL (ref 82–98)
MEC A/C AND MREJ (MRSA): ABNORMAL
MEC A/C: ABNORMAL
MICROSCOPIC COMMENT: ABNORMAL
MODE: ABNORMAL
MONOCYTES # BLD AUTO: 0 K/UL (ref 0.3–1)
MONOCYTES NFR BLD: 0.5 % (ref 4–15)
NDM GENE (CARBAPENEM RESISTANT): NOT DETECTED
NEISSERIA MENINGITIDIS: NOT DETECTED
NEUTROPHILS # BLD AUTO: 5.7 K/UL (ref 1.8–7.7)
NEUTROPHILS NFR BLD: 91.3 % (ref 38–73)
NITRITE UR QL STRIP: NEGATIVE
NRBC BLD-RTO: 0 /100 WBC
OXA-48-LIKE (CARBAPENEM RESISTANT): NOT DETECTED
PH UR STRIP: 6 [PH] (ref 5–8)
PLATELET # BLD AUTO: 163 K/UL (ref 150–450)
PMV BLD AUTO: 11 FL (ref 9.2–12.9)
POTASSIUM SERPL-SCNC: 4.2 MMOL/L (ref 3.5–5.1)
PROT SERPL-MCNC: 7.1 G/DL (ref 6–8.4)
PROT UR QL STRIP: NEGATIVE
PROTEUS SPECIES: NOT DETECTED
PSEUDOMONAS AERUGINOSA: NOT DETECTED
RBC # BLD AUTO: 4.3 M/UL (ref 4–5.4)
RBC #/AREA URNS HPF: 2 /HPF (ref 0–4)
SALMONELLA SP: NOT DETECTED
SAMPLE: ABNORMAL
SARS-COV-2 RDRP RESP QL NAA+PROBE: NEGATIVE
SERRATIA MARCESCENS: NOT DETECTED
SITE: ABNORMAL
SODIUM SERPL-SCNC: 142 MMOL/L (ref 136–145)
SP GR UR STRIP: 1.01 (ref 1–1.03)
SQUAMOUS #/AREA URNS HPF: 0 /HPF
STAPHYLOCOCCUS AUREUS: NOT DETECTED
STAPHYLOCOCCUS EPIDERMIDIS: NOT DETECTED
STAPHYLOCOCCUS LUGDUNESIS: NOT DETECTED
STAPHYLOCOCCUS SPECIES: NOT DETECTED
STENOTROPHOMONAS MALTOPHILIA: NOT DETECTED
STREPTOCOCCUS AGALACTIAE: NOT DETECTED
STREPTOCOCCUS PNEUMONIAE: NOT DETECTED
STREPTOCOCCUS PYOGENES: NOT DETECTED
STREPTOCOCCUS SPECIES: NOT DETECTED
TROPONIN I SERPL DL<=0.01 NG/ML-MCNC: <0.006 NG/ML (ref 0–0.03)
URN SPEC COLLECT METH UR: ABNORMAL
UROBILINOGEN UR STRIP-ACNC: NEGATIVE EU/DL
VAN A/B (VRE GENE): ABNORMAL
VIM GENE (CARBAPENEM RESISTANT): NOT DETECTED
WBC # BLD AUTO: 6.24 K/UL (ref 3.9–12.7)
WBC #/AREA URNS HPF: 10 /HPF (ref 0–5)

## 2024-03-16 PROCEDURE — 63600175 PHARM REV CODE 636 W HCPCS: Performed by: EMERGENCY MEDICINE

## 2024-03-16 PROCEDURE — 83605 ASSAY OF LACTIC ACID: CPT | Performed by: STUDENT IN AN ORGANIZED HEALTH CARE EDUCATION/TRAINING PROGRAM

## 2024-03-16 PROCEDURE — 99285 EMERGENCY DEPT VISIT HI MDM: CPT | Mod: 25

## 2024-03-16 PROCEDURE — 83605 ASSAY OF LACTIC ACID: CPT

## 2024-03-16 PROCEDURE — 87040 BLOOD CULTURE FOR BACTERIA: CPT | Mod: 59 | Performed by: STUDENT IN AN ORGANIZED HEALTH CARE EDUCATION/TRAINING PROGRAM

## 2024-03-16 PROCEDURE — 83880 ASSAY OF NATRIURETIC PEPTIDE: CPT | Performed by: EMERGENCY MEDICINE

## 2024-03-16 PROCEDURE — 36415 COLL VENOUS BLD VENIPUNCTURE: CPT | Performed by: EMERGENCY MEDICINE

## 2024-03-16 PROCEDURE — 36415 COLL VENOUS BLD VENIPUNCTURE: CPT | Performed by: STUDENT IN AN ORGANIZED HEALTH CARE EDUCATION/TRAINING PROGRAM

## 2024-03-16 PROCEDURE — 87077 CULTURE AEROBIC IDENTIFY: CPT | Performed by: STUDENT IN AN ORGANIZED HEALTH CARE EDUCATION/TRAINING PROGRAM

## 2024-03-16 PROCEDURE — 63600175 PHARM REV CODE 636 W HCPCS: Mod: JZ,JG | Performed by: NURSE PRACTITIONER

## 2024-03-16 PROCEDURE — 94761 N-INVAS EAR/PLS OXIMETRY MLT: CPT

## 2024-03-16 PROCEDURE — 36415 COLL VENOUS BLD VENIPUNCTURE: CPT | Performed by: NURSE PRACTITIONER

## 2024-03-16 PROCEDURE — U0002 COVID-19 LAB TEST NON-CDC: HCPCS | Performed by: EMERGENCY MEDICINE

## 2024-03-16 PROCEDURE — 83605 ASSAY OF LACTIC ACID: CPT | Mod: 91 | Performed by: STUDENT IN AN ORGANIZED HEALTH CARE EDUCATION/TRAINING PROGRAM

## 2024-03-16 PROCEDURE — 25000003 PHARM REV CODE 250

## 2024-03-16 PROCEDURE — 96361 HYDRATE IV INFUSION ADD-ON: CPT

## 2024-03-16 PROCEDURE — 25500020 PHARM REV CODE 255

## 2024-03-16 PROCEDURE — 84484 ASSAY OF TROPONIN QUANT: CPT | Performed by: EMERGENCY MEDICINE

## 2024-03-16 PROCEDURE — 96376 TX/PRO/DX INJ SAME DRUG ADON: CPT

## 2024-03-16 PROCEDURE — 99900035 HC TECH TIME PER 15 MIN (STAT)

## 2024-03-16 PROCEDURE — 96375 TX/PRO/DX INJ NEW DRUG ADDON: CPT

## 2024-03-16 PROCEDURE — 96367 TX/PROPH/DG ADDL SEQ IV INF: CPT

## 2024-03-16 PROCEDURE — 85025 COMPLETE CBC W/AUTO DIFF WBC: CPT | Performed by: STUDENT IN AN ORGANIZED HEALTH CARE EDUCATION/TRAINING PROGRAM

## 2024-03-16 PROCEDURE — 11000001 HC ACUTE MED/SURG PRIVATE ROOM

## 2024-03-16 PROCEDURE — 63600175 PHARM REV CODE 636 W HCPCS: Performed by: STUDENT IN AN ORGANIZED HEALTH CARE EDUCATION/TRAINING PROGRAM

## 2024-03-16 PROCEDURE — 25000003 PHARM REV CODE 250: Performed by: EMERGENCY MEDICINE

## 2024-03-16 PROCEDURE — 80053 COMPREHEN METABOLIC PANEL: CPT | Performed by: STUDENT IN AN ORGANIZED HEALTH CARE EDUCATION/TRAINING PROGRAM

## 2024-03-16 PROCEDURE — 93010 ELECTROCARDIOGRAM REPORT: CPT | Mod: ,,, | Performed by: GENERAL PRACTICE

## 2024-03-16 PROCEDURE — 25000003 PHARM REV CODE 250: Performed by: NURSE PRACTITIONER

## 2024-03-16 PROCEDURE — 81000 URINALYSIS NONAUTO W/SCOPE: CPT | Performed by: STUDENT IN AN ORGANIZED HEALTH CARE EDUCATION/TRAINING PROGRAM

## 2024-03-16 PROCEDURE — 93005 ELECTROCARDIOGRAM TRACING: CPT

## 2024-03-16 PROCEDURE — 86140 C-REACTIVE PROTEIN: CPT | Performed by: NURSE PRACTITIONER

## 2024-03-16 PROCEDURE — 96365 THER/PROPH/DIAG IV INF INIT: CPT

## 2024-03-16 PROCEDURE — 87154 CUL TYP ID BLD PTHGN 6+ TRGT: CPT | Performed by: STUDENT IN AN ORGANIZED HEALTH CARE EDUCATION/TRAINING PROGRAM

## 2024-03-16 PROCEDURE — 87186 SC STD MICRODIL/AGAR DIL: CPT | Performed by: STUDENT IN AN ORGANIZED HEALTH CARE EDUCATION/TRAINING PROGRAM

## 2024-03-16 PROCEDURE — 25000003 PHARM REV CODE 250: Performed by: STUDENT IN AN ORGANIZED HEALTH CARE EDUCATION/TRAINING PROGRAM

## 2024-03-16 RX ORDER — ACETAMINOPHEN 500 MG
1000 TABLET ORAL
Status: COMPLETED | OUTPATIENT
Start: 2024-03-16 | End: 2024-03-16

## 2024-03-16 RX ORDER — HYDROMORPHONE HYDROCHLORIDE 1 MG/ML
0.5 INJECTION, SOLUTION INTRAMUSCULAR; INTRAVENOUS; SUBCUTANEOUS ONCE
Status: DISCONTINUED | OUTPATIENT
Start: 2024-03-17 | End: 2024-03-19 | Stop reason: HOSPADM

## 2024-03-16 RX ORDER — ONDANSETRON 4 MG/1
8 TABLET, ORALLY DISINTEGRATING ORAL EVERY 8 HOURS PRN
Status: DISCONTINUED | OUTPATIENT
Start: 2024-03-16 | End: 2024-03-19 | Stop reason: HOSPADM

## 2024-03-16 RX ORDER — FLUTICASONE PROPIONATE 50 MCG
2 SPRAY, SUSPENSION (ML) NASAL DAILY
COMMUNITY

## 2024-03-16 RX ORDER — ONDANSETRON HYDROCHLORIDE 2 MG/ML
4 INJECTION, SOLUTION INTRAVENOUS
Status: COMPLETED | OUTPATIENT
Start: 2024-03-16 | End: 2024-03-16

## 2024-03-16 RX ORDER — PROCHLORPERAZINE EDISYLATE 5 MG/ML
5 INJECTION INTRAMUSCULAR; INTRAVENOUS EVERY 6 HOURS PRN
Status: DISCONTINUED | OUTPATIENT
Start: 2024-03-16 | End: 2024-03-19 | Stop reason: HOSPADM

## 2024-03-16 RX ORDER — METRONIDAZOLE 500 MG/100ML
500 INJECTION, SOLUTION INTRAVENOUS EVERY 8 HOURS
Status: DISCONTINUED | OUTPATIENT
Start: 2024-03-16 | End: 2024-03-17

## 2024-03-16 RX ORDER — ACETAMINOPHEN 325 MG/1
650 TABLET ORAL EVERY 4 HOURS PRN
Status: DISCONTINUED | OUTPATIENT
Start: 2024-03-16 | End: 2024-03-19 | Stop reason: HOSPADM

## 2024-03-16 RX ORDER — TALC
6 POWDER (GRAM) TOPICAL NIGHTLY PRN
Status: DISCONTINUED | OUTPATIENT
Start: 2024-03-16 | End: 2024-03-19 | Stop reason: HOSPADM

## 2024-03-16 RX ORDER — KETOROLAC TROMETHAMINE 30 MG/ML
10 INJECTION, SOLUTION INTRAMUSCULAR; INTRAVENOUS
Status: COMPLETED | OUTPATIENT
Start: 2024-03-16 | End: 2024-03-16

## 2024-03-16 RX ORDER — HYOSCYAMINE SULFATE 0.5 MG/ML
0.5 INJECTION, SOLUTION SUBCUTANEOUS
Status: COMPLETED | OUTPATIENT
Start: 2024-03-16 | End: 2024-03-16

## 2024-03-16 RX ADMIN — HYOSCYAMINE SULFATE 0.5 MG: 0.5 INJECTION, SOLUTION SUBCUTANEOUS at 01:03

## 2024-03-16 RX ADMIN — ACETAMINOPHEN 650 MG: 325 TABLET ORAL at 08:03

## 2024-03-16 RX ADMIN — SODIUM CHLORIDE, POTASSIUM CHLORIDE, SODIUM LACTATE AND CALCIUM CHLORIDE 2000 ML: 600; 310; 30; 20 INJECTION, SOLUTION INTRAVENOUS at 08:03

## 2024-03-16 RX ADMIN — ONDANSETRON 4 MG: 2 INJECTION INTRAMUSCULAR; INTRAVENOUS at 06:03

## 2024-03-16 RX ADMIN — METRONIDAZOLE 500 MG: 5 INJECTION, SOLUTION INTRAVENOUS at 02:03

## 2024-03-16 RX ADMIN — SODIUM CHLORIDE, POTASSIUM CHLORIDE, SODIUM LACTATE AND CALCIUM CHLORIDE 1000 ML: 600; 310; 30; 20 INJECTION, SOLUTION INTRAVENOUS at 12:03

## 2024-03-16 RX ADMIN — CEFTRIAXONE SODIUM 1 G: 1 INJECTION, POWDER, FOR SOLUTION INTRAMUSCULAR; INTRAVENOUS at 06:03

## 2024-03-16 RX ADMIN — IOHEXOL 100 ML: 350 INJECTION, SOLUTION INTRAVENOUS at 07:03

## 2024-03-16 RX ADMIN — MELATONIN TAB 3 MG 6 MG: 3 TAB at 08:03

## 2024-03-16 RX ADMIN — SODIUM CHLORIDE 500 ML: 9 INJECTION, SOLUTION INTRAVENOUS at 11:03

## 2024-03-16 RX ADMIN — ACETAMINOPHEN 1000 MG: 500 TABLET ORAL at 08:03

## 2024-03-16 RX ADMIN — ONDANSETRON 8 MG: 4 TABLET, ORALLY DISINTEGRATING ORAL at 07:03

## 2024-03-16 RX ADMIN — METRONIDAZOLE 500 MG: 5 INJECTION, SOLUTION INTRAVENOUS at 10:03

## 2024-03-16 RX ADMIN — KETOROLAC TROMETHAMINE 10 MG: 30 INJECTION, SOLUTION INTRAMUSCULAR at 06:03

## 2024-03-16 RX ADMIN — ONDANSETRON 4 MG: 2 INJECTION INTRAMUSCULAR; INTRAVENOUS at 08:03

## 2024-03-16 RX ADMIN — ACETAMINOPHEN 650 MG: 325 TABLET ORAL at 02:03

## 2024-03-16 NOTE — H&P
[facility]  History & Physical    Patient Name: Yenifer Chatterjee  MRN: [id number]  Patient Class: IP- Inpatient  Admission Date: 3/16/2024  Attending Physician: Kb Tee MD   Primary Care Provider: [facility]         Patient information was obtained from patient and ER records.     Subjective:     Principal Problem:Sepsis    Chief Complaint:   Chief Complaint   Patient presents with    General Illness     Woke up with nausea, vomiting and diarrhea. Also back pain and pain in uterus area.         HPI: Ms. Chatterjee is a 58-year-old female with a past medical history of nephrolithiasis.  She was in her usual state of health when she started having abdominal pain, nausea/vomiting, and diarrhea.  The pain got progressively worse and diffuse to her back/trunk in uterus area.  She was concerned about having recurrent infection and kidney stone so she came to the hospital.  While in the ED, she had a CT of the abdomen/pelvis with IV contrast and it was negative.  She met sepsis criteria and was given IV Rocephin for possible UTI.  Her lactic acid came back elevated and she also was mildly hypotensive and bolused with IV NS.  She is being admitted to the hospital for further treatment and management.  She currently reports her nausea and abdominal pain are much better.  Still having right lower quadrant tenderness.  She report it is uterus pain. No diarrhea since being in the hospital.  Complains of having headache currently.    No past medical history on file.    Past Surgical History:   Procedure Laterality Date    CYSTOSCOPY N/A 9/23/2022    Procedure: CYSTOSCOPY;  Surgeon: Woo Ramirez MD;  Location: [facility] OR [address] [place] [address];  Service: Urology;  Laterality: N/A;    CYSTOSCOPY W/ URETERAL STENT PLACEMENT Right 9/4/2022    Procedure: CYSTOSCOPY, WITH URETERAL STENT INSERTION;  Surgeon: Ree Pak MD;  Location: [facility] OR [address] [place] [address];  Service: Urology;  Laterality: Right;    LASER  LITHOTRIPSY Right 9/23/2022    Procedure: LITHOTRIPSY, USING LASER;  Surgeon: Woo Ramirez MD;  [facility];  Service: Urology;  Laterality: Right;    PYELOSCOPY Right 9/23/2022    Procedure: PYELOSCOPY;  Surgeon: Woo Ramirez MD;  [facility];  Service: Urology;  Laterality: Right;    RETROGRADE PYELOGRAPHY Right 9/4/2022    Procedure: PYELOGRAM, RETROGRADE;  Surgeon: Ree Pak MD;  [facility];  Service: Urology;  Laterality: Right;    RETROGRADE PYELOGRAPHY Right 9/23/2022    Procedure: PYELOGRAM, RETROGRADE;  Surgeon: Woo Ramirez MD;  [facility];  Service: Urology;  Laterality: Right;    URETEROSCOPIC REMOVAL OF URETERIC CALCULUS Right 9/23/2022    Procedure: REMOVAL, CALCULUS, URETER, URETEROSCOPIC;  Surgeon: Woo Ramirez MD;  [facility];  Service: Urology;  Laterality: Right;    URETEROSCOPY Right 9/23/2022    Procedure: URETEROSCOPY;  Surgeon: Woo Ramirez MD;  [facility];  Service: Urology;  Laterality: Right;       Review of patient's allergies indicates:  No Known Allergies    No current facility-administered medications on file prior to encounter.     Current Outpatient Medications on File Prior to Encounter   Medication Sig    ondansetron (ZOFRAN-ODT) 4 MG TbDL Take 1 tablet (4 mg total) by mouth every 6 (six) hours as needed (nausea vomiting). (Patient not taking: No sig reported)    oxybutynin (DITROPAN) 5 MG Tab Take 1 tablet (5 mg total) by mouth 3 (three) times daily. (Patient not taking: No sig reported)    tamsulosin (FLOMAX) 0.4 mg Cap Take 1 capsule (0.4 mg total) by mouth once daily.     Family History       Problem Relation (Age of Onset)    Diabetes Mother, Father    Hypertension Mother, Father          Tobacco Use    Smoking status: Never    Smokeless tobacco: Never   Substance and Sexual Activity    Alcohol use: No    Drug use: No    Sexual activity: Yes     Partners: Male      Review of Systems   Constitutional:  Negative for appetite change, chills, fatigue and fever.   HENT:  Negative for congestion, hearing loss, sore throat and trouble swallowing.    Respiratory:  Negative for chest tightness and shortness of breath.    Cardiovascular:  Negative for chest pain, palpitations and leg swelling.   Gastrointestinal:  Positive for abdominal pain, diarrhea, nausea and vomiting. Negative for abdominal distention, blood in stool and constipation.   Genitourinary:  Negative for decreased urine volume, dysuria and hematuria.   Musculoskeletal:  Negative for arthralgias, back pain and gait problem.   Skin:  Negative for color change and wound.   Neurological:  Positive for headaches. Negative for dizziness, tremors, speech difficulty, weakness and numbness.   Psychiatric/Behavioral:  Negative for agitation, confusion, hallucinations and sleep disturbance.      Objective:     Vital Signs (Most Recent):  Temp: 99 °F (37.2 °C) (03/16/24 0703)  Pulse: (!) 114 (03/16/24 1332)  Resp: 18 (03/16/24 0703)  BP: (!) 96/54 (03/16/24 1332)  SpO2: 99 % (03/16/24 1332) Vital Signs (24h Range):  Temp:  [98.2 °F (36.8 °C)-99 °F (37.2 °C)] 99 °F (37.2 °C)  Pulse:  [105-116] 114  Resp:  [18-20] 18  SpO2:  [97 %-100 %] 99 %  BP: ()/(41-63) 96/54     Weight: 106.6 kg (235 lb)  Body mass index is 40.34 kg/m².     Physical Exam  Constitutional:       General: She is not in acute distress.     Appearance: Normal appearance.   HENT:      Head: Normocephalic and atraumatic.      Nose: Nose normal. No congestion.      Mouth/Throat:      Mouth: Mucous membranes are moist.      Pharynx: Oropharynx is clear.   Eyes:      Extraocular Movements: Extraocular movements intact.      Conjunctiva/sclera: Conjunctivae normal.      Pupils: Pupils are equal, round, and reactive to light.   Cardiovascular:      Rate and Rhythm: Normal rate and regular rhythm.      Pulses: Normal pulses.      Heart sounds: Normal heart sounds.    Pulmonary:      Effort: Pulmonary effort is normal. No respiratory distress.      Breath sounds: Normal breath sounds.   Abdominal:      General: Bowel sounds are normal. There is no distension.      Palpations: Abdomen is soft.      Tenderness: There is abdominal tenderness in the right lower quadrant.   Musculoskeletal:         General: No swelling. Normal range of motion.      Cervical back: Normal range of motion and neck supple.   Skin:     General: Skin is warm and dry.      Capillary Refill: Capillary refill takes less than 2 seconds.   Neurological:      General: No focal deficit present.      Mental Status: She is alert and oriented to person, place, and time.      Cranial Nerves: No cranial nerve deficit.   Psychiatric:         Attention and Perception: Attention normal.         Mood and Affect: Mood and affect normal.              CRANIAL NERVES     CN III, IV, VI   Pupils are equal, round, and reactive to light.       Significant Labs: All pertinent labs within the past 24 hours have been reviewed.  CBC:   Recent Labs   Lab 03/16/24  0617   WBC 6.24   HGB 13.5   HCT 41.1        CMP:   Recent Labs   Lab 03/16/24  0617      K 4.2      CO2 21*      BUN 26*   CREATININE 0.8   CALCIUM 9.5   PROT 7.1   ALBUMIN 4.0   BILITOT 0.7   ALKPHOS 105   AST 13   ALT 16   ANIONGAP 12     Lactic Acid:   Recent Labs   Lab 03/16/24  0954 03/16/24  1439   LACTATE 3.2* 2.2     Troponin:   Recent Labs   Lab 03/16/24  0954   TROPONINI <0.006     Urine Studies:   Recent Labs   Lab 03/16/24  0622   COLORU Colorless*   APPEARANCEUA Clear   PHUR 6.0   SPECGRAV 1.010   PROTEINUA Negative   GLUCUA Negative   KETONESU Negative   BILIRUBINUA Negative   OCCULTUA Negative   NITRITE Negative   UROBILINOGEN Negative   LEUKOCYTESUR Trace*   RBCUA 2   WBCUA 10*   BACTERIA Few*   SQUAMEPITHEL 0     BNP  Recent Labs   Lab 03/16/24  0954   BNP 68     Lab Results   Component Value Date    CRP 11.0 (H) 03/16/2024      Microbiology Results (last 7 days)       Procedure Component Value Units Date/Time    Rapid Organism ID by PCR (from Blood culture) [[id number]] Collected: 03/16/24 0617    Order Status: No result Updated: 03/16/24 1515    Blood culture x two cultures. Draw prior to antibiotics. [[id number]] Collected: 03/16/24 0617    Order Status: Completed Specimen: Blood from Peripheral, Antecubital, Right Updated: 03/16/24 1514     Blood Culture, Routine Gram stain aer bottle: Gram negative rods      Gram stain alta bottle: Gram negative rods      Results called to and read back by:Faviola Handley RN  03/16/2024  15:13      JBM    Narrative:      Aerobic and anaerobic    Giardia Specific Antigen [[id number]]     Order Status: No result Specimen: Stool     Clostridium difficile EIA [[id number]]     Order Status: No result Specimen: Stool     Stool culture [[id number]]     Order Status: No result Specimen: Stool     Blood culture x two cultures. Draw prior to antibiotics. [[id number]] Collected: 03/16/24 0624    Order Status: Sent Specimen: Blood from Peripheral, Antecubital, Left Updated: 03/16/24 0919               X-Ray Chest AP Portable  Narrative: EXAMINATION:  XR CHEST AP PORTABLE    CLINICAL HISTORY:  Sepsis;    TECHNIQUE:  Single frontal view of the chest was performed.    COMPARISON:  09/10/2022    FINDINGS:  Left lower lung zone partially obscured.  Remaining lungs clear.  Unchanged heart size.  No pleural effusion or pneumothorax.  Impression: No airspace disease to suggest pneumonia.    Electronically signed by: Burak Rubio  Date:    03/16/2024  Time:    09:31  CT Abdomen Pelvis With IV Contrast NO Oral Contrast  Narrative: EXAMINATION:  CT ABDOMEN PELVIS WITH IV CONTRAST    CLINICAL HISTORY:  Nausea/vomiting;Sepsis;    TECHNIQUE:  Low dose axial images, sagittal and coronal reformations were obtained from the lung bases to the pubic symphysis following the IV administration of 100 mL of Omnipaque 350 .  Oral  contrast was not given.    COMPARISON:  09/11/2022    FINDINGS:  Lung bases are clear.  Normal sized heart.  Unremarkable gallbladder.    There are a few simple cysts along the left kidney cortex and pelvis.  Fatty atrophy along the proximal pancreas.  Remaining solid abdominal organs are unremarkable.    There is no enteric contrast which limits bowel assessment.  No dilated bowel loops.  There are a few scattered colonic diverticula.  Normal appendix.    Small fat containing umbilical defect.  Atrophied uterus and decompressed urinary bladder.    Mild multilevel spinal degenerative change.  Severe left hip degenerative change.  Impression: No acute findings in the abdomen.    Electronically signed by: Burak Rubio  Date:    03/16/2024  Time:    09:01     Significant Imaging: I have reviewed all pertinent imaging results/findings within the past 24 hours.  Assessment/Plan:     * Sepsis  This patient does have evidence of infective focus  My overall impression is sepsis.  Appears to be early sepsis.  Source: Abdominal  Antibiotics given-   Antibiotics (72h ago, onward)      Start     Stop Route Frequency Ordered    03/17/24 0630  cefTRIAXone (ROCEPHIN) 2 g in dextrose 5 % in water (D5W) 100 mL IVPB (MB+)  (Intra-abdominal Infection)         03/23/24 0629 IV Every 24 hours (non-standard times) 03/16/24 1244    03/16/24 1400  metronidazole IVPB 500 mg  (Intra-abdominal Infection)         03/22/24 1359 IV Every 8 hours 03/16/24 1244          Latest lactate reviewed-  Recent Labs   Lab 03/16/24  0617 03/16/24  0954 03/16/24  1439   LACTATE  --    < > 2.2   POCLAC 3.39*  --   --     < > = values in this interval not displayed.     Organ dysfunction:  None    Fluid challenge:  Total of 3L IV bolus fluid given an ED    Post- resuscitation assessment :  Hypotension improved      Will Not start Pressors- Levophed for MAP of 65  Source control achieved by:  Treating with antibiotic.    Acute gastroenteritis  IV fluid  hydration  IV Rocephin and Flagyl for possible bacterial etiology  Stool studies to evaluate for infectious process  IV antiemetics PRN  Advance diet as tolerated      Lactic acidosis  Due to sepsis/infection   IV fluid hydration  Continue to trend lactic acid    Positive blood culture  1 set of 2 positive GNR-- possible contamination  IV Rocephin  Await final culture      Dehydration  Due to nausea/vomiting and diarrhea  Evidenced by tachycardia and hypotension  IV fluid resuscitation  Advance diet when appropriate  IV antiemetics PRN      Hypotension  Likely due to infection and dehydration 2/2 vomiting and diarrhea  IV fluid hydration  Monitor on telemetry        VTE Risk Mitigation (From admission, onward)           Ordered     IP VTE LOW RISK PATIENT  Once         03/16/24 1244     Place sequential compression device  Until discontinued         03/16/24 1244     Place SALEEM hose  Until discontinued         03/16/24 1244                                    Belen Donald NP  Department of Hospital Medicine  [facility]

## 2024-03-16 NOTE — HPI
Ms. Chatterjee is a 58-year-old female with a past medical history of nephrolithiasis.  She was in her usual state of health when she started having abdominal pain, nausea/vomiting, and diarrhea.  The pain got progressively worse and diffuse to her back/trunk in uterus area.  She was concerned about having recurrent infection and kidney stone so she came to the hospital.  While in the ED, she had a CT of the abdomen/pelvis with IV contrast and it was negative.  She met sepsis criteria and was given IV Rocephin for possible UTI.  Her lactic acid came back elevated and she also was mildly hypotensive and bolused with IV NS.  She is being admitted to the hospital for further treatment and management.  She currently reports her nausea and abdominal pain are much better.  Still having right lower quadrant tenderness.  She report it is uterus pain. No diarrhea since being in the hospital.  Complains of having headache currently.

## 2024-03-16 NOTE — ASSESSMENT & PLAN NOTE
Due to nausea/vomiting and diarrhea  Evidenced by tachycardia and hypotension  IV fluid resuscitation  Advance diet when appropriate  IV antiemetics PRN

## 2024-03-16 NOTE — ASSESSMENT & PLAN NOTE
Likely due to infection and dehydration 2/2 vomiting and diarrhea  IV fluid hydration  Monitor on telemetry

## 2024-03-16 NOTE — ASSESSMENT & PLAN NOTE
IV fluid hydration  IV Rocephin and Flagyl for possible bacterial etiology  Stool studies to evaluate for infectious process  IV antiemetics PRN  Advance diet as tolerated

## 2024-03-16 NOTE — SUBJECTIVE & OBJECTIVE
No past medical history on file.    Past Surgical History:   Procedure Laterality Date    CYSTOSCOPY N/A 9/23/2022    Procedure: CYSTOSCOPY;  Surgeon: Woo Ramirez MD;  Location: [address];  Service: Urology;  Laterality: N/A;    CYSTOSCOPY W/ URETERAL STENT PLACEMENT Right 9/4/2022    Procedure: CYSTOSCOPY, WITH URETERAL STENT INSERTION;  Surgeon: Ree Pak MD;  Location: [address];  Service: Urology;  Laterality: Right;    LASER LITHOTRIPSY Right 9/23/2022    Procedure: LITHOTRIPSY, USING LASER;  Surgeon: Woo Ramirez MD;  Location: [facility] OR [address] [place] [address];  Service: Urology;  Laterality: Right;    PYELOSCOPY Right 9/23/2022    Procedure: PYELOSCOPY;  Surgeon: Woo Ramirez MD;  Location: [address];  Service: Urology;  Laterality: Right;    RETROGRADE PYELOGRAPHY Right 9/4/2022    Procedure: PYELOGRAM, RETROGRADE;  Surgeon: Ree Pak MD;  Location: [address];  Service: Urology;  Laterality: Right;    RETROGRADE PYELOGRAPHY Right 9/23/2022    Procedure: PYELOGRAM, RETROGRADE;  Surgeon: Woo Ramirez MD;  Location: [address];  Service: Urology;  Laterality: Right;    URETEROSCOPIC REMOVAL OF URETERIC CALCULUS Right 9/23/2022    Procedure: REMOVAL, CALCULUS, URETER, URETEROSCOPIC;  Surgeon: Woo Ramirez MD;  Location: [address];  Service: Urology;  Laterality: Right;    URETEROSCOPY Right 9/23/2022    Procedure: URETEROSCOPY;  Surgeon: Woo Ramirez MD;  Location: [address];  Service: Urology;  Laterality: Right;       Review of patient's allergies indicates:  No Known Allergies    No current facility-administered medications on file prior to encounter.     Current Outpatient Medications on File Prior to Encounter   Medication Sig    ondansetron (ZOFRAN-ODT) 4 MG TbDL Take 1 tablet (4 mg total) by mouth every 6 (six) hours as needed (nausea vomiting). (Patient not taking: No sig reported)    oxybutynin (DITROPAN) 5 MG Tab Take 1 tablet (5 mg  total) by mouth 3 (three) times daily. (Patient not taking: No sig reported)    tamsulosin (FLOMAX) 0.4 mg Cap Take 1 capsule (0.4 mg total) by mouth once daily.     Family History       Problem Relation (Age of Onset)    Diabetes Mother, Father    Hypertension Mother, Father          Tobacco Use    Smoking status: Never    Smokeless tobacco: Never   Substance and Sexual Activity    Alcohol use: No    Drug use: No    Sexual activity: Yes     Partners: Male     Review of Systems   Constitutional:  Negative for appetite change, chills, fatigue and fever.   HENT:  Negative for congestion, hearing loss, sore throat and trouble swallowing.    Respiratory:  Negative for chest tightness and shortness of breath.    Cardiovascular:  Negative for chest pain, palpitations and leg swelling.   Gastrointestinal:  Positive for abdominal pain, diarrhea, nausea and vomiting. Negative for abdominal distention, blood in stool and constipation.   Genitourinary:  Negative for decreased urine volume, dysuria and hematuria.   Musculoskeletal:  Negative for arthralgias, back pain and gait problem.   Skin:  Negative for color change and wound.   Neurological:  Positive for headaches. Negative for dizziness, tremors, speech difficulty, weakness and numbness.   Psychiatric/Behavioral:  Negative for agitation, confusion, hallucinations and sleep disturbance.      Objective:     Vital Signs (Most Recent):  Temp: 99 °F (37.2 °C) (03/16/24 0703)  Pulse: (!) 114 (03/16/24 1332)  Resp: 18 (03/16/24 0703)  BP: (!) 96/54 (03/16/24 1332)  SpO2: 99 % (03/16/24 1332) Vital Signs (24h Range):  Temp:  [98.2 °F (36.8 °C)-99 °F (37.2 °C)] 99 °F (37.2 °C)  Pulse:  [105-116] 114  Resp:  [18-20] 18  SpO2:  [97 %-100 %] 99 %  BP: ()/(41-63) 96/54     Weight: 106.6 kg (235 lb)  Body mass index is 40.34 kg/m².     Physical Exam  Constitutional:       General: She is not in acute distress.     Appearance: Normal appearance.   HENT:      Head: Normocephalic  and atraumatic.      Nose: Nose normal. No congestion.      Mouth/Throat:      Mouth: Mucous membranes are moist.      Pharynx: Oropharynx is clear.   Eyes:      Extraocular Movements: Extraocular movements intact.      Conjunctiva/sclera: Conjunctivae normal.      Pupils: Pupils are equal, round, and reactive to light.   Cardiovascular:      Rate and Rhythm: Normal rate and regular rhythm.      Pulses: Normal pulses.      Heart sounds: Normal heart sounds.   Pulmonary:      Effort: Pulmonary effort is normal. No respiratory distress.      Breath sounds: Normal breath sounds.   Abdominal:      General: Bowel sounds are normal. There is no distension.      Palpations: Abdomen is soft.      Tenderness: There is abdominal tenderness in the right lower quadrant.   Musculoskeletal:         General: No swelling. Normal range of motion.      Cervical back: Normal range of motion and neck supple.   Skin:     General: Skin is warm and dry.      Capillary Refill: Capillary refill takes less than 2 seconds.   Neurological:      General: No focal deficit present.      Mental Status: She is alert and oriented to person, place, and time.      Cranial Nerves: No cranial nerve deficit.   Psychiatric:         Attention and Perception: Attention normal.         Mood and Affect: Mood and affect normal.              CRANIAL NERVES     CN III, IV, VI   Pupils are equal, round, and reactive to light.       Significant Labs: All pertinent labs within the past 24 hours have been reviewed.  CBC:   Recent Labs   Lab 03/16/24  0617   WBC 6.24   HGB 13.5   HCT 41.1        CMP:   Recent Labs   Lab 03/16/24  0617      K 4.2      CO2 21*      BUN 26*   CREATININE 0.8   CALCIUM 9.5   PROT 7.1   ALBUMIN 4.0   BILITOT 0.7   ALKPHOS 105   AST 13   ALT 16   ANIONGAP 12     Lactic Acid:   Recent Labs   Lab 03/16/24  0954 03/16/24  1439   LACTATE 3.2* 2.2     Troponin:   Recent Labs   Lab 03/16/24  0954   TROPONINI <0.006      Urine Studies:   Recent Labs   Lab 03/16/24  0622   COLORU Colorless*   APPEARANCEUA Clear   PHUR 6.0   SPECGRAV 1.010   PROTEINUA Negative   GLUCUA Negative   KETONESU Negative   BILIRUBINUA Negative   OCCULTUA Negative   NITRITE Negative   UROBILINOGEN Negative   LEUKOCYTESUR Trace*   RBCUA 2   WBCUA 10*   BACTERIA Few*   SQUAMEPITHEL 0     BNP  Recent Labs   Lab 03/16/24  0954   BNP 68     Lab Results   Component Value Date    CRP 11.0 (H) 03/16/2024     Microbiology Results (last 7 days)       Procedure Component Value Units Date/Time    Rapid Organism ID by PCR (from Blood culture) [[id number]] Collected: 03/16/24 0617    Order Status: No result Updated: 03/16/24 1515    Blood culture x two cultures. Draw prior to antibiotics. [[id number]] Collected: 03/16/24 0617    Order Status: Completed Specimen: Blood from Peripheral, Antecubital, Right Updated: 03/16/24 1514     Blood Culture, Routine Gram stain aer bottle: Gram negative rods      Gram stain alta bottle: Gram negative rods      Results called to and read back by:Faviola Handley RN  03/16/2024  15:13      JBM    Narrative:      Aerobic and anaerobic    Giardia Specific Antigen [[id number]]     Order Status: No result Specimen: Stool     Clostridium difficile EIA [[id number]]     Order Status: No result Specimen: Stool     Stool culture [[id number]]     Order Status: No result Specimen: Stool     Blood culture x two cultures. Draw prior to antibiotics. [[id number]] Collected: 03/16/24 0624    Order Status: Sent Specimen: Blood from Peripheral, Antecubital, Left Updated: 03/16/24 0919               X-Ray Chest AP Portable  Narrative: EXAMINATION:  XR CHEST AP PORTABLE    CLINICAL HISTORY:  Sepsis;    TECHNIQUE:  Single frontal view of the chest was performed.    COMPARISON:  09/10/2022    FINDINGS:  Left lower lung zone partially obscured.  Remaining lungs clear.  Unchanged heart size.  No pleural effusion or pneumothorax.  Impression: No airspace disease  to suggest pneumonia.    Electronically signed by: uBrak Rubio  Date:    03/16/2024  Time:    09:31  CT Abdomen Pelvis With IV Contrast NO Oral Contrast  Narrative: EXAMINATION:  CT ABDOMEN PELVIS WITH IV CONTRAST    CLINICAL HISTORY:  Nausea/vomiting;Sepsis;    TECHNIQUE:  Low dose axial images, sagittal and coronal reformations were obtained from the lung bases to the pubic symphysis following the IV administration of 100 mL of Omnipaque 350 .  Oral contrast was not given.    COMPARISON:  09/11/2022    FINDINGS:  Lung bases are clear.  Normal sized heart.  Unremarkable gallbladder.    There are a few simple cysts along the left kidney cortex and pelvis.  Fatty atrophy along the proximal pancreas.  Remaining solid abdominal organs are unremarkable.    There is no enteric contrast which limits bowel assessment.  No dilated bowel loops.  There are a few scattered colonic diverticula.  Normal appendix.    Small fat containing umbilical defect.  Atrophied uterus and decompressed urinary bladder.    Mild multilevel spinal degenerative change.  Severe left hip degenerative change.  Impression: No acute findings in the abdomen.    Electronically signed by: Burak Rubio  Date:    03/16/2024  Time:    09:01     Significant Imaging: I have reviewed all pertinent imaging results/findings within the past 24 hours.

## 2024-03-16 NOTE — ASSESSMENT & PLAN NOTE
This patient does have evidence of infective focus  My overall impression is sepsis.  Appears to be early sepsis.  Source: Abdominal  Antibiotics given-   Antibiotics (72h ago, onward)      Start     Stop Route Frequency Ordered    03/17/24 0630  cefTRIAXone (ROCEPHIN) 2 g in dextrose 5 % in water (D5W) 100 mL IVPB (MB+)  (Intra-abdominal Infection)         03/23/24 0629 IV Every 24 hours (non-standard times) 03/16/24 1244    03/16/24 1400  metronidazole IVPB 500 mg  (Intra-abdominal Infection)         03/22/24 1359 IV Every 8 hours 03/16/24 1244          Latest lactate reviewed-  Recent Labs   Lab 03/16/24  0617 03/16/24  0954 03/16/24  1439   LACTATE  --    < > 2.2   POCLAC 3.39*  --   --     < > = values in this interval not displayed.     Organ dysfunction:  None    Fluid challenge:  Total of 3L IV bolus fluid given an ED    Post- resuscitation assessment :  Hypotension improved      Will Not start Pressors- Levophed for MAP of 65  Source control achieved by:  Treating with antibiotic.

## 2024-03-16 NOTE — ED PROVIDER NOTES
Encounter Date: 3/16/2024       History     Chief Complaint   Patient presents with    General Illness     Woke up with nausea, vomiting and diarrhea. Also back pain and pain in uterus area.      58 y.o. female with history of kidney stones presents for abdominal pain and general malaise.  Patient reports she woke up feeling ill and proceeded to have nausea, vomiting.  She also reports some diarrhea.  She reports diffuse back/trunk pain with severe pain in the uterus area.  She reports history of similar in the past where she had severe infection and required emergent urologic procedure for kidney stone.  She denies any shortness of breath, cough, chest pain, vaginal bleeding, vaginal discharge    The history is provided by the patient and medical records.     Review of patient's allergies indicates:  No Known Allergies  No past medical history on file.  Past Surgical History:   Procedure Laterality Date    CYSTOSCOPY N/A 9/23/2022    Procedure: CYSTOSCOPY;  Surgeon: Woo Ramirez MD;  Location: [address];  Service: Urology;  Laterality: N/A;    CYSTOSCOPY W/ URETERAL STENT PLACEMENT Right 9/4/2022    Procedure: CYSTOSCOPY, WITH URETERAL STENT INSERTION;  Surgeon: Ree Pak MD;  Location: [address];  Service: Urology;  Laterality: Right;    LASER LITHOTRIPSY Right 9/23/2022    Procedure: LITHOTRIPSY, USING LASER;  Surgeon: Woo Ramirez MD;  Location: [address];  Service: Urology;  Laterality: Right;    PYELOSCOPY Right 9/23/2022    Procedure: PYELOSCOPY;  Surgeon: Woo Ramirez MD;  Location: [address];  Service: Urology;  Laterality: Right;    RETROGRADE PYELOGRAPHY Right 9/4/2022    Procedure: PYELOGRAM, RETROGRADE;  Surgeon: Ree Pak MD;  Location: [address];  Service: Urology;  Laterality: Right;    RETROGRADE PYELOGRAPHY Right 9/23/2022    Procedure: PYELOGRAM, RETROGRADE;  Surgeon: Woo Ramirez MD;  Location: [address];  Service: Urology;   Laterality: Right;    URETEROSCOPIC REMOVAL OF URETERIC CALCULUS Right 9/23/2022    Procedure: REMOVAL, CALCULUS, URETER, URETEROSCOPIC;  Surgeon: Woo Ramirez MD;  Location: [address];  Service: Urology;  Laterality: Right;    URETEROSCOPY Right 9/23/2022    Procedure: URETEROSCOPY;  Surgeon: Woo Ramirez MD;  Location: [address];  Service: Urology;  Laterality: Right;     Family History   Problem Relation Age of Onset    Diabetes Mother     Hypertension Mother     Diabetes Father     Hypertension Father      Social History     Tobacco Use    Smoking status: Never    Smokeless tobacco: Never   Substance Use Topics    Alcohol use: No    Drug use: No     Review of Systems   Reason unable to perform ROS: See HPI for relevant ROS.       Physical Exam     Initial Vitals [03/16/24 0537]   BP Pulse Resp Temp SpO2   (!) 141/63 (!) 116 20 98.2 °F (36.8 °C) 99 %      MAP       --         Physical Exam    ED Course   Critical Care    Date/Time: 3/16/2024 5:25 AM    Performed by: Dwight Robles MD  Authorized by: Dwight Robles MD  Direct patient critical care time: 10 minutes  Additional history critical care time: 5 minutes  Ordering / reviewing critical care time: 5 minutes  Documentation critical care time: 5 minutes  Consult with family critical care time: 5 minutes  Other critical care time: 5 minutes  Total critical care time (exclusive of procedural time) : 35 minutes  Critical care time was exclusive of separately billable procedures and treating other patients.  Critical care was necessary to treat or prevent imminent or life-threatening deterioration of the following conditions: sepsis.  Critical care was time spent personally by me on the following activities: development of treatment plan with patient or surrogate, obtaining history from patient or surrogate, ordering and performing treatments and interventions, ordering and review of laboratory studies, evaluation of patient's response to  treatment, examination of patient, re-evaluation of patient's condition, review of old charts and pulse oximetry.        Labs Reviewed   CBC W/ AUTO DIFFERENTIAL - Abnormal; Notable for the following components:       Result Value    MCH 31.4 (*)     Lymph # 0.5 (*)     Mono # 0.0 (*)     Gran % 91.3 (*)     Lymph % 7.2 (*)     Mono % 0.5 (*)     All other components within normal limits   COMPREHENSIVE METABOLIC PANEL - Abnormal; Notable for the following components:    CO2 21 (*)     BUN 26 (*)     All other components within normal limits   URINALYSIS, REFLEX TO URINE CULTURE - Abnormal; Notable for the following components:    Color, UA Colorless (*)     Leukocytes, UA Trace (*)     All other components within normal limits    Narrative:     Specimen Source->Urine   URINALYSIS MICROSCOPIC - Abnormal; Notable for the following components:    WBC, UA 10 (*)     Bacteria Few (*)     All other components within normal limits    Narrative:     Specimen Source->Urine   LACTIC ACID, PLASMA - Abnormal; Notable for the following components:    Lactate (Lactic Acid) 3.2 (*)     All other components within normal limits    Narrative:     Collection has been rescheduled by DTT at 03/16/2024 09:34 Reason:   Doc SaidCome back at 10am    C-REACTIVE PROTEIN - Abnormal; Notable for the following components:    CRP 11.0 (*)     All other components within normal limits   ISTAT LACTATE - Abnormal; Notable for the following components:    POC Lactate 3.39 (*)     All other components within normal limits   CULTURE, BLOOD    Narrative:     Aerobic and anaerobic   CULTURE, BLOOD    Narrative:     Aerobic and anaerobic   GIARDIA SPECIFIC ANTIGEN   CLOSTRIDIUM DIFFICILE   CULTURE, STOOL   SARS-COV-2 RNA AMPLIFICATION, QUAL   B-TYPE NATRIURETIC PEPTIDE   TROPONIN I   LACTIC ACID, PLASMA   H. PYLORI ANTIGEN, STOOL   PANCREATIC ELASTASE, FECAL   FECAL FAT, QUALITATIVE   OCCULT BLOOD X 1, STOOL   WBC, STOOL   ROTAVIRUS ANTIGEN, STOOL    CALPROTECTIN, STOOL   CRYPTOSPORIDIUM ANTIGEN, STOOL   STOOL EXAM-OVA,CYSTS,PARASITES          Imaging Results              CT Abdomen Pelvis With IV Contrast NO Oral Contrast (Final result)  Result time 03/16/24 09:01:15      Final result by Burak Rubio MD (03/16/24 09:01:15)                   Impression:      No acute findings in the abdomen.      Electronically signed by: Burak Rubio  Date:    03/16/2024  Time:    09:01               Narrative:    EXAMINATION:  CT ABDOMEN PELVIS WITH IV CONTRAST    CLINICAL HISTORY:  Nausea/vomiting;Sepsis;    TECHNIQUE:  Low dose axial images, sagittal and coronal reformations were obtained from the lung bases to the pubic symphysis following the IV administration of 100 mL of Omnipaque 350 .  Oral contrast was not given.    COMPARISON:  09/11/2022    FINDINGS:  Lung bases are clear.  Normal sized heart.  Unremarkable gallbladder.    There are a few simple cysts along the left kidney cortex and pelvis.  Fatty atrophy along the proximal pancreas.  Remaining solid abdominal organs are unremarkable.    There is no enteric contrast which limits bowel assessment.  No dilated bowel loops.  There are a few scattered colonic diverticula.  Normal appendix.    Small fat containing umbilical defect.  Atrophied uterus and decompressed urinary bladder.    Mild multilevel spinal degenerative change.  Severe left hip degenerative change.                                       X-Ray Chest AP Portable (Final result)  Result time 03/16/24 09:31:22      Final result by Burak Rubio MD (03/16/24 09:31:22)                   Impression:      No airspace disease to suggest pneumonia.      Electronically signed by: Burak Rubio  Date:    03/16/2024  Time:    09:31               Narrative:    EXAMINATION:  XR CHEST AP PORTABLE    CLINICAL HISTORY:  Sepsis;    TECHNIQUE:  Single frontal view of the chest was performed.    COMPARISON:  09/10/2022    FINDINGS:  Left lower lung zone  partially obscured.  Remaining lungs clear.  Unchanged heart size.  No pleural effusion or pneumothorax.                                       Medications   cefTRIAXone (ROCEPHIN) 2 g in dextrose 5 % in water (D5W) 100 mL IVPB (MB+) (has no administration in time range)   metronidazole IVPB 500 mg (0 mg Intravenous Stopped 3/16/24 1524)   acetaminophen tablet 650 mg (650 mg Oral Given 3/16/24 1422)   ondansetron disintegrating tablet 8 mg (8 mg Oral Given 3/16/24 1934)   prochlorperazine injection Soln 5 mg (has no administration in time range)   melatonin tablet 6 mg (has no administration in time range)   cefTRIAXone (Rocephin) 1 g in dextrose 5 % in water (D5W) 100 mL IVPB (MB+) (0 g Intravenous Stopped 3/16/24 0655)   lactated ringers bolus 2,000 mL (0 mLs Intravenous Stopped 3/16/24 0900)   ondansetron injection 4 mg (4 mg Intravenous Given 3/16/24 0621)   ketorolac injection 9.999 mg (9.999 mg Intravenous Given 3/16/24 0621)   iohexoL (OMNIPAQUE 350) 350 mg iodine/mL injection (100 mLs Intravenous Given 3/16/24 0733)   acetaminophen tablet 1,000 mg (1,000 mg Oral Given 3/16/24 0820)   ondansetron injection 4 mg (4 mg Intravenous Given 3/16/24 0822)   hyoscyamine injection 0.5 mg (0.5 mg Intravenous Given 3/16/24 1320)   lactated ringers bolus 1,000 mL (0 mLs Intravenous Stopped 3/16/24 1355)     Medical Decision Making  58 y.o. female with history of kidney stones presents for abdominal pain and general malaise  Patient with symptoms concerning for sepsis  Sepsis protocols initiated, patient started on IV fluids, IV antibiotics, broad workup including blood cultures ordered, will evaluate for possible source.    Differentials include UTI, pyelonephritis, infected kidney stone, viral illness, gastroenteritis, diverticulitis, metabolic derangement  IV antibiotics were given empirically  Full 30 cc/kg bolus was not ordered as patient does have contraindications to aggressive IV fluid resuscitation (high BMI which  would warrant excessive IV fluids)  Abdominal exam overall benign, no peritonitic signs.  Patient is hemodynamically stable but overall ill-appearing.  Lungs clear, no hypoxia, no evidence of pneumonia  CT abdomen pelvis ordered with no evidence of infected kidney stone  Patient care handed off to oncoming team pending re-evaluation, CT resolved, and likely admission      Amount and/or Complexity of Data Reviewed  Labs: ordered. Decision-making details documented in ED Course.  Radiology: ordered.    Risk  OTC drugs.  Prescription drug management.  Decision regarding hospitalization.               ED Course as of 03/16/24 2029   Sat Mar 16, 2024   0627 POC Lactate(!): 3.39 [OK]   0627 Pulse(!): 116 [OK]   0651 CBC auto differential(!)  No leukocytosis or anemia [OK]   0658 Urinalysis Microscopic(!)  Concerning for UTI [OK]   0816 Patient is just starting her 1st 2 L bolus.  Will await to check vital signs after those 2 L to see if she is improving prior to giving 1/3 L.  Canceled 3 L for now. [JS]   0906 CT abdomen pelvis read by the radiologist shows no signs of acute intra-abdominal process.  There does not appear to be in any signs of kidney stones or pyelonephritis. [JS]   0957 Patient appears to have Q-waves in anterior leads with no significant ST depression.  This might be an early left bundle-branch block.  This is new.  She has no chest pain.  Will add on troponin. [JS]   1006 Patient has no chest pain and does not feel short of breath at this time.  She still is tachycardic and slightly hypotensive however.  Given the findings of the what appears to be left bundle-branch block on EKG this could be rate related however I feel it would be best to observe her given her persistent hypotension tachycardia.  No signs of heart failure on exam.  She has no chest pain.  I added on a troponin and BNP as well as a 2nd lactic acid. [JS]   1020 EKG to me appears to be a left bundle.  This could be rate related.  She is  still slightly tachycardic and slightly hypotensive.  I feel that she overall has gastroenteritis.  Given the left bundle I examined her again and she denies any chest pain or shortness of breath.  However I have added on a BNP and troponin.  On repeat examination she has no rales.  She is just finished 2 L of fluids and given her persistent vital signs I will admit her for IV fluid hydration monitoring.  I spoke to Hospital Medicine who agrees with the plan.  I am not soles that her urinalysis truly is indicative of a urinary tract infection. [JS]   1053 Troponin negative BNP within normal limits.  Lactic acid 3.2.  Patient getting IV fluids and can possibly tolerate more given persistent lactic acidosis.  Will continue with hydration. [JS]      ED Course User Index  [JS] Alverto Nicholson MD  [OK] Dwight Robles MD                           Clinical Impression:  Final diagnoses:  [A41.9] Sepsis (Primary)  [I95.9] Hypotension  [E86.0] Dehydration  [R11.2, R19.7] Nausea vomiting and diarrhea  [I44.7] Incomplete left bundle branch block          ED Disposition Condition    Admit                 Dwight Robles MD  03/16/24 2029       Dwight Robles MD  03/23/24 1000     Azithromycin Counseling:  I discussed with the patient the risks of azithromycin including but not limited to GI upset, allergic reaction, drug rash, diarrhea, and yeast infections.

## 2024-03-17 PROBLEM — E87.20 LACTIC ACIDOSIS: Status: RESOLVED | Noted: 2024-03-16 | Resolved: 2024-03-17

## 2024-03-17 PROBLEM — R51.9 ACUTE HEADACHE: Status: ACTIVE | Noted: 2024-03-17

## 2024-03-17 PROBLEM — R65.20 SEVERE SEPSIS: Status: ACTIVE | Noted: 2024-03-16

## 2024-03-17 LAB
ALBUMIN SERPL BCP-MCNC: 2.8 G/DL (ref 3.5–5.2)
ALBUMIN SERPL BCP-MCNC: 3 G/DL (ref 3.5–5.2)
ALP SERPL-CCNC: 69 U/L (ref 55–135)
ALP SERPL-CCNC: 89 U/L (ref 55–135)
ALT SERPL W/O P-5'-P-CCNC: 15 U/L (ref 10–44)
ALT SERPL W/O P-5'-P-CCNC: 18 U/L (ref 10–44)
ANION GAP SERPL CALC-SCNC: 8 MMOL/L (ref 8–16)
ANION GAP SERPL CALC-SCNC: 9 MMOL/L (ref 8–16)
AST SERPL-CCNC: 15 U/L (ref 10–40)
AST SERPL-CCNC: 20 U/L (ref 10–40)
BASOPHILS # BLD AUTO: 0.02 K/UL (ref 0–0.2)
BASOPHILS # BLD AUTO: 0.04 K/UL (ref 0–0.2)
BASOPHILS NFR BLD: 0.2 % (ref 0–1.9)
BASOPHILS NFR BLD: 0.3 % (ref 0–1.9)
BILIRUB SERPL-MCNC: 0.7 MG/DL (ref 0.1–1)
BILIRUB SERPL-MCNC: 0.7 MG/DL (ref 0.1–1)
BUN SERPL-MCNC: 13 MG/DL (ref 6–20)
BUN SERPL-MCNC: 17 MG/DL (ref 6–20)
CALCIUM SERPL-MCNC: 7.8 MG/DL (ref 8.7–10.5)
CALCIUM SERPL-MCNC: 8.7 MG/DL (ref 8.7–10.5)
CHLORIDE SERPL-SCNC: 110 MMOL/L (ref 95–110)
CHLORIDE SERPL-SCNC: 110 MMOL/L (ref 95–110)
CO2 SERPL-SCNC: 20 MMOL/L (ref 23–29)
CO2 SERPL-SCNC: 22 MMOL/L (ref 23–29)
CREAT SERPL-MCNC: 0.8 MG/DL (ref 0.5–1.4)
CREAT SERPL-MCNC: 0.8 MG/DL (ref 0.5–1.4)
CRP SERPL-MCNC: 243.4 MG/L (ref 0–8.2)
DIFFERENTIAL METHOD BLD: ABNORMAL
DIFFERENTIAL METHOD BLD: ABNORMAL
EOSINOPHIL # BLD AUTO: 0.1 K/UL (ref 0–0.5)
EOSINOPHIL # BLD AUTO: 0.2 K/UL (ref 0–0.5)
EOSINOPHIL NFR BLD: 0.5 % (ref 0–8)
EOSINOPHIL NFR BLD: 1.7 % (ref 0–8)
ERYTHROCYTE [DISTWIDTH] IN BLOOD BY AUTOMATED COUNT: 13 % (ref 11.5–14.5)
ERYTHROCYTE [DISTWIDTH] IN BLOOD BY AUTOMATED COUNT: 13.2 % (ref 11.5–14.5)
EST. GFR  (NO RACE VARIABLE): >60 ML/MIN/1.73 M^2
EST. GFR  (NO RACE VARIABLE): >60 ML/MIN/1.73 M^2
GLUCOSE SERPL-MCNC: 102 MG/DL (ref 70–110)
GLUCOSE SERPL-MCNC: 103 MG/DL (ref 70–110)
HCT VFR BLD AUTO: 33.2 % (ref 37–48.5)
HCT VFR BLD AUTO: 35.7 % (ref 37–48.5)
HGB BLD-MCNC: 10.8 G/DL (ref 12–16)
HGB BLD-MCNC: 11.7 G/DL (ref 12–16)
IMM GRANULOCYTES # BLD AUTO: 0.05 K/UL (ref 0–0.04)
IMM GRANULOCYTES # BLD AUTO: 0.08 K/UL (ref 0–0.04)
IMM GRANULOCYTES NFR BLD AUTO: 0.3 % (ref 0–0.5)
IMM GRANULOCYTES NFR BLD AUTO: 0.6 % (ref 0–0.5)
LACTATE SERPL-SCNC: 1.6 MMOL/L (ref 0.5–2.2)
LYMPHOCYTES # BLD AUTO: 0.6 K/UL (ref 1–4.8)
LYMPHOCYTES # BLD AUTO: 0.9 K/UL (ref 1–4.8)
LYMPHOCYTES NFR BLD: 4 % (ref 18–48)
LYMPHOCYTES NFR BLD: 6.4 % (ref 18–48)
MAGNESIUM SERPL-MCNC: 1.7 MG/DL (ref 1.6–2.6)
MCH RBC QN AUTO: 31.6 PG (ref 27–31)
MCH RBC QN AUTO: 31.7 PG (ref 27–31)
MCHC RBC AUTO-ENTMCNC: 32.5 G/DL (ref 32–36)
MCHC RBC AUTO-ENTMCNC: 32.8 G/DL (ref 32–36)
MCV RBC AUTO: 97 FL (ref 82–98)
MCV RBC AUTO: 97 FL (ref 82–98)
MONOCYTES # BLD AUTO: 0.7 K/UL (ref 0.3–1)
MONOCYTES # BLD AUTO: 0.7 K/UL (ref 0.3–1)
MONOCYTES NFR BLD: 4.7 % (ref 4–15)
MONOCYTES NFR BLD: 5 % (ref 4–15)
NEUTROPHILS # BLD AUTO: 11.5 K/UL (ref 1.8–7.7)
NEUTROPHILS # BLD AUTO: 13.1 K/UL (ref 1.8–7.7)
NEUTROPHILS NFR BLD: 86.1 % (ref 38–73)
NEUTROPHILS NFR BLD: 90.2 % (ref 38–73)
NRBC BLD-RTO: 0 /100 WBC
NRBC BLD-RTO: 0 /100 WBC
PHOSPHATE SERPL-MCNC: 3.1 MG/DL (ref 2.7–4.5)
PLATELET # BLD AUTO: 105 K/UL (ref 150–450)
PLATELET # BLD AUTO: 94 K/UL (ref 150–450)
PLATELET BLD QL SMEAR: ABNORMAL
PMV BLD AUTO: 11.1 FL (ref 9.2–12.9)
PMV BLD AUTO: 11.3 FL (ref 9.2–12.9)
POTASSIUM SERPL-SCNC: 4.3 MMOL/L (ref 3.5–5.1)
POTASSIUM SERPL-SCNC: 4.3 MMOL/L (ref 3.5–5.1)
PROCALCITONIN SERPL IA-MCNC: 16.78 NG/ML (ref 0–0.5)
PROT SERPL-MCNC: 5.4 G/DL (ref 6–8.4)
PROT SERPL-MCNC: 6 G/DL (ref 6–8.4)
RBC # BLD AUTO: 3.41 M/UL (ref 4–5.4)
RBC # BLD AUTO: 3.7 M/UL (ref 4–5.4)
SODIUM SERPL-SCNC: 139 MMOL/L (ref 136–145)
SODIUM SERPL-SCNC: 140 MMOL/L (ref 136–145)
WBC # BLD AUTO: 13.32 K/UL (ref 3.9–12.7)
WBC # BLD AUTO: 14.56 K/UL (ref 3.9–12.7)

## 2024-03-17 PROCEDURE — 86140 C-REACTIVE PROTEIN: CPT | Performed by: NURSE PRACTITIONER

## 2024-03-17 PROCEDURE — 85025 COMPLETE CBC W/AUTO DIFF WBC: CPT | Mod: 91 | Performed by: INTERNAL MEDICINE

## 2024-03-17 PROCEDURE — 85025 COMPLETE CBC W/AUTO DIFF WBC: CPT | Performed by: NURSE PRACTITIONER

## 2024-03-17 PROCEDURE — 25000003 PHARM REV CODE 250

## 2024-03-17 PROCEDURE — 80053 COMPREHEN METABOLIC PANEL: CPT | Performed by: NURSE PRACTITIONER

## 2024-03-17 PROCEDURE — 80053 COMPREHEN METABOLIC PANEL: CPT | Mod: 91 | Performed by: INTERNAL MEDICINE

## 2024-03-17 PROCEDURE — 25000003 PHARM REV CODE 250: Performed by: NURSE PRACTITIONER

## 2024-03-17 PROCEDURE — 94761 N-INVAS EAR/PLS OXIMETRY MLT: CPT

## 2024-03-17 PROCEDURE — 25000003 PHARM REV CODE 250: Performed by: INTERNAL MEDICINE

## 2024-03-17 PROCEDURE — 36415 COLL VENOUS BLD VENIPUNCTURE: CPT | Performed by: NURSE PRACTITIONER

## 2024-03-17 PROCEDURE — 63600175 PHARM REV CODE 636 W HCPCS: Performed by: NURSE PRACTITIONER

## 2024-03-17 PROCEDURE — 63600175 PHARM REV CODE 636 W HCPCS: Performed by: INTERNAL MEDICINE

## 2024-03-17 PROCEDURE — 36415 COLL VENOUS BLD VENIPUNCTURE: CPT | Performed by: INTERNAL MEDICINE

## 2024-03-17 PROCEDURE — 83605 ASSAY OF LACTIC ACID: CPT | Performed by: NURSE PRACTITIONER

## 2024-03-17 PROCEDURE — 99223 1ST HOSP IP/OBS HIGH 75: CPT | Mod: ,,, | Performed by: INTERNAL MEDICINE

## 2024-03-17 PROCEDURE — 11000001 HC ACUTE MED/SURG PRIVATE ROOM

## 2024-03-17 PROCEDURE — 83735 ASSAY OF MAGNESIUM: CPT | Performed by: NURSE PRACTITIONER

## 2024-03-17 PROCEDURE — C9113 INJ PANTOPRAZOLE SODIUM, VIA: HCPCS | Performed by: NURSE PRACTITIONER

## 2024-03-17 PROCEDURE — 84100 ASSAY OF PHOSPHORUS: CPT | Performed by: NURSE PRACTITIONER

## 2024-03-17 PROCEDURE — 84145 PROCALCITONIN (PCT): CPT | Performed by: NURSE PRACTITIONER

## 2024-03-17 RX ORDER — METHOCARBAMOL 750 MG/1
750 TABLET, FILM COATED ORAL 3 TIMES DAILY PRN
Status: DISCONTINUED | OUTPATIENT
Start: 2024-03-17 | End: 2024-03-19 | Stop reason: HOSPADM

## 2024-03-17 RX ORDER — ENOXAPARIN SODIUM 100 MG/ML
40 INJECTION SUBCUTANEOUS EVERY 12 HOURS
Status: DISCONTINUED | OUTPATIENT
Start: 2024-03-17 | End: 2024-03-19 | Stop reason: HOSPADM

## 2024-03-17 RX ORDER — PANTOPRAZOLE SODIUM 40 MG/10ML
40 INJECTION, POWDER, LYOPHILIZED, FOR SOLUTION INTRAVENOUS DAILY
Status: DISCONTINUED | OUTPATIENT
Start: 2024-03-17 | End: 2024-03-19

## 2024-03-17 RX ORDER — SUMATRIPTAN 50 MG/1
50 TABLET, FILM COATED ORAL 2 TIMES DAILY PRN
Status: DISCONTINUED | OUTPATIENT
Start: 2024-03-17 | End: 2024-03-19 | Stop reason: HOSPADM

## 2024-03-17 RX ADMIN — MEROPENEM 1 G: 1 INJECTION, POWDER, FOR SOLUTION INTRAVENOUS at 08:03

## 2024-03-17 RX ADMIN — METRONIDAZOLE 500 MG: 5 INJECTION, SOLUTION INTRAVENOUS at 05:03

## 2024-03-17 RX ADMIN — PROCHLORPERAZINE EDISYLATE 5 MG: 5 INJECTION INTRAMUSCULAR; INTRAVENOUS at 05:03

## 2024-03-17 RX ADMIN — ACETAMINOPHEN 650 MG: 325 TABLET ORAL at 03:03

## 2024-03-17 RX ADMIN — ENOXAPARIN SODIUM 40 MG: 40 INJECTION SUBCUTANEOUS at 08:03

## 2024-03-17 RX ADMIN — SODIUM CHLORIDE 500 ML: 9 INJECTION, SOLUTION INTRAVENOUS at 02:03

## 2024-03-17 RX ADMIN — METHOCARBAMOL TABLETS 750 MG: 750 TABLET, COATED ORAL at 04:03

## 2024-03-17 RX ADMIN — CEFTRIAXONE SODIUM 2 G: 2 INJECTION, POWDER, FOR SOLUTION INTRAMUSCULAR; INTRAVENOUS at 06:03

## 2024-03-17 RX ADMIN — SUMATRIPTAN SUCCINATE 50 MG: 50 TABLET ORAL at 09:03

## 2024-03-17 RX ADMIN — MEROPENEM 1 G: 1 INJECTION, POWDER, FOR SOLUTION INTRAVENOUS at 12:03

## 2024-03-17 RX ADMIN — SUMATRIPTAN SUCCINATE 50 MG: 50 TABLET ORAL at 08:03

## 2024-03-17 RX ADMIN — ENOXAPARIN SODIUM 40 MG: 40 INJECTION SUBCUTANEOUS at 09:03

## 2024-03-17 RX ADMIN — ONDANSETRON 8 MG: 4 TABLET, ORALLY DISINTEGRATING ORAL at 12:03

## 2024-03-17 RX ADMIN — PANTOPRAZOLE SODIUM 40 MG: 40 INJECTION, POWDER, LYOPHILIZED, FOR SOLUTION INTRAVENOUS at 09:03

## 2024-03-17 NOTE — ASSESSMENT & PLAN NOTE
Blood culture x2 sets (+) GNR  Rapid Organism ID by PCR (from Blood culture) (+) E coli and Enterobacterales   Continue IV Rocephin  Consult infectious disease for recommendations  Await final cultures

## 2024-03-17 NOTE — PROGRESS NOTES
Pharmacist Renal Dose Adjustment Note    Yenifer Chatterjee is a 58 y.o. female being treated with the medication enoxaparin    Patient Data:    Vital Signs (Most Recent):  Temp: 98.3 °F (36.8 °C) (03/17/24 0818)  Pulse: 84 (03/17/24 0818)  Resp: 17 (03/17/24 0818)  BP: (!) 108/55 (03/17/24 0818)  SpO2: 96 % (03/17/24 0818) Vital Signs (72h Range):  Temp:  [96.5 °F (35.8 °C)-99 °F (37.2 °C)]   Pulse:  []   Resp:  [16-24]   BP: ()/(41-63)   SpO2:  [95 %-100 %]      Recent Labs   Lab 03/16/24  0617   CREATININE 0.8     Serum creatinine: 0.8 mg/dL 03/16/24 0617  Estimated creatinine clearance: 89.1 mL/min    Enoxaparin 40 mg qd will change to enoxaparin 40 mg bid (BMI>40)    Pharmacist's Name: Arun Mcallister  Pharmacist's Extension: 3044

## 2024-03-17 NOTE — PROGRESS NOTES
[facility] Medicine  Progress Note    Patient Name: Yenifer Chatterjee  MRN: [id number]  Patient Class: IP- Inpatient   Admission Date: 3/16/2024  Length of Stay: 1 days  Attending Physician: Kb Tee MD  Primary Care Provider: Bing, Primary Doctor        Subjective:     Principal Problem:Severe sepsis        HPI:  Ms. Chatterjee is a 58-year-old female with a past medical history of nephrolithiasis.  She was in her usual state of health when she started having abdominal pain, nausea/vomiting, and diarrhea.  The pain got progressively worse and diffuse to her back/trunk in uterus area.  She was concerned about having recurrent infection and kidney stone so she came to the hospital.  While in the ED, she had a CT of the abdomen/pelvis with IV contrast and it was negative.  She met sepsis criteria and was given IV Rocephin for possible UTI.  Her lactic acid came back elevated and she also was mildly hypotensive and bolused with IV NS.  She is being admitted to the hospital for further treatment and management.  She currently reports her nausea and abdominal pain are much better.  Still having right lower quadrant tenderness.  She report it is uterus pain. No diarrhea since being in the hospital.  Complains of having headache currently.    Overview/Hospital Course:  No notes on file    Interval History:  Sitting up on couch in room.  Attempted soft diet last night, but did not tolerate due to nausea.  Currently complaining of headache and back pain.  Currently upset due to having a recurrence of sepsis, had sepsis a couple years ago due to renal stone and almost .  Hypotension better this morning and remains mildly hypotensive.  Blood culture growing E coli species and enterobacterales per rapid ID.  Blood culture now positive GNR x2 sets.  No BM/diarrhea since being in the hospital and unable to collect stool specimen.      Review of Systems   Constitutional:  Positive for activity change  and appetite change. Negative for chills and fever.   Respiratory:  Negative for cough and shortness of breath.    Cardiovascular:  Negative for chest pain and palpitations.   Gastrointestinal:  Positive for nausea. Negative for abdominal pain, diarrhea and vomiting.   Genitourinary:  Negative for dysuria and hematuria.   Musculoskeletal:  Positive for back pain. Negative for gait problem.   Skin:  Negative for color change and wound.   Neurological:  Positive for headaches. Negative for dizziness and weakness.   Psychiatric/Behavioral:  Positive for dysphoric mood. The patient is nervous/anxious.      Objective:     Vital Signs (Most Recent):  Temp: 98.3 °F (36.8 °C) (03/17/24 0818)  Pulse: 84 (03/17/24 0818)  Resp: 17 (03/17/24 0818)  BP: (!) 108/55 (03/17/24 0818)  SpO2: 96 % (03/17/24 0818) Vital Signs (24h Range):  Temp:  [96.5 °F (35.8 °C)-98.3 °F (36.8 °C)] 98.3 °F (36.8 °C)  Pulse:  [] 84  Resp:  [16-24] 17  SpO2:  [95 %-100 %] 96 %  BP: ()/(41-59) 108/55     Weight: 106.6 kg (235 lb 0.2 oz)  Body mass index is 40.34 kg/m².    Intake/Output Summary (Last 24 hours) at 3/17/2024 0900  Last data filed at 3/17/2024 0632  Gross per 24 hour   Intake --   Output 400 ml   Net -400 ml         Physical Exam  Constitutional:       General: She is not in acute distress.  HENT:      Head: Normocephalic and atraumatic.   Eyes:      Extraocular Movements: Extraocular movements intact.      Conjunctiva/sclera: Conjunctivae normal.      Pupils: Pupils are equal, round, and reactive to light.   Cardiovascular:      Rate and Rhythm: Normal rate and regular rhythm.      Pulses: Normal pulses.      Heart sounds: Normal heart sounds.      Comments: NSR on telemetry  Pulmonary:      Effort: Pulmonary effort is normal. No respiratory distress.      Breath sounds: Normal breath sounds.   Abdominal:      General: Bowel sounds are normal. There is no distension.      Palpations: Abdomen is soft.      Tenderness: There is  no abdominal tenderness.   Musculoskeletal:         General: No swelling. Normal range of motion.      Cervical back: Normal range of motion and neck supple.   Skin:     General: Skin is warm and dry.      Capillary Refill: Capillary refill takes less than 2 seconds.   Neurological:      General: No focal deficit present.      Mental Status: She is alert and oriented to person, place, and time.      Cranial Nerves: No cranial nerve deficit.   Psychiatric:         Attention and Perception: Attention normal.         Mood and Affect: Mood is depressed. Affect is tearful.             Significant Labs: All pertinent labs within the past 24 hours have been reviewed.  CBC:   Recent Labs   Lab 03/16/24  0617 03/17/24  0843   WBC 6.24 14.56*   HGB 13.5 10.8*   HCT 41.1 33.2*    105*     CMP:   Recent Labs   Lab 03/16/24  0617 03/17/24  0844    139   K 4.2 4.3    110   CO2 21* 20*    103   BUN 26* 17   CREATININE 0.8 0.8   CALCIUM 9.5 7.8*   PROT 7.1 5.4*   ALBUMIN 4.0 2.8*   BILITOT 0.7 0.7   ALKPHOS 105 69   AST 13 15   ALT 16 15   ANIONGAP 12 9     Lactic Acid:   Recent Labs   Lab 03/16/24  0954 03/16/24  1439 03/17/24  0844   LACTATE 3.2* 2.2 1.6     Microbiology Results (last 7 days)       Procedure Component Value Units Date/Time    Blood culture x two cultures. Draw prior to antibiotics. [[id number]] Collected: 03/16/24 0624    Order Status: Completed Specimen: Blood from Peripheral, Antecubital, Left Updated: 03/16/24 2343     Blood Culture, Routine Gram stain aer bottle: Gram negative rods      Gram stain alta bottle: Gram negative rods      Positive results previously called TO: Faviola Handley RN      03/16/2024  23:42 TGC    Narrative:      Aerobic and anaerobic    Rapid Organism ID by PCR (from Blood culture) [[id number]]  (Abnormal) Collected: 03/16/24 0617    Order Status: Completed Updated: 03/16/24 3213     Enterococcus faecalis Not Detected     Enterococcus faecium Not Detected      Listeria monocytogenes Not Detected     Staphylococcus spp. Not Detected     Staphylococcus aureus Not Detected     Staphylococcus epidermidis Not Detected     Staphylococcus lugdunensis Not Detected     Streptococcus species Not Detected     Streptococcus agalactiae Not Detected     Streptococcus pneumoniae Not Detected     Streptococcus pyogenes Not Detected     Acinetobacter calcoaceticus/baumannii complex Not Detected     Bacteroides fragilis Not Detected     Enterobacterales See species for ID     Enterobacter cloacae complex Not Detected     Escherichia coli Detected     Klebsiella aerogenes Not Detected     Klebsiella oxytoca Not Detected     Klebsiella pneumoniae group Not Detected     Proteus Not Detected     Salmonella sp Not Detected     Serratia marcescens Not Detected     Haemophilus influenzae Not Detected     Neisseria meningtidis Not Detected     Pseudomonas aeruginosa Not Detected     Stenotrophomonas maltophilia Not Detected     Candida albicans Not Detected     Candida auris Not Detected     Candida glabrata Not Detected     Candida krusei Not Detected     Candida parapsilosis Not Detected     Candida tropicalis Not Detected     Cryptococcus neoformans/gattii Not Detected     CTX-M (ESBL ) Detected     IMP (Carbapenem resistant) Not Detected     KPC resistance gene (Carbapenem resistant) Not Detected     mcr-1  Not Detected     mec A/C  Test not applicable     mec A/C and MREJ (MRSA) gene Test not applicable     NDM (Carbapenem resistant) Not Detected     OXA-48-like (Carbapenem resistant) Not Detected     van A/B (VRE gene) Test not applicable     VIM (Carbapenem resistant) Not Detected    Narrative:      Aerobic and anaerobic    Blood culture x two cultures. Draw prior to antibiotics. [[id number]] Collected: 03/16/24 0617    Order Status: Completed Specimen: Blood from Peripheral, Antecubital, Right Updated: 03/16/24 1514     Blood Culture, Routine Gram stain aer bottle: Gram negative  rods      Gram stain alta bottle: Gram negative rods      Results called to and read back by:Faviola Handley RN  03/16/2024  15:13      JBM    Narrative:      Aerobic and anaerobic    Giardia Specific Antigen [[id number]]     Order Status: No result Specimen: Stool     Clostridium difficile EIA [[id number]]     Order Status: No result Specimen: Stool     Stool culture [[id number]]     Order Status: No result Specimen: Stool               Significant Imaging: I have reviewed all pertinent imaging results/findings within the past 24 hours.    Assessment/Plan:      * Severe sepsis  This patient does have evidence of infective focus  My overall impression is  severe sepsis .  Initially presented as early sepsis, now with leukocytosis, tachycardia, hypotension, and bacteremia.  Source: Abdominal and bacteremia  Antibiotics given-   Antibiotics (72h ago, onward)      Start     Stop Route Frequency Ordered    03/18/24 0630  cefTRIAXone (ROCEPHIN) 2 g in dextrose 5 % in water (D5W) 100 mL IVPB (MB+)         -- IV Every 24 hours (non-standard times) 03/17/24 0829    03/16/24 1400  metronidazole IVPB 500 mg  (Intra-abdominal Infection)         03/22/24 1359 IV Every 8 hours 03/16/24 1244          Latest lactate reviewed-  Recent Labs   Lab 03/16/24  0617 03/16/24  0954 03/16/24  1439   LACTATE  --    < > 2.2   POCLAC 3.39*  --   --     < > = values in this interval not displayed.       Organ dysfunction:  None    Fluid challenge:  Total of 3L IV bolus fluid given an ED    Post- resuscitation assessment :  Hypotension improved      Will Not start Pressors- Levophed for MAP of 65  Source control achieved by:  Treating with antibiotic.  ------------------------------------------------------------------  Consult infectious disease for recommendations  Continue IV antibiotics and IV fluid hydration  Check lactic acid, CRP, and procalcitonin levels    Acute gastroenteritis  No further vomiting or diarrhea.  Nausea persists.  Not  tolerating advanced diet-- Maintain clear liquid diet and advance when appropriate  Continue IV fluid hydration, IV Rocephin/Flagyl for possible bacterial etiology  Stool studies to evaluate for infectious process pending/no BM since being in the hospital  Continue IV antiemetics PRN  Start Protonix IV daily    Acute headache  Imitrex PRN  Monitor      Bacteremia  Blood culture x2 sets (+) GNR  Rapid Organism ID by PCR (from Blood culture) (+) E coli and Enterobacterales   Continue IV Rocephin  Consult infectious disease for recommendations  Await final cultures    Dehydration  Due to nausea/vomiting and diarrhea  Evidenced by tachycardia and hypotension  Continue IV fluid hydration  Continue IV antiemetics PRN      Hypotension  Starting to improve but remains mildly hypotensive/ Due to sepsis   Continue IV fluid for perfusion  Continue telemetry monitoring         VTE Risk Mitigation (From admission, onward)           Ordered     enoxaparin injection 40 mg  Every 12 hours         03/17/24 0918     IP VTE HIGH RISK PATIENT  Once         03/17/24 0918     Place sequential compression device  Until discontinued         03/16/24 1244     Place SALEEM hose  Until discontinued         03/16/24 1244                    Discharge Planning   ROCIO:      Code Status: Full Code   Is the patient medically ready for discharge?:     Reason for patient still in hospital (select all that apply): Patient unstable, Patient trending condition, and Treatment  Discharge Plan A: Home with family                  Belen Donald NP  Department of Hospital Medicine   [facility]/Surg

## 2024-03-17 NOTE — ASSESSMENT & PLAN NOTE
This patient does have evidence of infective focus  My overall impression is  severe sepsis .  Initially presented as early sepsis, now with leukocytosis, tachycardia, hypotension, and bacteremia.  Source: Abdominal and bacteremia  Antibiotics given-   Antibiotics (72h ago, onward)      Start     Stop Route Frequency Ordered    03/18/24 0630  cefTRIAXone (ROCEPHIN) 2 g in dextrose 5 % in water (D5W) 100 mL IVPB (MB+)         -- IV Every 24 hours (non-standard times) 03/17/24 0829    03/16/24 1400  metronidazole IVPB 500 mg  (Intra-abdominal Infection)         03/22/24 1359 IV Every 8 hours 03/16/24 1244          Latest lactate reviewed-  Recent Labs   Lab 03/16/24  0617 03/16/24  0954 03/16/24  1439   LACTATE  --    < > 2.2   POCLAC 3.39*  --   --     < > = values in this interval not displayed.       Organ dysfunction:  None    Fluid challenge:  Total of 3L IV bolus fluid given an ED    Post- resuscitation assessment :  Hypotension improved      Will Not start Pressors- Levophed for MAP of 65  Source control achieved by:  Treating with antibiotic.  ------------------------------------------------------------------  Consult infectious disease for recommendations  Continue IV antibiotics and IV fluid hydration  Check lactic acid, CRP, and procalcitonin levels

## 2024-03-17 NOTE — NURSING
Emesis x1 after bland diet.  Diet changed back to clear liquid.  Continues to complain of HA unrelieved by Tylenol.  BP 86/52.  A Ghassan, NP notified.  NS bolus 500cc given.  F/U BP 88/49.  OSMANI Ferrell NP notified.  Second bolus of NS 500cc ordered and started.

## 2024-03-17 NOTE — NURSING
Arrives to room 311 AAOX 4 VSS afebrile denies pain denies nausea KABA well skin W&D to touch no acute distress noted at this time

## 2024-03-17 NOTE — CARE UPDATE
Antibiotics changed to IV Merrem for ESBL coverage per ID.  Currently states her back pain is better and is now having neck pain, which she attributes to poor positioning while lying in the bed.  Will order Robaxin PRN.  Nausea currently controlled.

## 2024-03-17 NOTE — CONSULTS
Consult Note  Infectious Disease    Reason for Consult:      HPI: Yenifer Chatterjee is a 58 y.o. female with PMHx of obesity, BMI 40,  right kidney stones, multiple urological procedure, cystoscopy, right stent placement, pyelonephritis and Klebsiella bacteremia September 2022,( saw Dr. Pak)  came to emergency room complaining of nausea vomiting,  left lower abdominal/pelvic pain, feeling poorly, general malaise, progressively worse, that woke her up from sleep at 3:00 a.m. she had 3 loose stools since then.  One while at home and to while here in the hospital.    She feels about the same when she had prior urosepsis in 2022  She had received a steroid injection in her left knee, 4 days prior to symptoms   She had eaten seafood with multiple friends and family members the day before the symptoms.  She has been dealing with UTIs over the past few months.  She does not remember the courses of antibiotics.(doxycycline and ciprofloxacin sound familiar)    Workup had shown:  UA with pyuria, normal chest x-ray, CT abdomen nothing acute.  Patient was diagnosed with sepsis of probable urinary origin, and was placed on ceftriaxone and metronidazole.  Her blood culture is showing E coli , ESBL .  I reviewed chart earlier in the morning and changed antibiotics to meropenem  I saw patient later in the day.  She still feels poorly.  She was nauseous and vomited.  She felt better after vomiting.  She tries to eat but can not keep much down.  Most of her abdominal discomfort is over left lower quadrant.  She has no burning with urination She is afebrile.  T-max was 99°.    WBC 6-- 14.5   CRP 11--243.    Procalcitonin 16.78   hemoglobin 13.5--10.8, I am having some loose stools Lactic acid 2.2--1.6      Antibiotics (From admission, onward)      Start     Stop Route Frequency Ordered    03/17/24 1245  meropenem (MERREM) 1 g in sodium chloride 0.9 % 100 mL IVPB (MB+)         -- IV Every 8 hours (non-standard times) 03/17/24  1134           Antifungals (From admission, onward)      None          Antivirals (From admission, onward)      None              Review of patient's allergies indicates:  No Known Allergies  No past medical history on file.  Past Surgical History:   Procedure Laterality Date    CYSTOSCOPY N/A 9/23/2022    Procedure: CYSTOSCOPY;  Surgeon: Woo Ramirez MD;  Location: [facility] OR [address] [place] [address];  Service: Urology;  Laterality: N/A;    CYSTOSCOPY W/ URETERAL STENT PLACEMENT Right 9/4/2022    Procedure: CYSTOSCOPY, WITH URETERAL STENT INSERTION;  Surgeon: Ree Pak MD;  Location: [facility] OR [address] [place] [address];  Service: Urology;  Laterality: Right;    LASER LITHOTRIPSY Right 9/23/2022    Procedure: LITHOTRIPSY, USING LASER;  Surgeon: Woo Ramirez MD;  Location: [facility] OR [address] [place] [address];  Service: Urology;  Laterality: Right;    PYELOSCOPY Right 9/23/2022    Procedure: PYELOSCOPY;  Surgeon: Woo Ramirez MD;  Location: [address];  Service: Urology;  Laterality: Right;    RETROGRADE PYELOGRAPHY Right 9/4/2022    Procedure: PYELOGRAM, RETROGRADE;  Surgeon: Ree Pak MD;  Location: [address];  Service: Urology;  Laterality: Right;    RETROGRADE PYELOGRAPHY Right 9/23/2022    Procedure: PYELOGRAM, RETROGRADE;  Surgeon: Woo Ramirez MD;  Location: [address];  Service: Urology;  Laterality: Right;    URETEROSCOPIC REMOVAL OF URETERIC CALCULUS Right 9/23/2022    Procedure: REMOVAL, CALCULUS, URETER, URETEROSCOPIC;  Surgeon: Woo Ramirez MD;  Location: [address];  Service: Urology;  Laterality: Right;    URETEROSCOPY Right 9/23/2022    Procedure: URETEROSCOPY;  Surgeon: Woo Ramirez MD;  Location: [address];  Service: Urology;  Laterality: Right;     Social History     Socioeconomic History    Marital status:    Tobacco Use    Smoking status: Never    Smokeless tobacco: Never   Substance and Sexual Activity    Alcohol use: No    Drug use: No    Sexual activity: Yes     Partners: Male  "    Family History   Problem Relation Age of Onset    Diabetes Mother     Hypertension Mother     Diabetes Father     Hypertension Father          Review of Systems:   She felt hot and cold.    No weight change.    No change in vision, loss of vision or diplopia  No sinus congestion, purulent nasal discharge, post nasal drip or facial pain  No pain in mouth or throat. No problems with teeth, gums.  No chest pain, palpitations, syncope.  She was however tachycardic when in emergency room  No cough, sputum production, shortness of breath, dyspnea on exertion, pleurisy, hemoptysis  Positive for nausea vomiting and diarrhea as above.  Stools had no blood.  No mucus.  No dysuria, hematuria, strangury, retention, incontinence, nocturia, prostatism,   No risk for STDs.  No genital ulcers.  She does feel like "the inside maybe coming out."  She is looking for a gyn.  No swelling of joints, redness of joints, injuries, or new focal pain  No unusual headaches, dizziness, vertigo, numbness, paresthesias, neuropathy, falls  No anxiety, depression, substance abuse, sleep disturbance  No bleeding, lymphadenopathy, anemia, malignancy, unusual bruising  No new rashes, lesions, or wounds  No TB exposure  Uncertain of steroids received.  She was seen at urgent care and was given a tapering down dose of an medication.  She also received left joint steroid injection about 4 days POA  Outdoor activities:  Travel:  She was not dyspnea world in November 2023.  While there every family member was diagnosed with influenza.  Patient was diagnosed with a stomach virus at the time and was given antibiotics.  Implants:   Antibiotic History:   ?  Doxycycline  ?  Ciprofloxacin   ?  Diflucan       EXAM & DIAGNOSTICS REVIEWED:   Vitals:     Temp:  [97.9 °F (36.6 °C)-98.7 °F (37.1 °C)]   Temp: 98.3 °F (36.8 °C) (03/17/24 1934)  Pulse: 92 (03/17/24 1934)  Resp: 18 (03/17/24 1934)  BP: 133/72 (03/17/24 1934)  SpO2: 95 % (03/17/24 " 1934)    Intake/Output Summary (Last 24 hours) at 3/17/2024 1957  Last data filed at 3/17/2024 1755  Gross per 24 hour   Intake 1150 ml   Output 1250 ml   Net -100 ml       General:  In NAD. Alert and attentive, cooperative, comfortable  Eyes:  Anicteric, PERRL, EOMI  ENT:  No ulcers, exudates, thrush, nares patent, dentition is fair  Neck:  supple, no masses or adenopathy appreciated  Lungs: Clear, no consolidation, rales, wheezes, rub  Heart:  RRR, no gallop/murmur/rub noted  Abd:  Soft, slightly tender to deep palpation in the left lower quadrant.  ND, normal BS, no masses or organomegaly appreciated.  :  External genitalia with minimal irritation, erythema due to urinary incontinence.  Musc:  Joints without effusion, swelling, erythema, synovitis, muscle wasting.   Skin:  No rashes. No palmar or plantar lesions. No subungual petechiae  Neuro:             Alert, attentive, speech fluent, face symmetric, moves all extremities, no focal weakness. Ambulatory  Psych: Calm, cooperative, conversant  Lymphatic:     No cervical, supraclavicular, axillary, or inguinal nodes  Extrem: No edema, erythema, phlebitis, cellulitis, warm and well perfused  VAD:       Isolation:    Wound:       Lines/Tubes/Drains:  Peripheral IV    General Labs reviewed:  Recent Labs   Lab 03/16/24  0617 03/17/24  0843   WBC 6.24 14.56*   HGB 13.5 10.8*   HCT 41.1 33.2*    105*       Recent Labs   Lab 03/16/24  0617 03/17/24  0844    139   K 4.2 4.3    110   CO2 21* 20*   BUN 26* 17   CREATININE 0.8 0.8   CALCIUM 9.5 7.8*   PROT 7.1 5.4*   BILITOT 0.7 0.7   ALKPHOS 105 69   ALT 16 15   AST 13 15     Recent Labs   Lab 03/16/24  0954 03/17/24  0844   CRP 11.0* 243.4*         Micro:  Microbiology Results (last 7 days)       Procedure Component Value Units Date/Time    Blood culture x two cultures. Draw prior to antibiotics. [[id number]]  (Abnormal) Collected: 03/16/24 0624    Order Status: Completed Specimen: Blood from  Peripheral, Antecubital, Left Updated: 03/17/24 1216     Blood Culture, Routine Gram stain aer bottle: Gram negative rods      Gram stain alta bottle: Gram negative rods      Positive results previously called TO: Faviola Handley RN      03/16/2024  23:42 TGC      ESCHERICHIA COLI  For susceptibility see order #[id number]      Narrative:      Aerobic and anaerobic    Blood culture x two cultures. Draw prior to antibiotics. [[id number]]  (Abnormal) Collected: 03/16/24 0617    Order Status: Completed Specimen: Blood from Peripheral, Antecubital, Right Updated: 03/17/24 0959     Blood Culture, Routine Gram stain aer bottle: Gram negative rods      Gram stain alta bottle: Gram negative rods      Results called to and read back by:Faviola Handley RN  03/16/2024  15:13      JBM      ESCHERICHIA COLI  susceptibility pending      Narrative:      Aerobic and anaerobic    Rapid Organism ID by PCR (from Blood culture) [[id number]]  (Abnormal) Collected: 03/16/24 0617    Order Status: Completed Updated: 03/16/24 1749     Enterococcus faecalis Not Detected     Enterococcus faecium Not Detected     Listeria monocytogenes Not Detected     Staphylococcus spp. Not Detected     Staphylococcus aureus Not Detected     Staphylococcus epidermidis Not Detected     Staphylococcus lugdunensis Not Detected     Streptococcus species Not Detected     Streptococcus agalactiae Not Detected     Streptococcus pneumoniae Not Detected     Streptococcus pyogenes Not Detected     Acinetobacter calcoaceticus/baumannii complex Not Detected     Bacteroides fragilis Not Detected     Enterobacterales See species for ID     Enterobacter cloacae complex Not Detected     Escherichia coli Detected     Klebsiella aerogenes Not Detected     Klebsiella oxytoca Not Detected     Klebsiella pneumoniae group Not Detected     Proteus Not Detected     Salmonella sp Not Detected     Serratia marcescens Not Detected     Haemophilus influenzae Not Detected     Neisseria  meningtidis Not Detected     Pseudomonas aeruginosa Not Detected     Stenotrophomonas maltophilia Not Detected     Candida albicans Not Detected     Candida auris Not Detected     Candida glabrata Not Detected     Candida krusei Not Detected     Candida parapsilosis Not Detected     Candida tropicalis Not Detected     Cryptococcus neoformans/gattii Not Detected     CTX-M (ESBL ) Detected     IMP (Carbapenem resistant) Not Detected     KPC resistance gene (Carbapenem resistant) Not Detected     mcr-1  Not Detected     mec A/C  Test not applicable     mec A/C and MREJ (MRSA) gene Test not applicable     NDM (Carbapenem resistant) Not Detected     OXA-48-like (Carbapenem resistant) Not Detected     van A/B (VRE gene) Test not applicable     VIM (Carbapenem resistant) Not Detected    Narrative:      Aerobic and anaerobic    Giardia Specific Antigen [[id number]]     Order Status: No result Specimen: Stool     Clostridium difficile EIA [[id number]]     Order Status: Canceled Specimen: Stool     Stool culture [[id number]]     Order Status: No result Specimen: Stool           Lab 03/16/24  0622   COLORU Colorless*   APPEARANCEUA Clear   PHUR 6.0   SPECGRAV 1.010   PROTEINUA Negative   GLUCUA Negative   KETONESU Negative   BILIRUBINUA Negative   OCCULTUA Negative   NITRITE Negative   UROBILINOGEN Negative   LEUKOCYTESUR Trace*   RBCUA 2   WBCUA 10*   BACTERIA Few*   SQUAMEPITHEL 0       Imaging Reviewed:   CXR NAPD   CT  Lung bases are clear.  Normal sized heart.  Unremarkable gallbladder.  There are a few simple cysts along the left kidney cortex and pelvis.  Fatty atrophy along the proximal pancreas.  Remaining solid abdominal organs are unremarkable.  There is no enteric contrast which limits bowel assessment.  No dilated bowel loops.  There are a few scattered colonic diverticula.  Normal appendix.  Small fat containing umbilical defect.  Atrophied uterus and decompressed urinary bladder.  Mild multilevel spinal  degenerative change.  Severe left hip degenerative change.  Impression: No acute findings in the abdomen.    Cardiology:      IMPRESSION & PLAN   ESBL bacteremia,  probably from urinary origin, given that she has history of the same and UA had mild pyuria on admission.  Another possibility would be GI origin.  Pain location is over the left lower quadrant.  CT abdomen did not  any colitis, but it had no oral contrast. Patient is due for a colonoscopy, prior colonoscopy was 8 years ago.  Elevated CRP, procalcitonin,   Leukocytosis, anemia, thrombocytopenia.    Obesity.  BMI of 40.        Recommendations:  Change abx to meropenem  Repeat blood cultures  Monitor  CRP, procalcitonin, WBC,   Monitor platelets!  Stool studies if diarrhea reoccurs.  Colonoscopy as outpatient    Repeat labs tonight    Medical Decision Making during this encounter was  [_] Low Complexity  [_] Moderate Complexity  [ xx ] High Complexity

## 2024-03-17 NOTE — NURSING
Nurses Note -- 4 Eyes      3/16/2024   7:57 PM      Skin assessed during: Admit      [x] No Altered Skin Integrity Present    []Prevention Measures Documented      [] Yes- Altered Skin Integrity Present or Discovered   [] LDA Added if Not in Epic (Describe Wound)   [] New Altered Skin Integrity was Present on Admit and Documented in LDA   [] Wound Image Taken    Wound Care Consulted? No    Attending Nurse:adali Willoughby RN/Staff Member:   Kayla oro

## 2024-03-17 NOTE — PLAN OF CARE
Initial assessment completed with patient at bedside with significant other Sam Hurtado present. AAOx4. SW verified facesheet information. Reports PCP is Dr. Barajas whom she last saw on 3/11/24. Patient is reportedly independent with all ADLs and does not use any DME at home. No HH, dialysis, or coumadin. Patient reports she is not currently on any prescription medications. Plans to discharge home with significant other who will provide transportation. No discharge needs anticipated.    03/17/24 1126   Discharge Assessment   Assessment Type Discharge Planning Assessment   Confirmed/corrected address, phone number and insurance Yes   Confirmed Demographics Correct on Facesheet   Source of Information patient;other (see comments)  (Remy Rossiner - significant other)   When was your last doctors appointment? 03/11/24  (PCP Dr. Barajas)   Communicated ROCIO with patient/caregiver Date not available/Unable to determine   Reason For Admission Severe sepsis   People in Home significant other   Facility Arrived From: Home   Do you expect to return to your current living situation? Yes   Do you have help at home or someone to help you manage your care at home? Yes   Who are your caregiver(s) and their phone number(s)? Significant other Remy Hurtado, if needed. [phone]   Prior to hospitilization cognitive status: No Deficits;Alert/Oriented   Current cognitive status: Alert/Oriented;No Deficits   Walking or Climbing Stairs Difficulty no   Dressing/Bathing Difficulty no   Do you have any problems with:   (N/A)   Equipment Currently Used at Home none   Readmission within 30 days? No   Patient currently being followed by outpatient case management? No   Do you currently have service(s) that help you manage your care at home? No   Do you take prescription medications? No   Do you have prescription coverage? Yes   Coverage [employer]   Is the patient taking medications as prescribed?   (N/A)   Who is going to help you get home  at discharge? Remy Hurtado - significant other   How do you get to doctors appointments? car, drives self   Are you on dialysis? No   Do you take coumadin? No   Discharge Plan A Home with family   Discharge Plan B Home with family   DME Needed Upon Discharge  none   Discharge Plan discussed with: Patient;Spouse/sig other   Name(s) and Number(s) See above   Transition of Care Barriers None   OTHER   Name(s) of People in Home Remy Hurtado

## 2024-03-17 NOTE — ASSESSMENT & PLAN NOTE
Due to nausea/vomiting and diarrhea  Evidenced by tachycardia and hypotension  Continue IV fluid hydration  Continue IV antiemetics PRN

## 2024-03-17 NOTE — SUBJECTIVE & OBJECTIVE
Interval History:  Sitting up on couch in room.  Attempted soft diet last night, but did not tolerate due to nausea.  Currently complaining of headache and back pain.  Currently upset due to having a recurrence of sepsis, had sepsis a couple years ago due to renal stone and almost .  Hypotension better this morning and remains mildly hypotensive.  Blood culture growing E coli species and enterobacterales per rapid ID.  Blood culture now positive GNR x2 sets.  No BM/diarrhea since being in the hospital and unable to collect stool specimen.      Review of Systems   Constitutional:  Positive for activity change and appetite change. Negative for chills and fever.   Respiratory:  Negative for cough and shortness of breath.    Cardiovascular:  Negative for chest pain and palpitations.   Gastrointestinal:  Positive for nausea. Negative for abdominal pain, diarrhea and vomiting.   Genitourinary:  Negative for dysuria and hematuria.   Musculoskeletal:  Positive for back pain. Negative for gait problem.   Skin:  Negative for color change and wound.   Neurological:  Positive for headaches. Negative for dizziness and weakness.   Psychiatric/Behavioral:  Positive for dysphoric mood. The patient is nervous/anxious.      Objective:     Vital Signs (Most Recent):  Temp: 98.3 °F (36.8 °C) (24)  Pulse: 84 (24)  Resp: 17 (24)  BP: (!) 108/55 (24)  SpO2: 96 % (24) Vital Signs (24h Range):  Temp:  [96.5 °F (35.8 °C)-98.3 °F (36.8 °C)] 98.3 °F (36.8 °C)  Pulse:  [] 84  Resp:  [16-24] 17  SpO2:  [95 %-100 %] 96 %  BP: ()/(41-59) 108/55     Weight: 106.6 kg (235 lb 0.2 oz)  Body mass index is 40.34 kg/m².    Intake/Output Summary (Last 24 hours) at 3/17/2024 0900  Last data filed at 3/17/2024 0632  Gross per 24 hour   Intake --   Output 400 ml   Net -400 ml         Physical Exam  Constitutional:       General: She is not in acute distress.  HENT:      Head:  Normocephalic and atraumatic.   Eyes:      Extraocular Movements: Extraocular movements intact.      Conjunctiva/sclera: Conjunctivae normal.      Pupils: Pupils are equal, round, and reactive to light.   Cardiovascular:      Rate and Rhythm: Normal rate and regular rhythm.      Pulses: Normal pulses.      Heart sounds: Normal heart sounds.      Comments: NSR on telemetry  Pulmonary:      Effort: Pulmonary effort is normal. No respiratory distress.      Breath sounds: Normal breath sounds.   Abdominal:      General: Bowel sounds are normal. There is no distension.      Palpations: Abdomen is soft.      Tenderness: There is no abdominal tenderness.   Musculoskeletal:         General: No swelling. Normal range of motion.      Cervical back: Normal range of motion and neck supple.   Skin:     General: Skin is warm and dry.      Capillary Refill: Capillary refill takes less than 2 seconds.   Neurological:      General: No focal deficit present.      Mental Status: She is alert and oriented to person, place, and time.      Cranial Nerves: No cranial nerve deficit.   Psychiatric:         Attention and Perception: Attention normal.         Mood and Affect: Mood is depressed. Affect is tearful.             Significant Labs: All pertinent labs within the past 24 hours have been reviewed.  CBC:   Recent Labs   Lab 03/16/24  0617 03/17/24  0843   WBC 6.24 14.56*   HGB 13.5 10.8*   HCT 41.1 33.2*    105*     CMP:   Recent Labs   Lab 03/16/24  0617 03/17/24  0844    139   K 4.2 4.3    110   CO2 21* 20*    103   BUN 26* 17   CREATININE 0.8 0.8   CALCIUM 9.5 7.8*   PROT 7.1 5.4*   ALBUMIN 4.0 2.8*   BILITOT 0.7 0.7   ALKPHOS 105 69   AST 13 15   ALT 16 15   ANIONGAP 12 9     Lactic Acid:   Recent Labs   Lab 03/16/24  0954 03/16/24  1439 03/17/24  0844   LACTATE 3.2* 2.2 1.6     Microbiology Results (last 7 days)       Procedure Component Value Units Date/Time    Blood culture x two cultures. Draw prior to  antibiotics. [[id number]] Collected: 03/16/24 0624    Order Status: Completed Specimen: Blood from Peripheral, Antecubital, Left Updated: 03/16/24 2343     Blood Culture, Routine Gram stain aer bottle: Gram negative rods      Gram stain alta bottle: Gram negative rods      Positive results previously called TO: Faviola Handley RN      03/16/2024  23:42 TGC    Narrative:      Aerobic and anaerobic    Rapid Organism ID by PCR (from Blood culture) [[id number]]  (Abnormal) Collected: 03/16/24 0617    Order Status: Completed Updated: 03/16/24 1749     Enterococcus faecalis Not Detected     Enterococcus faecium Not Detected     Listeria monocytogenes Not Detected     Staphylococcus spp. Not Detected     Staphylococcus aureus Not Detected     Staphylococcus epidermidis Not Detected     Staphylococcus lugdunensis Not Detected     Streptococcus species Not Detected     Streptococcus agalactiae Not Detected     Streptococcus pneumoniae Not Detected     Streptococcus pyogenes Not Detected     Acinetobacter calcoaceticus/baumannii complex Not Detected     Bacteroides fragilis Not Detected     Enterobacterales See species for ID     Enterobacter cloacae complex Not Detected     Escherichia coli Detected     Klebsiella aerogenes Not Detected     Klebsiella oxytoca Not Detected     Klebsiella pneumoniae group Not Detected     Proteus Not Detected     Salmonella sp Not Detected     Serratia marcescens Not Detected     Haemophilus influenzae Not Detected     Neisseria meningtidis Not Detected     Pseudomonas aeruginosa Not Detected     Stenotrophomonas maltophilia Not Detected     Candida albicans Not Detected     Candida auris Not Detected     Candida glabrata Not Detected     Candida krusei Not Detected     Candida parapsilosis Not Detected     Candida tropicalis Not Detected     Cryptococcus neoformans/gattii Not Detected     CTX-M (ESBL ) Detected     IMP (Carbapenem resistant) Not Detected     KPC resistance gene  (Carbapenem resistant) Not Detected     mcr-1  Not Detected     mec A/C  Test not applicable     mec A/C and MREJ (MRSA) gene Test not applicable     NDM (Carbapenem resistant) Not Detected     OXA-48-like (Carbapenem resistant) Not Detected     van A/B (VRE gene) Test not applicable     VIM (Carbapenem resistant) Not Detected    Narrative:      Aerobic and anaerobic    Blood culture x two cultures. Draw prior to antibiotics. [[id number]] Collected: 03/16/24 0617    Order Status: Completed Specimen: Blood from Peripheral, Antecubital, Right Updated: 03/16/24 1514     Blood Culture, Routine Gram stain aer bottle: Gram negative rods      Gram stain alta bottle: Gram negative rods      Results called to and read back by:Faviola Handley RN  03/16/2024  15:13      JBM    Narrative:      Aerobic and anaerobic    Giardia Specific Antigen [[id number]]     Order Status: No result Specimen: Stool     Clostridium difficile EIA [[id number]]     Order Status: No result Specimen: Stool     Stool culture [[id number]]     Order Status: No result Specimen: Stool               Significant Imaging: I have reviewed all pertinent imaging results/findings within the past 24 hours.

## 2024-03-17 NOTE — ASSESSMENT & PLAN NOTE
Starting to improve but remains mildly hypotensive/ Due to sepsis   Continue IV fluid for perfusion  Continue telemetry monitoring

## 2024-03-17 NOTE — ASSESSMENT & PLAN NOTE
No further vomiting or diarrhea.  Nausea persists.  Not tolerating advanced diet-- Maintain clear liquid diet and advance when appropriate  Continue IV fluid hydration, IV Rocephin/Flagyl for possible bacterial etiology  Stool studies to evaluate for infectious process pending/no BM since being in the hospital  Continue IV antiemetics PRN  Start Protonix IV daily

## 2024-03-18 PROBLEM — S16.1XXA CERVICAL STRAIN, ACUTE: Status: ACTIVE | Noted: 2024-03-18

## 2024-03-18 LAB
ALBUMIN SERPL BCP-MCNC: 2.9 G/DL (ref 3.5–5.2)
ALP SERPL-CCNC: 89 U/L (ref 55–135)
ALT SERPL W/O P-5'-P-CCNC: 15 U/L (ref 10–44)
ANION GAP SERPL CALC-SCNC: 8 MMOL/L (ref 8–16)
AST SERPL-CCNC: 16 U/L (ref 10–40)
BACTERIA BLD CULT: ABNORMAL
BASOPHILS # BLD AUTO: 0.03 K/UL (ref 0–0.2)
BASOPHILS NFR BLD: 0.2 % (ref 0–1.9)
BILIRUB SERPL-MCNC: 0.7 MG/DL (ref 0.1–1)
BUN SERPL-MCNC: 12 MG/DL (ref 6–20)
CALCIUM SERPL-MCNC: 8.6 MG/DL (ref 8.7–10.5)
CHLORIDE SERPL-SCNC: 109 MMOL/L (ref 95–110)
CO2 SERPL-SCNC: 21 MMOL/L (ref 23–29)
CREAT SERPL-MCNC: 0.7 MG/DL (ref 0.5–1.4)
CRP SERPL-MCNC: 284.2 MG/L (ref 0–8.2)
DIFFERENTIAL METHOD BLD: ABNORMAL
EOSINOPHIL # BLD AUTO: 0.5 K/UL (ref 0–0.5)
EOSINOPHIL NFR BLD: 3.7 % (ref 0–8)
ERYTHROCYTE [DISTWIDTH] IN BLOOD BY AUTOMATED COUNT: 12.9 % (ref 11.5–14.5)
ERYTHROCYTE [SEDIMENTATION RATE] IN BLOOD BY WESTERGREN METHOD: 32 MM/HR (ref 0–20)
EST. GFR  (NO RACE VARIABLE): >60 ML/MIN/1.73 M^2
GLUCOSE SERPL-MCNC: 102 MG/DL (ref 70–110)
HCT VFR BLD AUTO: 35.9 % (ref 37–48.5)
HGB BLD-MCNC: 11.5 G/DL (ref 12–16)
IMM GRANULOCYTES # BLD AUTO: 0.07 K/UL (ref 0–0.04)
IMM GRANULOCYTES NFR BLD AUTO: 0.5 % (ref 0–0.5)
LYMPHOCYTES # BLD AUTO: 0.9 K/UL (ref 1–4.8)
LYMPHOCYTES NFR BLD: 6.9 % (ref 18–48)
MCH RBC QN AUTO: 30.8 PG (ref 27–31)
MCHC RBC AUTO-ENTMCNC: 32 G/DL (ref 32–36)
MCV RBC AUTO: 96 FL (ref 82–98)
MONOCYTES # BLD AUTO: 0.8 K/UL (ref 0.3–1)
MONOCYTES NFR BLD: 5.8 % (ref 4–15)
NEUTROPHILS # BLD AUTO: 11.1 K/UL (ref 1.8–7.7)
NEUTROPHILS NFR BLD: 82.9 % (ref 38–73)
NRBC BLD-RTO: 0 /100 WBC
PLATELET # BLD AUTO: 101 K/UL (ref 150–450)
PMV BLD AUTO: 12.3 FL (ref 9.2–12.9)
POTASSIUM SERPL-SCNC: 3.8 MMOL/L (ref 3.5–5.1)
PROCALCITONIN SERPL IA-MCNC: 8.1 NG/ML (ref 0–0.5)
PROT SERPL-MCNC: 5.9 G/DL (ref 6–8.4)
RBC # BLD AUTO: 3.73 M/UL (ref 4–5.4)
SODIUM SERPL-SCNC: 138 MMOL/L (ref 136–145)
WBC # BLD AUTO: 13.39 K/UL (ref 3.9–12.7)

## 2024-03-18 PROCEDURE — 94761 N-INVAS EAR/PLS OXIMETRY MLT: CPT

## 2024-03-18 PROCEDURE — 25000003 PHARM REV CODE 250: Performed by: NURSE PRACTITIONER

## 2024-03-18 PROCEDURE — 87040 BLOOD CULTURE FOR BACTERIA: CPT | Mod: 59 | Performed by: STUDENT IN AN ORGANIZED HEALTH CARE EDUCATION/TRAINING PROGRAM

## 2024-03-18 PROCEDURE — 85651 RBC SED RATE NONAUTOMATED: CPT | Performed by: INTERNAL MEDICINE

## 2024-03-18 PROCEDURE — 80053 COMPREHEN METABOLIC PANEL: CPT | Performed by: NURSE PRACTITIONER

## 2024-03-18 PROCEDURE — 36415 COLL VENOUS BLD VENIPUNCTURE: CPT | Performed by: STUDENT IN AN ORGANIZED HEALTH CARE EDUCATION/TRAINING PROGRAM

## 2024-03-18 PROCEDURE — 84145 PROCALCITONIN (PCT): CPT | Performed by: INTERNAL MEDICINE

## 2024-03-18 PROCEDURE — C9113 INJ PANTOPRAZOLE SODIUM, VIA: HCPCS | Performed by: NURSE PRACTITIONER

## 2024-03-18 PROCEDURE — 63600175 PHARM REV CODE 636 W HCPCS: Performed by: NURSE PRACTITIONER

## 2024-03-18 PROCEDURE — 85025 COMPLETE CBC W/AUTO DIFF WBC: CPT | Performed by: NURSE PRACTITIONER

## 2024-03-18 PROCEDURE — 87040 BLOOD CULTURE FOR BACTERIA: CPT | Performed by: INTERNAL MEDICINE

## 2024-03-18 PROCEDURE — 99233 SBSQ HOSP IP/OBS HIGH 50: CPT | Mod: ,,, | Performed by: STUDENT IN AN ORGANIZED HEALTH CARE EDUCATION/TRAINING PROGRAM

## 2024-03-18 PROCEDURE — 11000001 HC ACUTE MED/SURG PRIVATE ROOM

## 2024-03-18 PROCEDURE — 36415 COLL VENOUS BLD VENIPUNCTURE: CPT | Performed by: NURSE PRACTITIONER

## 2024-03-18 PROCEDURE — 63600175 PHARM REV CODE 636 W HCPCS: Performed by: INTERNAL MEDICINE

## 2024-03-18 PROCEDURE — 25000003 PHARM REV CODE 250: Performed by: INTERNAL MEDICINE

## 2024-03-18 PROCEDURE — 86140 C-REACTIVE PROTEIN: CPT | Performed by: INTERNAL MEDICINE

## 2024-03-18 RX ORDER — DIAZEPAM 5 MG/1
5 TABLET ORAL EVERY 8 HOURS PRN
Status: DISCONTINUED | OUTPATIENT
Start: 2024-03-18 | End: 2024-03-19 | Stop reason: HOSPADM

## 2024-03-18 RX ADMIN — ENOXAPARIN SODIUM 40 MG: 40 INJECTION SUBCUTANEOUS at 09:03

## 2024-03-18 RX ADMIN — METHOCARBAMOL TABLETS 750 MG: 750 TABLET, COATED ORAL at 08:03

## 2024-03-18 RX ADMIN — ACETAMINOPHEN 650 MG: 325 TABLET ORAL at 07:03

## 2024-03-18 RX ADMIN — PANTOPRAZOLE SODIUM 40 MG: 40 INJECTION, POWDER, LYOPHILIZED, FOR SOLUTION INTRAVENOUS at 08:03

## 2024-03-18 RX ADMIN — METHOCARBAMOL TABLETS 750 MG: 750 TABLET, COATED ORAL at 03:03

## 2024-03-18 RX ADMIN — MEROPENEM 1 G: 1 INJECTION, POWDER, FOR SOLUTION INTRAVENOUS at 01:03

## 2024-03-18 RX ADMIN — ACETAMINOPHEN 650 MG: 325 TABLET ORAL at 03:03

## 2024-03-18 RX ADMIN — ENOXAPARIN SODIUM 40 MG: 40 INJECTION SUBCUTANEOUS at 08:03

## 2024-03-18 RX ADMIN — DIAZEPAM 5 MG: 5 TABLET ORAL at 11:03

## 2024-03-18 RX ADMIN — MEROPENEM 1 G: 1 INJECTION, POWDER, FOR SOLUTION INTRAVENOUS at 09:03

## 2024-03-18 RX ADMIN — DIAZEPAM 5 MG: 5 TABLET ORAL at 07:03

## 2024-03-18 RX ADMIN — MEROPENEM 1 G: 1 INJECTION, POWDER, FOR SOLUTION INTRAVENOUS at 05:03

## 2024-03-18 NOTE — PROGRESS NOTES
[facility] Medicine  Progress Note    Patient Name: Yenifer Chatterjee  MRN: [id number]  Patient Class: IP- Inpatient   Admission Date: 3/16/2024  Length of Stay: 2 days  Attending Physician: Kb Tee MD  Primary Care Provider: Bing, Primary Doctor        Subjective:     Principal Problem:Severe sepsis        HPI:  Ms. Chatterjee is a 58-year-old female with a past medical history of nephrolithiasis.  She was in her usual state of health when she started having abdominal pain, nausea/vomiting, and diarrhea.  The pain got progressively worse and diffuse to her back/trunk in uterus area.  She was concerned about having recurrent infection and kidney stone so she came to the hospital.  While in the ED, she had a CT of the abdomen/pelvis with IV contrast and it was negative.  She met sepsis criteria and was given IV Rocephin for possible UTI.  Her lactic acid came back elevated and she also was mildly hypotensive and bolused with IV NS.  She is being admitted to the hospital for further treatment and management.  She currently reports her nausea and abdominal pain are much better.  Still having right lower quadrant tenderness.  She report it is uterus pain. No diarrhea since being in the hospital.  Complains of having headache currently.    Overview/Hospital Course:  No notes on file    Interval History:  Lying in bed, awake and alert.  Neck pain and stiffness worse today.  Current medications not helping.  Still with intermittent nausea.  No relation to oral intake.  Wants to try eating mashed potatoes.    Review of Systems   Constitutional:  Positive for activity change and appetite change. Negative for chills and fever.   Respiratory:  Negative for cough and shortness of breath.    Cardiovascular:  Negative for chest pain and palpitations.   Gastrointestinal:  Positive for nausea. Negative for abdominal pain, diarrhea and vomiting.   Genitourinary:  Negative for dysuria and hematuria.    Musculoskeletal:  Positive for back pain. Negative for gait problem.   Skin:  Negative for color change and wound.   Neurological:  Positive for headaches. Negative for dizziness and weakness.   Psychiatric/Behavioral:  Positive for dysphoric mood. The patient is nervous/anxious.      Objective:     Vital Signs (Most Recent):  Temp: 98.4 °F (36.9 °C) (03/18/24 0759)  Pulse: 86 (03/18/24 0759)  Resp: 16 (03/18/24 0759)  BP: (!) 160/74 (03/18/24 0759)  SpO2: (!) 94 % (03/18/24 0759) Vital Signs (24h Range):  Temp:  [97.7 °F (36.5 °C)-98.7 °F (37.1 °C)] 98.4 °F (36.9 °C)  Pulse:  [] 86  Resp:  [16-18] 16  SpO2:  [94 %-97 %] 94 %  BP: (111-160)/(56-74) 160/74     Weight: 106.6 kg (235 lb 0.2 oz)  Body mass index is 40.34 kg/m².    Intake/Output Summary (Last 24 hours) at 3/18/2024 1023  Last data filed at 3/18/2024 0554  Gross per 24 hour   Intake 1150 ml   Output 1350 ml   Net -200 ml           Physical Exam  Constitutional:       General: She is not in acute distress.  HENT:      Head: Normocephalic and atraumatic.   Eyes:      Extraocular Movements: Extraocular movements intact.      Conjunctiva/sclera: Conjunctivae normal.      Pupils: Pupils are equal, round, and reactive to light.   Cardiovascular:      Rate and Rhythm: Normal rate and regular rhythm.      Pulses: Normal pulses.      Heart sounds: Normal heart sounds.      Comments: NSR on telemetry  Pulmonary:      Effort: Pulmonary effort is normal. No respiratory distress.      Breath sounds: Normal breath sounds.   Abdominal:      General: Bowel sounds are normal. There is no distension.      Palpations: Abdomen is soft.      Tenderness: There is no abdominal tenderness.   Musculoskeletal:         General: No swelling. Normal range of motion.      Cervical back: Normal range of motion and neck supple.   Skin:     General: Skin is warm and dry.      Capillary Refill: Capillary refill takes less than 2 seconds.   Neurological:      General: No focal  deficit present.      Mental Status: She is alert and oriented to person, place, and time.      Cranial Nerves: No cranial nerve deficit.   Psychiatric:         Attention and Perception: Attention normal.         Mood and Affect: Mood is depressed. Affect is tearful.             Significant Labs: All pertinent labs within the past 24 hours have been reviewed.  CBC:   Recent Labs   Lab 03/17/24  0843 03/17/24 2115 03/18/24  0503   WBC 14.56* 13.32* 13.39*   HGB 10.8* 11.7* 11.5*   HCT 33.2* 35.7* 35.9*   * 94* 101*       CMP:   Recent Labs   Lab 03/17/24  0844 03/17/24 2115 03/18/24  0503    140 138   K 4.3 4.3 3.8    110 109   CO2 20* 22* 21*    102 102   BUN 17 13 12   CREATININE 0.8 0.8 0.7   CALCIUM 7.8* 8.7 8.6*   PROT 5.4* 6.0 5.9*   ALBUMIN 2.8* 3.0* 2.9*   BILITOT 0.7 0.7 0.7   ALKPHOS 69 89 89   AST 15 20 16   ALT 15 18 15   ANIONGAP 9 8 8       Lab Results   Component Value Date    .2 (H) 03/18/2024         Microbiology Results (last 7 days)       Procedure Component Value Units Date/Time    Blood culture [[id number]] Collected: 03/18/24 0503    Order Status: Sent Specimen: Blood from Peripheral, Hand, Left Updated: 03/18/24 0845    Blood culture [[id number]] Collected: 03/18/24 0503    Order Status: Sent Specimen: Blood from Peripheral, Hand, Right Updated: 03/18/24 0845    Blood culture x two cultures. Draw prior to antibiotics. [[id number]]  (Abnormal) Collected: 03/16/24 0624    Order Status: Completed Specimen: Blood from Peripheral, Antecubital, Left Updated: 03/18/24 0628     Blood Culture, Routine Gram stain aer bottle: Gram negative rods      Gram stain alta bottle: Gram negative rods      Positive results previously called TO: Faviola Handley RN      03/16/2024  23:42 TGC      ESCHERICHIA COLI ESBL  For susceptibility see order #[id number]      Narrative:      Aerobic and anaerobic    Blood culture x two cultures. Draw prior to antibiotics. [[id number]]  (Abnormal)   (Susceptibility) Collected: 03/16/24 0617    Order Status: Completed Specimen: Blood from Peripheral, Antecubital, Right Updated: 03/18/24 0628     Blood Culture, Routine Gram stain aer bottle: Gram negative rods      Gram stain alta bottle: Gram negative rods      Results called to and read back by:Faviola Handley RN  03/16/2024  15:13      JBM      ESCHERICHIA COLI ESBL    Narrative:      Aerobic and anaerobic    Clostridium difficile EIA [[id number]]     Order Status: No result Specimen: Stool     Stool culture [[id number]]     Order Status: No result Specimen: Stool     Rapid Organism ID by PCR (from Blood culture) [[id number]]  (Abnormal) Collected: 03/16/24 0617    Order Status: Completed Updated: 03/16/24 1749     Enterococcus faecalis Not Detected     Enterococcus faecium Not Detected     Listeria monocytogenes Not Detected     Staphylococcus spp. Not Detected     Staphylococcus aureus Not Detected     Staphylococcus epidermidis Not Detected     Staphylococcus lugdunensis Not Detected     Streptococcus species Not Detected     Streptococcus agalactiae Not Detected     Streptococcus pneumoniae Not Detected     Streptococcus pyogenes Not Detected     Acinetobacter calcoaceticus/baumannii complex Not Detected     Bacteroides fragilis Not Detected     Enterobacterales See species for ID     Enterobacter cloacae complex Not Detected     Escherichia coli Detected     Klebsiella aerogenes Not Detected     Klebsiella oxytoca Not Detected     Klebsiella pneumoniae group Not Detected     Proteus Not Detected     Salmonella sp Not Detected     Serratia marcescens Not Detected     Haemophilus influenzae Not Detected     Neisseria meningtidis Not Detected     Pseudomonas aeruginosa Not Detected     Stenotrophomonas maltophilia Not Detected     Candida albicans Not Detected     Candida auris Not Detected     Candida glabrata Not Detected     Candida krusei Not Detected     Candida parapsilosis Not Detected     Candida  tropicalis Not Detected     Cryptococcus neoformans/gattii Not Detected     CTX-M (ESBL ) Detected     IMP (Carbapenem resistant) Not Detected     KPC resistance gene (Carbapenem resistant) Not Detected     mcr-1  Not Detected     mec A/C  Test not applicable     mec A/C and MREJ (MRSA) gene Test not applicable     NDM (Carbapenem resistant) Not Detected     OXA-48-like (Carbapenem resistant) Not Detected     van A/B (VRE gene) Test not applicable     VIM (Carbapenem resistant) Not Detected    Narrative:      Aerobic and anaerobic    Giardia Specific Antigen [[id number]]     Order Status: No result Specimen: Stool     Clostridium difficile EIA [[id number]]     Order Status: Canceled Specimen: Stool     Stool culture [[id number]]     Order Status: No result Specimen: Stool               Significant Imaging: I have reviewed all pertinent imaging results/findings within the past 24 hours.    Assessment/Plan:      * Severe sepsis  This patient does have evidence of infective focus  My overall impression is  severe sepsis .  Initially presented as early sepsis, now with leukocytosis, tachycardia, hypotension, and bacteremia.  Source: Abdominal and bacteremia  Antibiotics given-   Antibiotics (72h ago, onward)      Start     Stop Route Frequency Ordered    03/17/24 1245  meropenem (MERREM) 1 g in sodium chloride 0.9 % 100 mL IVPB (MB+)         -- IV Every 8 hours (non-standard times) 03/17/24 1134          Latest lactate reviewed-  Recent Labs   Lab 03/16/24  0617 03/16/24  0954 03/17/24  0844   LACTATE  --    < > 1.6   POCLAC 3.39*  --   --     < > = values in this interval not displayed.       Lab Results   Component Value Date    .2 (H) 03/18/2024       Organ dysfunction:  None    Fluid challenge:  Total of 3L IV bolus fluid given an ED    Post- resuscitation assessment :  Hypotension improved      Will Not start Pressors- Levophed for MAP of 65  Source control achieved by:  Treating with  antibiotic.  ------------------------------------------------------------------  Vital signs improving.  CRP remains significantly elevated.  ID following  Continue IV Merrem and IV fluid  Repeat blood cultures pending    Acute gastroenteritis  Essentially resolved.  Having residual intermittent nausea, unrelated to oral intake.   Continue IV fluid hydration, IV antiemetics PRN, and IV Protonix  Continue clear liquid diet advancing to regular as tolerated  Monitor    Cervical strain, acute  New onset.    Current medications ineffective  Trial of Valium prn  Patient currently does not want neck imaging  Consider x-ray if pain worsens/persist      Acute headache  Currently controlled.  Continue Imitrex PRN  Monitor      Bacteremia  Blood culture x2 sets (+) ESBL E coli susceptible to Merrem  Blood cultures are pending  Continue IV Merrem  ID following    Dehydration  Improved.  Due to nausea/vomiting and diarrhea. Evidenced by tachycardia and hypotension  Continue IV fluid hydration until oral intake improves  Continue IV antiemetics PRN      Hypotension  Much improved/ Due to sepsis   Continue IV fluid for perfusion  Continue telemetry monitoring         VTE Risk Mitigation (From admission, onward)           Ordered     enoxaparin injection 40 mg  Every 12 hours         03/17/24 0918     IP VTE HIGH RISK PATIENT  Once         03/17/24 0918     Place sequential compression device  Until discontinued         03/16/24 1244     Place SALEEM hose  Until discontinued         03/16/24 1244                    Discharge Planning   ROCIO: 3/20/2024     Code Status: Full Code   Is the patient medically ready for discharge?:     Reason for patient still in hospital (select all that apply): Patient unstable, Patient trending condition, and Treatment  Discharge Plan A: Home with family                  Belen Donald NP  Department of Hospital Medicine   [facility]/Surg

## 2024-03-18 NOTE — ASSESSMENT & PLAN NOTE
New onset.    Current medications ineffective  Trial of Valium prn  Patient currently does not want neck imaging  Consider x-ray if pain worsens/persist

## 2024-03-18 NOTE — CARE UPDATE
03/18/24 1445   Patient Assessment/Suction   Level of Consciousness (AVPU) alert   Respiratory Effort Unlabored   PRE-TX-O2   Device (Oxygen Therapy) room air   SpO2 96 %   Pulse Oximetry Type Intermittent   $ Pulse Oximetry - Multiple Charge Pulse Oximetry - Multiple

## 2024-03-18 NOTE — ASSESSMENT & PLAN NOTE
Essentially resolved.  Having residual intermittent nausea, unrelated to oral intake.   Continue IV fluid hydration, IV antiemetics PRN, and IV Protonix  Continue clear liquid diet advancing to regular as tolerated  Monitor

## 2024-03-18 NOTE — PLAN OF CARE
Attempted to reach Dr. Patel for consult. No answer via phone or secure chat. Night shift nurse made aware.

## 2024-03-18 NOTE — ASSESSMENT & PLAN NOTE
Improved.  Due to nausea/vomiting and diarrhea. Evidenced by tachycardia and hypotension  Continue IV fluid hydration until oral intake improves  Continue IV antiemetics PRN

## 2024-03-18 NOTE — HOSPITAL COURSE
Ms. Chatterjee was admitted for gastroenteritis and early sepsis.  Her labs and vital signs were monitored and she was maintained on telemetry.  She was treated with IV fluid hydration and IV antiemetics PRN.  She was initially placed on IV Rocephin and Flagyl for possible GI etiology while awaiting culture results.  She had no BMs while here and so a stool specimen was unable to be collected.  Her blood culture started to grow out GNR 1 set of 2 and she also went into severe sepsis with the development of leukocytosis and tachycardia.  In the meanwhile, her blood cultures started growing ESBL E coli.  ID was consulted for bacteremia and her antibiotic was changed to IV Merrem.  She had no further vomiting or diarrhea while here, however she continued to have intermittent nausea.  She was continued on a clear liquid diet advancing as tolerated to regular.  Infectious Disease was concerned that she has had recurrent UTIs.  A renal ultrasound was done which showed a possible renal stone, however, this was not evidenced on her abdominal CT which was done prior.  Urology recommends an outpatient evaluation for this.  Her 2nd set of blood cultures remain negative so far.  Id recommends Ertapenem 1 g IV daily for 10 days through March 27th.  Her overall condition has improved.  Her neck pain and GI symptoms are resolved.  She reports having a bowel movement this morning which was normal.  Her home infusion has been approved and set up and she is being discharged to home today in stable condition.  She we will follow up with Infectious Disease and her PCP.  A referral was placed to Urology.  She did not have any adverse events while here.

## 2024-03-18 NOTE — SUBJECTIVE & OBJECTIVE
Interval History:  Lying in bed, awake and alert.  Neck pain and stiffness worse today.  Current medications not helping.  Still with intermittent nausea.  No relation to oral intake.  Wants to try eating mashed potatoes.    Review of Systems   Constitutional:  Positive for activity change and appetite change. Negative for chills and fever.   Respiratory:  Negative for cough and shortness of breath.    Cardiovascular:  Negative for chest pain and palpitations.   Gastrointestinal:  Positive for nausea. Negative for abdominal pain, diarrhea and vomiting.   Genitourinary:  Negative for dysuria and hematuria.   Musculoskeletal:  Positive for back pain. Negative for gait problem.   Skin:  Negative for color change and wound.   Neurological:  Positive for headaches. Negative for dizziness and weakness.   Psychiatric/Behavioral:  Positive for dysphoric mood. The patient is nervous/anxious.      Objective:     Vital Signs (Most Recent):  Temp: 98.4 °F (36.9 °C) (03/18/24 0759)  Pulse: 86 (03/18/24 0759)  Resp: 16 (03/18/24 0759)  BP: (!) 160/74 (03/18/24 0759)  SpO2: (!) 94 % (03/18/24 0759) Vital Signs (24h Range):  Temp:  [97.7 °F (36.5 °C)-98.7 °F (37.1 °C)] 98.4 °F (36.9 °C)  Pulse:  [] 86  Resp:  [16-18] 16  SpO2:  [94 %-97 %] 94 %  BP: (111-160)/(56-74) 160/74     Weight: 106.6 kg (235 lb 0.2 oz)  Body mass index is 40.34 kg/m².    Intake/Output Summary (Last 24 hours) at 3/18/2024 1023  Last data filed at 3/18/2024 0554  Gross per 24 hour   Intake 1150 ml   Output 1350 ml   Net -200 ml           Physical Exam  Constitutional:       General: She is not in acute distress.  HENT:      Head: Normocephalic and atraumatic.   Eyes:      Extraocular Movements: Extraocular movements intact.      Conjunctiva/sclera: Conjunctivae normal.      Pupils: Pupils are equal, round, and reactive to light.   Cardiovascular:      Rate and Rhythm: Normal rate and regular rhythm.      Pulses: Normal pulses.      Heart sounds: Normal  heart sounds.      Comments: NSR on telemetry  Pulmonary:      Effort: Pulmonary effort is normal. No respiratory distress.      Breath sounds: Normal breath sounds.   Abdominal:      General: Bowel sounds are normal. There is no distension.      Palpations: Abdomen is soft.      Tenderness: There is no abdominal tenderness.   Musculoskeletal:         General: No swelling. Normal range of motion.      Cervical back: Normal range of motion and neck supple.   Skin:     General: Skin is warm and dry.      Capillary Refill: Capillary refill takes less than 2 seconds.   Neurological:      General: No focal deficit present.      Mental Status: She is alert and oriented to person, place, and time.      Cranial Nerves: No cranial nerve deficit.   Psychiatric:         Attention and Perception: Attention normal.         Mood and Affect: Mood is depressed. Affect is tearful.             Significant Labs: All pertinent labs within the past 24 hours have been reviewed.  CBC:   Recent Labs   Lab 03/17/24  0843 03/17/24 2115 03/18/24  0503   WBC 14.56* 13.32* 13.39*   HGB 10.8* 11.7* 11.5*   HCT 33.2* 35.7* 35.9*   * 94* 101*       CMP:   Recent Labs   Lab 03/17/24  0844 03/17/24 2115 03/18/24  0503    140 138   K 4.3 4.3 3.8    110 109   CO2 20* 22* 21*    102 102   BUN 17 13 12   CREATININE 0.8 0.8 0.7   CALCIUM 7.8* 8.7 8.6*   PROT 5.4* 6.0 5.9*   ALBUMIN 2.8* 3.0* 2.9*   BILITOT 0.7 0.7 0.7   ALKPHOS 69 89 89   AST 15 20 16   ALT 15 18 15   ANIONGAP 9 8 8       Lab Results   Component Value Date    .2 (H) 03/18/2024         Microbiology Results (last 7 days)       Procedure Component Value Units Date/Time    Blood culture [[id number]] Collected: 03/18/24 0503    Order Status: Sent Specimen: Blood from Peripheral, Hand, Left Updated: 03/18/24 0845    Blood culture [[id number]] Collected: 03/18/24 0503    Order Status: Sent Specimen: Blood from Peripheral, Hand, Right Updated: 03/18/24 0845     Blood culture x two cultures. Draw prior to antibiotics. [[id number]]  (Abnormal) Collected: 03/16/24 0624    Order Status: Completed Specimen: Blood from Peripheral, Antecubital, Left Updated: 03/18/24 0628     Blood Culture, Routine Gram stain aer bottle: Gram negative rods      Gram stain alta bottle: Gram negative rods      Positive results previously called TO: Faviola Handley RN      03/16/2024  23:42 TGC      ESCHERICHIA COLI ESBL  For susceptibility see order #[id number]      Narrative:      Aerobic and anaerobic    Blood culture x two cultures. Draw prior to antibiotics. [[id number]]  (Abnormal)  (Susceptibility) Collected: 03/16/24 0617    Order Status: Completed Specimen: Blood from Peripheral, Antecubital, Right Updated: 03/18/24 0628     Blood Culture, Routine Gram stain aer bottle: Gram negative rods      Gram stain alta bottle: Gram negative rods      Results called to and read back by:Fvaiola Handley RN  03/16/2024  15:13      JBM      ESCHERICHIA COLI ESBL    Narrative:      Aerobic and anaerobic    Clostridium difficile EIA [[id number]]     Order Status: No result Specimen: Stool     Stool culture [[id number]]     Order Status: No result Specimen: Stool     Rapid Organism ID by PCR (from Blood culture) [[id number]]  (Abnormal) Collected: 03/16/24 0617    Order Status: Completed Updated: 03/16/24 1749     Enterococcus faecalis Not Detected     Enterococcus faecium Not Detected     Listeria monocytogenes Not Detected     Staphylococcus spp. Not Detected     Staphylococcus aureus Not Detected     Staphylococcus epidermidis Not Detected     Staphylococcus lugdunensis Not Detected     Streptococcus species Not Detected     Streptococcus agalactiae Not Detected     Streptococcus pneumoniae Not Detected     Streptococcus pyogenes Not Detected     Acinetobacter calcoaceticus/baumannii complex Not Detected     Bacteroides fragilis Not Detected     Enterobacterales See species for ID     Enterobacter cloacae  complex Not Detected     Escherichia coli Detected     Klebsiella aerogenes Not Detected     Klebsiella oxytoca Not Detected     Klebsiella pneumoniae group Not Detected     Proteus Not Detected     Salmonella sp Not Detected     Serratia marcescens Not Detected     Haemophilus influenzae Not Detected     Neisseria meningtidis Not Detected     Pseudomonas aeruginosa Not Detected     Stenotrophomonas maltophilia Not Detected     Candida albicans Not Detected     Candida auris Not Detected     Candida glabrata Not Detected     Candida krusei Not Detected     Candida parapsilosis Not Detected     Candida tropicalis Not Detected     Cryptococcus neoformans/gattii Not Detected     CTX-M (ESBL ) Detected     IMP (Carbapenem resistant) Not Detected     KPC resistance gene (Carbapenem resistant) Not Detected     mcr-1  Not Detected     mec A/C  Test not applicable     mec A/C and MREJ (MRSA) gene Test not applicable     NDM (Carbapenem resistant) Not Detected     OXA-48-like (Carbapenem resistant) Not Detected     van A/B (VRE gene) Test not applicable     VIM (Carbapenem resistant) Not Detected    Narrative:      Aerobic and anaerobic    Giardia Specific Antigen [[id number]]     Order Status: No result Specimen: Stool     Clostridium difficile EIA [[id number]]     Order Status: Canceled Specimen: Stool     Stool culture [[id number]]     Order Status: No result Specimen: Stool               Significant Imaging: I have reviewed all pertinent imaging results/findings within the past 24 hours.

## 2024-03-18 NOTE — PROGRESS NOTES
[facility]  Department of Infectious Disease  Progress Note        PATIENT NAME: Yenifer Chatterjee  MRN: [id number]  TODAY'S DATE: 03/18/2024  ADMIT DATE: 3/16/2024    PRINCIPLE PROBLEM: Severe sepsis    INTERVAL HISTORY      3/18:  Patient discussed with Dr. Nobles, seen and examined at bedside, having kidney ultrasound performed.  She reports she has no longer feeling nauseous but she is still having cervical pain, seems musculoskeletal on her occipital area.  Hypertensive, afebrile.  Adequate urinary output, she has been constipated since Friday.  Labs reviewed, leukocytosis 13.3, left shift 82.9%, H&H 11 5/35.9, platelet count 101, thrombocytopenia, sed rate 32, CRP up to 284.2, high, procalcitonin down to 8.1.  Micro reviewed, blood cultures x2 sets from today no growth to date, pending final.  Kidney ultrasound revealed unknown shadows stone left kidney 3 mm.  Discussed with Hospital Medicine, plan for urology consult given recurrent UTIs, this is the patient's 3rd UTI in the last 4 months.    Antibiotics (From admission, onward)      Start     Stop Route Frequency Ordered    03/17/24 1245  meropenem (MERREM) 1 g in sodium chloride 0.9 % 100 mL IVPB (MB+)         -- IV Every 8 hours (non-standard times) 03/17/24 1134            Antifungals (From admission, onward)      None             Antivirals (From admission, onward)      None            ASSESSMENT and PLAN     Sepsis secondary to ESBL bacteremia in the setting of recurrent complicated UTI/3 mm left kidney   CRP 11-->243--> 284.2, high   Procalcitonin 16.7--> 8.1, trending down  Repeat blood cultures 3/18 x2 sets no growth to date, pending final  Kidney ultrasound with 3 mm nonshadowing kidney stone    Morbidly obese, BMI 40.3 kg/m2    Recommendations:  Urology consult for complicated UTI, this is her 3rd episode  in the last 4 months   Continue meropenem 1 g IV q.8 for ESBL E coli UTI   Awaiting 1 more day for negative blood cultures to  order midline   Anticipating discharge on ertapenem 1 g IV daily for 10-14 days, exact duration of therapy to be determined  Follow cultures   PT/OT as tolerated   Bowel regimen  Aspiration precautions     D/w patient at length, hospitalist/NP    Thank you for this consult. Please send [employer] secure chat with any questions.    SUBJECTIVE        Review of Systems  Review of systems obtained and negative except as stated above in Interval History      OBJECTIVE     Temp:  [97.7 °F (36.5 °C)-98.4 °F (36.9 °C)] 97.8 °F (36.6 °C)  Pulse:  [] 86  Resp:  [16-18] 17  SpO2:  [94 %-97 %] 96 %  BP: (117-160)/(64-75) 117/75    Physical Exam  General:  Obese female lying in bed, complaining of cervical pain, having kidney ultrasound done at bedside, breathing comfortable on room air  Eyes: Eyes with no icterus or injection. Vision grossly normal  Ears: Hearing grossly normal.  Nose: Nares patent  Mouth: Moist mucous membranes, dentition is fair. No ulcerations, erythema or exudates.  Neck: Supple, no tenderness to palpation.  Cardiovascular: Regular rate and rhythm, no murmurs, no edema.    Respiratory:  Clear to auscultation bilaterally, no tachypnea or increased work of breathing.  Gastrointestinal:  Soft and obese with active bowel sounds, no tenderness to palpation, no distention.  Genitourinary:  No suprapubic tenderness.  Musculoskeletal:  Pain to palpation on occipital area, seems musculoskeletal.  Moves all extremities with good strength.    Skin:  Warm and dry, no obvious rashes.    Neuro:   Oriented, conversant, follows commands.  Psych: Good mood, normal affect.     WOUNDS:   VAD:  PIV  ISOLATION:  Contact/ESBL    CBC LAST 7 DAYS  Recent Labs   Lab 03/16/24  0617 03/17/24  0843 03/17/24  2115 03/18/24  0503   WBC 6.24 14.56* 13.32* 13.39*   RBC 4.30 3.41* 3.70* 3.73*   HGB 13.5 10.8* 11.7* 11.5*   HCT 41.1 33.2* 35.7* 35.9*   MCV 96 97 97 96   MCH 31.4* 31.7* 31.6* 30.8   MCHC 32.8 32.5 32.8 32.0   RDW 12.6 13.2  "13.0 12.9    105* 94* 101*   MPV 11.0 11.1 11.3 12.3   GRAN 91.3*  5.7 90.2*  13.1* 86.1*  11.5* 82.9*  11.1*   LYMPH 7.2*  0.5* 4.0*  0.6* 6.4*  0.9* 6.9*  0.9*   MONO 0.5*  0.0* 4.7  0.7 5.0  0.7 5.8  0.8   BASO 0.01 0.04 0.02 0.03   NRBC 0 0 0 0       CHEMISTRY LAST 7 DAYS  Recent Labs   Lab 03/16/24  0617 03/17/24  0844 03/17/24  2115 03/18/24  0503    139 140 138   K 4.2 4.3 4.3 3.8    110 110 109   CO2 21* 20* 22* 21*   ANIONGAP 12 9 8 8   BUN 26* 17 13 12   CREATININE 0.8 0.8 0.8 0.7    103 102 102   CALCIUM 9.5 7.8* 8.7 8.6*   MG  --  1.7  --   --    ALBUMIN 4.0 2.8* 3.0* 2.9*   PROT 7.1 5.4* 6.0 5.9*   ALKPHOS 105 69 89 89   ALT 16 15 18 15   AST 13 15 20 16   BILITOT 0.7 0.7 0.7 0.7       Estimated Creatinine Clearance: 101.9 mL/min (based on SCr of 0.7 mg/dL).    INFLAMMATORY/PROCAL  LAST 7 DAYS  Recent Labs   Lab 03/16/24  0954 03/17/24  0844 03/18/24  0503   PROCAL  --  16.78* 8.10*   CRP 11.0* 243.4* 284.2*     No results found for: "ESR"  CRP   Date Value Ref Range Status   03/18/2024 284.2 (H) 0.0 - 8.2 mg/L Final   03/17/2024 243.4 (H) 0.0 - 8.2 mg/L Final   03/16/2024 11.0 (H) 0.0 - 8.2 mg/L Final       PRIOR  MICROBIOLOGY:      LAST 7 DAYS MICROBIOLOGY   Microbiology Results (last 7 days)       Procedure Component Value Units Date/Time    Blood culture [[id number]] Collected: 03/18/24 0503    Order Status: Completed Specimen: Blood from Peripheral, Hand, Right Updated: 03/18/24 1558     Blood Culture, Routine No Growth to date    Blood culture [[id number]] Collected: 03/18/24 0503    Order Status: Completed Specimen: Blood from Peripheral, Hand, Left Updated: 03/18/24 1558     Blood Culture, Routine No Growth to date    Blood culture x two cultures. Draw prior to antibiotics. [[id number]]  (Abnormal) Collected: 03/16/24 0624    Order Status: Completed Specimen: Blood from Peripheral, Antecubital, Left Updated: 03/18/24 0628     Blood Culture, Routine Gram stain " aer bottle: Gram negative rods      Gram stain alta bottle: Gram negative rods      Positive results previously called TO: Faviola Handley RN      03/16/2024  23:42 TGC      ESCHERICHIA COLI ESBL  For susceptibility see order #[id number]      Narrative:      Aerobic and anaerobic    Blood culture x two cultures. Draw prior to antibiotics. [[id number]]  (Abnormal)  (Susceptibility) Collected: 03/16/24 0617    Order Status: Completed Specimen: Blood from Peripheral, Antecubital, Right Updated: 03/18/24 0628     Blood Culture, Routine Gram stain aer bottle: Gram negative rods      Gram stain alta bottle: Gram negative rods      Results called to and read back by:Faviola Handley RN  03/16/2024  15:13      JBM      ESCHERICHIA COLI ESBL    Narrative:      Aerobic and anaerobic    Clostridium difficile EIA [[id number]]     Order Status: No result Specimen: Stool     Stool culture [[id number]]     Order Status: No result Specimen: Stool     Rapid Organism ID by PCR (from Blood culture) [[id number]]  (Abnormal) Collected: 03/16/24 0617    Order Status: Completed Updated: 03/16/24 1749     Enterococcus faecalis Not Detected     Enterococcus faecium Not Detected     Listeria monocytogenes Not Detected     Staphylococcus spp. Not Detected     Staphylococcus aureus Not Detected     Staphylococcus epidermidis Not Detected     Staphylococcus lugdunensis Not Detected     Streptococcus species Not Detected     Streptococcus agalactiae Not Detected     Streptococcus pneumoniae Not Detected     Streptococcus pyogenes Not Detected     Acinetobacter calcoaceticus/baumannii complex Not Detected     Bacteroides fragilis Not Detected     Enterobacterales See species for ID     Enterobacter cloacae complex Not Detected     Escherichia coli Detected     Klebsiella aerogenes Not Detected     Klebsiella oxytoca Not Detected     Klebsiella pneumoniae group Not Detected     Proteus Not Detected     Salmonella sp Not Detected     Serratia  marcescens Not Detected     Haemophilus influenzae Not Detected     Neisseria meningtidis Not Detected     Pseudomonas aeruginosa Not Detected     Stenotrophomonas maltophilia Not Detected     Candida albicans Not Detected     Candida auris Not Detected     Candida glabrata Not Detected     Candida krusei Not Detected     Candida parapsilosis Not Detected     Candida tropicalis Not Detected     Cryptococcus neoformans/gattii Not Detected     CTX-M (ESBL ) Detected     IMP (Carbapenem resistant) Not Detected     KPC resistance gene (Carbapenem resistant) Not Detected     mcr-1  Not Detected     mec A/C  Test not applicable     mec A/C and MREJ (MRSA) gene Test not applicable     NDM (Carbapenem resistant) Not Detected     OXA-48-like (Carbapenem resistant) Not Detected     van A/B (VRE gene) Test not applicable     VIM (Carbapenem resistant) Not Detected    Narrative:      Aerobic and anaerobic    Giardia Specific Antigen [[id number]]     Order Status: No result Specimen: Stool     Clostridium difficile EIA [[id number]]     Order Status: Canceled Specimen: Stool     Stool culture [[id number]]     Order Status: No result Specimen: Stool             SUSCEPTIBILITY  Susceptibility data from last 90 days.  Collected Specimen Info Organism Amp/Sulbactam Ampicillin Cefepime Ceftriaxone Ciprofloxacin Ertapenem Gentamicin Levofloxacin Meropenem PIPERACILLIN/TAZO SUSCEPTIBILITY Tetracycline Tobramycin Trimeth/Sulfa   03/16/24 Blood from Peripheral, Antecubital, Left Escherichia coli ESBL                03/16/24 Blood from Peripheral, Antecubital, Right Escherichia coli ESBL  S  R  R  R  R  S  R  R  S  S  R  I  R       CURRENT/PREVIOUS VISIT EKG  Results for orders placed or performed during the hospital encounter of 09/10/22   EKG 12-lead    Collection Time: 09/10/22 10:52 PM    Narrative    Test Reason :     Vent. Rate : 090 BPM     Atrial Rate : 090 BPM     P-R Int : 124 ms          QRS Dur : 076 ms      QT Int :  364 ms       P-R-T Axes : 036 031 044 degrees     QTc Int : 445 ms    Normal sinus rhythm  Low anterior forces-cannot exclude Anterior infarct ,age undetermined but  doubt  Abnormal ECG  When compared with ECG of 04-SEP-2022 13:01,  Sinus rhythm has replaced Junctional rhythm  Anterior forces are slightly less  Confirmed by Michele OCHOA MD ([id number]) on 9/11/2022 9:29:51 AM    Referred By: [email or web address]   SELF           Confirmed By:Michele OCHOA MD       Significant Labs: All pertinent labs within the past 24 hours have been reviewed.     Significant Imaging: I have reviewed all relevant and available imaging results/findings within the past 24 hours.    CXR    CT abdomen/pelvis w/ IV contrast no PO contrast:  FINDINGS:  Lung bases are clear.  Normal sized heart.  Unremarkable gallbladder.  There are a few simple cysts along the left kidney cortex and pelvis.  Fatty atrophy along the proximal pancreas.  Remaining solid abdominal organs are unremarkable.  There is no enteric contrast which limits bowel assessment.  No dilated bowel loops.  There are a few scattered colonic diverticula.  Normal appendix.  Small fat containing umbilical defect.  Atrophied uterus and decompressed urinary bladder.  Mild multilevel spinal degenerative change.  Severe left hip degenerative change.    Impression:    No acute findings in the abdomen.    Kidney US:  Right kidney: The right kidney measures 10.3 cm. No cortical thinning. No loss of corticomedullary distinction. Resistive index measures 0.67.  No mass. No renal stone. No hydronephrosis.  Left kidney: The left kidney measures 11.9 cm. No cortical thinning. No loss of corticomedullary distinction. Resistive index measures 0.78.  There are at least 3 anechoic lesions between the mid and lower poles.  Largest at midpole is 2.2 cm.  There is a measured echogenic foci near the upper pole at 3 mm, with some twinkle artifact.  No hydronephrosis.  The bladder is partially distended at the time of  scanning and has an unremarkable appearance.    Impression:    Query a nonshadowing stone in the left kidney, 3 mm.  Several simple left renal cysts.        Mildred Hill MD  Date of Service: 03/18/2024  3:59 PM

## 2024-03-18 NOTE — ASSESSMENT & PLAN NOTE
This patient does have evidence of infective focus  My overall impression is  severe sepsis .  Initially presented as early sepsis, now with leukocytosis, tachycardia, hypotension, and bacteremia.  Source: Abdominal and bacteremia  Antibiotics given-   Antibiotics (72h ago, onward)      Start     Stop Route Frequency Ordered    03/17/24 1245  meropenem (MERREM) 1 g in sodium chloride 0.9 % 100 mL IVPB (MB+)         -- IV Every 8 hours (non-standard times) 03/17/24 1134          Latest lactate reviewed-  Recent Labs   Lab 03/16/24  0617 03/16/24  0954 03/17/24  0844   LACTATE  --    < > 1.6   POCLAC 3.39*  --   --     < > = values in this interval not displayed.       Lab Results   Component Value Date    .2 (H) 03/18/2024       Organ dysfunction:  None    Fluid challenge:  Total of 3L IV bolus fluid given an ED    Post- resuscitation assessment :  Hypotension improved      Will Not start Pressors- Levophed for MAP of 65  Source control achieved by:  Treating with antibiotic.  ------------------------------------------------------------------  Vital signs improving.  CRP remains significantly elevated.  ID following  Continue IV Merrem and IV fluid  Repeat blood cultures pending

## 2024-03-18 NOTE — PLAN OF CARE
Problem: Adult Inpatient Plan of Care  Goal: Plan of Care Review  Outcome: Ongoing, Progressing  Goal: Patient-Specific Goal (Individualized)  Outcome: Ongoing, Progressing  Goal: Optimal Comfort and Wellbeing  Outcome: Ongoing, Progressing     Problem: Adjustment to Illness (Sepsis/Septic Shock)  Goal: Optimal Coping  Outcome: Ongoing, Progressing     Problem: Nutrition Impaired (Sepsis/Septic Shock)  Goal: Optimal Nutrition Intake  Outcome: Ongoing, Progressing

## 2024-03-18 NOTE — PLAN OF CARE
Problem: Adult Inpatient Plan of Care  Goal: Plan of Care Review  Outcome: Ongoing, Progressing     Problem: Adult Inpatient Plan of Care  Goal: Absence of Hospital-Acquired Illness or Injury  Outcome: Ongoing, Progressing     Problem: Adjustment to Illness (Sepsis/Septic Shock)  Goal: Optimal Coping  Outcome: Ongoing, Progressing     Problem: Bleeding (Sepsis/Septic Shock)  Goal: Absence of Bleeding  Outcome: Ongoing, Progressing     Problem: Glycemic Control Impaired (Sepsis/Septic Shock)  Goal: Blood Glucose Level Within Desired Range  Outcome: Ongoing, Progressing     Problem: Infection Progression (Sepsis/Septic Shock)  Goal: Absence of Infection Signs and Symptoms  Outcome: Ongoing, Progressing     Problem: Nutrition Impaired (Sepsis/Septic Shock)  Goal: Optimal Nutrition Intake  Outcome: Ongoing, Progressing     Problem: Infection  Goal: Absence of Infection Signs and Symptoms  Outcome: Ongoing, Progressing     Problem: Bariatric Environmental Safety  Goal: Safety Maintained with Care  Outcome: Ongoing, Progressing

## 2024-03-18 NOTE — NURSING
Pt refused cardiac monitoring. Educated patient on importance of cardiac monitoring. Verbalizes understanding, refusal form signed and placed on chart.

## 2024-03-18 NOTE — ASSESSMENT & PLAN NOTE
Blood culture x2 sets (+) ESBL E coli susceptible to Merrem  Blood cultures are pending  Continue IV Merrem  ID following

## 2024-03-18 NOTE — PLAN OF CARE
POC reviewed VSS afebrile this shift pain and headache Managed with PRN medication at this time, no stool this shift CDiff Precautions stopped Tolerating clear liquid diet without complications ABX changed to meropenum ID covering and rounded on patient this shift Robaxin given for muscle spasms to neck    .

## 2024-03-19 VITALS
OXYGEN SATURATION: 97 % | BODY MASS INDEX: 40.12 KG/M2 | RESPIRATION RATE: 16 BRPM | TEMPERATURE: 98 F | DIASTOLIC BLOOD PRESSURE: 64 MMHG | WEIGHT: 235 LBS | SYSTOLIC BLOOD PRESSURE: 134 MMHG | HEART RATE: 72 BPM | HEIGHT: 64 IN

## 2024-03-19 PROBLEM — K52.9 ACUTE GASTROENTERITIS: Status: RESOLVED | Noted: 2024-03-16 | Resolved: 2024-03-19

## 2024-03-19 PROBLEM — R51.9 ACUTE HEADACHE: Status: RESOLVED | Noted: 2024-03-17 | Resolved: 2024-03-19

## 2024-03-19 PROBLEM — S16.1XXA CERVICAL STRAIN, ACUTE: Status: RESOLVED | Noted: 2024-03-18 | Resolved: 2024-03-19

## 2024-03-19 PROBLEM — A41.9 SEVERE SEPSIS: Status: RESOLVED | Noted: 2024-03-16 | Resolved: 2024-03-19

## 2024-03-19 PROBLEM — R65.20 SEVERE SEPSIS: Status: RESOLVED | Noted: 2024-03-16 | Resolved: 2024-03-19

## 2024-03-19 PROBLEM — I95.9 HYPOTENSION: Status: RESOLVED | Noted: 2024-03-16 | Resolved: 2024-03-19

## 2024-03-19 PROBLEM — E86.0 DEHYDRATION: Status: RESOLVED | Noted: 2024-03-16 | Resolved: 2024-03-19

## 2024-03-19 LAB
ALBUMIN SERPL BCP-MCNC: 2.8 G/DL (ref 3.5–5.2)
ALP SERPL-CCNC: 77 U/L (ref 55–135)
ALT SERPL W/O P-5'-P-CCNC: 12 U/L (ref 10–44)
ANION GAP SERPL CALC-SCNC: 9 MMOL/L (ref 8–16)
AST SERPL-CCNC: 14 U/L (ref 10–40)
BASOPHILS # BLD AUTO: 0.04 K/UL (ref 0–0.2)
BASOPHILS NFR BLD: 0.4 % (ref 0–1.9)
BILIRUB SERPL-MCNC: 0.6 MG/DL (ref 0.1–1)
BUN SERPL-MCNC: 15 MG/DL (ref 6–20)
CALCIUM SERPL-MCNC: 8.6 MG/DL (ref 8.7–10.5)
CHLORIDE SERPL-SCNC: 110 MMOL/L (ref 95–110)
CO2 SERPL-SCNC: 22 MMOL/L (ref 23–29)
CREAT SERPL-MCNC: 0.6 MG/DL (ref 0.5–1.4)
DIFFERENTIAL METHOD BLD: ABNORMAL
EOSINOPHIL # BLD AUTO: 0.3 K/UL (ref 0–0.5)
EOSINOPHIL NFR BLD: 3.6 % (ref 0–8)
ERYTHROCYTE [DISTWIDTH] IN BLOOD BY AUTOMATED COUNT: 12.5 % (ref 11.5–14.5)
EST. GFR  (NO RACE VARIABLE): >60 ML/MIN/1.73 M^2
GLUCOSE SERPL-MCNC: 95 MG/DL (ref 70–110)
HCT VFR BLD AUTO: 35.4 % (ref 37–48.5)
HGB BLD-MCNC: 11.8 G/DL (ref 12–16)
IMM GRANULOCYTES # BLD AUTO: 0.07 K/UL (ref 0–0.04)
IMM GRANULOCYTES NFR BLD AUTO: 0.8 % (ref 0–0.5)
LYMPHOCYTES # BLD AUTO: 1.3 K/UL (ref 1–4.8)
LYMPHOCYTES NFR BLD: 13.7 % (ref 18–48)
MCH RBC QN AUTO: 31.6 PG (ref 27–31)
MCHC RBC AUTO-ENTMCNC: 33.3 G/DL (ref 32–36)
MCV RBC AUTO: 95 FL (ref 82–98)
MONOCYTES # BLD AUTO: 0.7 K/UL (ref 0.3–1)
MONOCYTES NFR BLD: 7.7 % (ref 4–15)
NEUTROPHILS # BLD AUTO: 6.8 K/UL (ref 1.8–7.7)
NEUTROPHILS NFR BLD: 73.8 % (ref 38–73)
NRBC BLD-RTO: 0 /100 WBC
PLATELET # BLD AUTO: 101 K/UL (ref 150–450)
PMV BLD AUTO: 11.7 FL (ref 9.2–12.9)
POTASSIUM SERPL-SCNC: 4.1 MMOL/L (ref 3.5–5.1)
PROT SERPL-MCNC: 5.9 G/DL (ref 6–8.4)
RBC # BLD AUTO: 3.73 M/UL (ref 4–5.4)
SODIUM SERPL-SCNC: 141 MMOL/L (ref 136–145)
WBC # BLD AUTO: 9.24 K/UL (ref 3.9–12.7)

## 2024-03-19 PROCEDURE — 99233 SBSQ HOSP IP/OBS HIGH 50: CPT | Mod: ,,, | Performed by: STUDENT IN AN ORGANIZED HEALTH CARE EDUCATION/TRAINING PROGRAM

## 2024-03-19 PROCEDURE — 63600175 PHARM REV CODE 636 W HCPCS: Performed by: INTERNAL MEDICINE

## 2024-03-19 PROCEDURE — 94761 N-INVAS EAR/PLS OXIMETRY MLT: CPT

## 2024-03-19 PROCEDURE — C1751 CATH, INF, PER/CENT/MIDLINE: HCPCS

## 2024-03-19 PROCEDURE — 36415 COLL VENOUS BLD VENIPUNCTURE: CPT | Performed by: NURSE PRACTITIONER

## 2024-03-19 PROCEDURE — 80053 COMPREHEN METABOLIC PANEL: CPT | Performed by: NURSE PRACTITIONER

## 2024-03-19 PROCEDURE — 85025 COMPLETE CBC W/AUTO DIFF WBC: CPT | Performed by: NURSE PRACTITIONER

## 2024-03-19 PROCEDURE — 25000003 PHARM REV CODE 250: Performed by: NURSE PRACTITIONER

## 2024-03-19 PROCEDURE — C9113 INJ PANTOPRAZOLE SODIUM, VIA: HCPCS | Performed by: NURSE PRACTITIONER

## 2024-03-19 PROCEDURE — 97161 PT EVAL LOW COMPLEX 20 MIN: CPT

## 2024-03-19 PROCEDURE — 76937 US GUIDE VASCULAR ACCESS: CPT

## 2024-03-19 PROCEDURE — 25000003 PHARM REV CODE 250: Performed by: INTERNAL MEDICINE

## 2024-03-19 PROCEDURE — 63600175 PHARM REV CODE 636 W HCPCS: Performed by: NURSE PRACTITIONER

## 2024-03-19 PROCEDURE — 36410 VNPNXR 3YR/> PHY/QHP DX/THER: CPT

## 2024-03-19 RX ORDER — PANTOPRAZOLE SODIUM 40 MG/1
40 TABLET, DELAYED RELEASE ORAL DAILY
Status: DISCONTINUED | OUTPATIENT
Start: 2024-03-20 | End: 2024-03-19 | Stop reason: HOSPADM

## 2024-03-19 RX ORDER — ONDANSETRON 4 MG/1
4 TABLET, ORALLY DISINTEGRATING ORAL EVERY 6 HOURS PRN
Qty: 15 TABLET | Refills: 0 | Status: SHIPPED | OUTPATIENT
Start: 2024-03-19

## 2024-03-19 RX ADMIN — MEROPENEM 1 G: 1 INJECTION, POWDER, FOR SOLUTION INTRAVENOUS at 04:03

## 2024-03-19 RX ADMIN — MEROPENEM 1 G: 1 INJECTION, POWDER, FOR SOLUTION INTRAVENOUS at 12:03

## 2024-03-19 RX ADMIN — ENOXAPARIN SODIUM 40 MG: 40 INJECTION SUBCUTANEOUS at 09:03

## 2024-03-19 RX ADMIN — ACETAMINOPHEN 650 MG: 325 TABLET ORAL at 03:03

## 2024-03-19 RX ADMIN — ACETAMINOPHEN 650 MG: 325 TABLET ORAL at 12:03

## 2024-03-19 RX ADMIN — PANTOPRAZOLE SODIUM 40 MG: 40 INJECTION, POWDER, LYOPHILIZED, FOR SOLUTION INTRAVENOUS at 09:03

## 2024-03-19 NOTE — PT/OT/SLP EVAL
Physical Therapy Evaluation and Discharge Note    Patient Name:  Yenifer Chatterjee   MRN:  [id number]    Recommendations:     Discharge Recommendations: No Therapy Indicated  Discharge Equipment Recommendations: none   Barriers to discharge: None    Assessment:     Yenifer Chatterjee is a 58 y.o. female admitted with a medical diagnosis of Severe sepsis. . Pt seen at bedside, alert/motivated and eager to discharge. Pt on contact isolation. Pt demonstrated  indep mobility in bed and in room. Pt ambulated within room indep.    At this time, patient is functioning at their prior level of function and does not require further acute PT services.     Recent Surgery: * No surgery found *      Plan:     During this hospitalization, patient does not require further acute PT services.  Please re-consult if situation changes.      Subjective   Pt stated that she is normally indep/employed at [employer] and always moving  Chief Complaint: arthritis LH and knee and requiring MRI and maybe sx  Patient/Family Comments/goals: get home  Pain/Comfort:  Pain Rating 1: 0/10    Patients cultural, spiritual, Jainism conflicts given the current situation:      Living Environment:  Home with SO  Prior to admission, patients level of function was indep.  Equipment used at home: none.  DME owned (not currently used): none.  Upon discharge, patient will have assistance from family.    Objective:     Communicated with nurse Starkey prior to session.  Patient found HOB elevated with   upon PT entry to room.    General Precautions: Standard,      Orthopedic Precautions:N/A   Braces: N/A  Respiratory Status: Room air    Exams:  Postural Exam:  Patient presented with the following abnormalities:    -       BMI 40.34  RLE ROM: WFL  RLE Strength: WFL  LLE ROM: WFL  LLE Strength: WFL    Functional Mobility:  Bed Mobility:     Scooting: independence  Supine to Sit: independence  Sit to Supine: independence  Transfers:     Sit to Stand:  independence with  no AD  Gait: within room indep    AM-PAC 6 CLICK MOBILITY  Total Score:24       Treatment and Education:  Patient was educated on the importance of OOB activity and functional mobility to negate negative effects of prolonged bed rest during hospitalization, safe transfers and ambulation, and D/C planning    Pt mobilizing well within room- on contact isolation    AM-PAC 6 CLICK MOBILITY  Total Score:24     Patient left HOB elevated with all lines intact and call button in reach.    GOALS:   Multidisciplinary Problems       Physical Therapy Goals       Not on file                    History:     No past medical history on file.    Past Surgical History:   Procedure Laterality Date    CYSTOSCOPY N/A 9/23/2022    Procedure: CYSTOSCOPY;  Surgeon: Woo Ramirez MD;  Location: [facility] OR [address] [place] [address];  Service: Urology;  Laterality: N/A;    CYSTOSCOPY W/ URETERAL STENT PLACEMENT Right 9/4/2022    Procedure: CYSTOSCOPY, WITH URETERAL STENT INSERTION;  Surgeon: Ree Pak MD;  Location: [address];  Service: Urology;  Laterality: Right;    LASER LITHOTRIPSY Right 9/23/2022    Procedure: LITHOTRIPSY, USING LASER;  Surgeon: Woo Ramirez MD;  Location: [address];  Service: Urology;  Laterality: Right;    PYELOSCOPY Right 9/23/2022    Procedure: PYELOSCOPY;  Surgeon: Woo Ramirez MD;  Location: [address];  Service: Urology;  Laterality: Right;    RETROGRADE PYELOGRAPHY Right 9/4/2022    Procedure: PYELOGRAM, RETROGRADE;  Surgeon: Ree Pak MD;  Location: [address];  Service: Urology;  Laterality: Right;    RETROGRADE PYELOGRAPHY Right 9/23/2022    Procedure: PYELOGRAM, RETROGRADE;  Surgeon: Woo Ramirez MD;  Location: [address];  Service: Urology;  Laterality: Right;    URETEROSCOPIC REMOVAL OF URETERIC CALCULUS Right 9/23/2022    Procedure: REMOVAL, CALCULUS, URETER, URETEROSCOPIC;  Surgeon: Woo Ramirez MD;  Location: [address];  Service: Urology;  Laterality: Right;     URETEROSCOPY Right 9/23/2022    Procedure: URETEROSCOPY;  Surgeon: Woo Ramirez MD;  Location: [facility] OR [address] [place] [address];  Service: Urology;  Laterality: Right;       Time Tracking:     PT Received On: 03/19/24  PT Start Time: 1045     PT Stop Time: 1053  PT Total Time (min): 8 min     Billable Minutes: Evaluation 8      03/19/2024

## 2024-03-19 NOTE — PROGRESS NOTES
[facility] Medicine  Progress Note    Patient Name: Yenifer Chatterjee  MRN: [id number]  Patient Class: IP- Inpatient   Admission Date: 3/16/2024  Length of Stay: 3 days  Attending Physician: Kb Tee MD  Primary Care Provider: iBng, Primary Doctor        Subjective:     Principal Problem:Severe sepsis        HPI:  Ms. Chatterjee is a 58-year-old female with a past medical history of nephrolithiasis.  She was in her usual state of health when she started having abdominal pain, nausea/vomiting, and diarrhea.  The pain got progressively worse and diffuse to her back/trunk in uterus area.  She was concerned about having recurrent infection and kidney stone so she came to the hospital.  While in the ED, she had a CT of the abdomen/pelvis with IV contrast and it was negative.  She met sepsis criteria and was given IV Rocephin for possible UTI.  Her lactic acid came back elevated and she also was mildly hypotensive and bolused with IV NS.  She is being admitted to the hospital for further treatment and management.  She currently reports her nausea and abdominal pain are much better.  Still having right lower quadrant tenderness.  She report it is uterus pain. No diarrhea since being in the hospital.  Complains of having headache currently.    Overview/Hospital Course:  Ms. Chatterjee was admitted for gastroenteritis and early sepsis.  Her labs and vital signs were monitored and she was maintained on telemetry.  She was treated with IV fluid hydration and IV antiemetics PRN.  She was initially placed on IV Rocephin and Flagyl while awaiting culture results.  She had no BMs while here and so a stool specimen was unable to be collected.  Her blood culture started to grow out GNR 1 set of 2 and she also went into severe sepsis with the development of leukocytosis and tachycardia.  In the meanwhile, her blood cultures started growing ESBL E coli.  ID was consulted for bacteremia and her antibiotic was  changed to IV Merrem.  She had no further vomiting or diarrhea while here, however she continued to have intermittent nausea.  She was continued on a clear liquid diet advancing as tolerated to regular.      Interval History:  Lying in bed, awake and alert.  Reports feeling good today. had BM this morning and it was normal.  Doing better with diet.  No nausea.  No longer having neck pain.  It resolved on its own during the night.  Urology consulted per ID recommendations to evaluate for recurrent UTIs and possible renal stone.  Negative so far.  No acute issues at this time.    Review of Systems   Constitutional:  Positive for appetite change. Negative for activity change, chills and fever.   Respiratory:  Negative for cough and shortness of breath.    Cardiovascular:  Negative for chest pain and palpitations.   Gastrointestinal:  Negative for abdominal pain, diarrhea, nausea and vomiting.   Genitourinary:  Negative for dysuria and hematuria.   Musculoskeletal:  Negative for back pain and gait problem.   Skin:  Negative for color change and wound.   Neurological:  Negative for dizziness, weakness and headaches.   Psychiatric/Behavioral:  Negative for dysphoric mood. The patient is not nervous/anxious.      Objective:     Vital Signs (Most Recent):  Temp: 97.9 °F (36.6 °C) (03/19/24 1126)  Pulse: 72 (03/19/24 1126)  Resp: 16 (03/19/24 1126)  BP: 134/64 (03/19/24 1126)  SpO2: 97 % (03/19/24 1126) Vital Signs (24h Range):  Temp:  [95 °F (35 °C)-98 °F (36.7 °C)] 97.9 °F (36.6 °C)  Pulse:  [69-86] 72  Resp:  [15-18] 16  SpO2:  [96 %-98 %] 97 %  BP: (105-134)/(59-75) 134/64     Weight: 106.6 kg (235 lb 0.2 oz)  Body mass index is 40.34 kg/m².    Intake/Output Summary (Last 24 hours) at 3/19/2024 1321  Last data filed at 3/19/2024 0618  Gross per 24 hour   Intake 770 ml   Output 700 ml   Net 70 ml           Physical Exam  Constitutional:       General: She is not in acute distress.  HENT:      Head: Normocephalic and  atraumatic.   Eyes:      Extraocular Movements: Extraocular movements intact.      Conjunctiva/sclera: Conjunctivae normal.      Pupils: Pupils are equal, round, and reactive to light.   Cardiovascular:      Rate and Rhythm: Normal rate and regular rhythm.      Pulses: Normal pulses.      Heart sounds: Normal heart sounds.      Comments: NSR on telemetry  Pulmonary:      Effort: Pulmonary effort is normal. No respiratory distress.      Breath sounds: Normal breath sounds.   Abdominal:      General: Bowel sounds are normal. There is no distension.      Palpations: Abdomen is soft.      Tenderness: There is no abdominal tenderness.   Musculoskeletal:         General: No swelling. Normal range of motion.      Cervical back: Normal range of motion and neck supple.   Skin:     General: Skin is warm and dry.      Capillary Refill: Capillary refill takes less than 2 seconds.   Neurological:      General: No focal deficit present.      Mental Status: She is alert and oriented to person, place, and time.      Cranial Nerves: No cranial nerve deficit.   Psychiatric:         Attention and Perception: Attention normal.         Mood and Affect: Mood and affect normal.             Significant Labs: All pertinent labs within the past 24 hours have been reviewed.  CBC:   Recent Labs   Lab 03/17/24 2115 03/18/24  0503 03/19/24  0430   WBC 13.32* 13.39* 9.24   HGB 11.7* 11.5* 11.8*   HCT 35.7* 35.9* 35.4*   PLT 94* 101* 101*       CMP:   Recent Labs   Lab 03/17/24 2115 03/18/24  0503 03/19/24  0430    138 141   K 4.3 3.8 4.1    109 110   CO2 22* 21* 22*    102 95   BUN 13 12 15   CREATININE 0.8 0.7 0.6   CALCIUM 8.7 8.6* 8.6*   PROT 6.0 5.9* 5.9*   ALBUMIN 3.0* 2.9* 2.8*   BILITOT 0.7 0.7 0.6   ALKPHOS 89 89 77   AST 20 16 14   ALT 18 15 12   ANIONGAP 8 8 9       Lab Results   Component Value Date    .2 (H) 03/18/2024         Microbiology Results (last 7 days)       Procedure Component Value Units  Date/Time    Blood culture [[id number]] Collected: 03/18/24 0503    Order Status: Completed Specimen: Blood from Peripheral, Hand, Left Updated: 03/19/24 1032     Blood Culture, Routine No Growth to date      No Growth to date    Blood culture [[id number]] Collected: 03/18/24 0503    Order Status: Completed Specimen: Blood from Peripheral, Hand, Right Updated: 03/19/24 1032     Blood Culture, Routine No Growth to date      No Growth to date    Blood culture x two cultures. Draw prior to antibiotics. [[id number]]  (Abnormal) Collected: 03/16/24 0624    Order Status: Completed Specimen: Blood from Peripheral, Antecubital, Left Updated: 03/18/24 0628     Blood Culture, Routine Gram stain aer bottle: Gram negative rods      Gram stain alta bottle: Gram negative rods      Positive results previously called TO: Faviola Handley RN      03/16/2024  23:42 TGC      ESCHERICHIA COLI ESBL  For susceptibility see order #[id number]      Narrative:      Aerobic and anaerobic    Blood culture x two cultures. Draw prior to antibiotics. [[id number]]  (Abnormal)  (Susceptibility) Collected: 03/16/24 0617    Order Status: Completed Specimen: Blood from Peripheral, Antecubital, Right Updated: 03/18/24 0628     Blood Culture, Routine Gram stain aer bottle: Gram negative rods      Gram stain alta bottle: Gram negative rods      Results called to and read back by:Faviola Handley RN  03/16/2024  15:13      JBM      ESCHERICHIA COLI ESBL    Narrative:      Aerobic and anaerobic    Clostridium difficile EIA [[id number]]     Order Status: Canceled Specimen: Stool     Stool culture [[id number]]     Order Status: No result Specimen: Stool     Rapid Organism ID by PCR (from Blood culture) [[id number]]  (Abnormal) Collected: 03/16/24 0617    Order Status: Completed Updated: 03/16/24 1749     Enterococcus faecalis Not Detected     Enterococcus faecium Not Detected     Listeria monocytogenes Not Detected     Staphylococcus spp. Not Detected      Staphylococcus aureus Not Detected     Staphylococcus epidermidis Not Detected     Staphylococcus lugdunensis Not Detected     Streptococcus species Not Detected     Streptococcus agalactiae Not Detected     Streptococcus pneumoniae Not Detected     Streptococcus pyogenes Not Detected     Acinetobacter calcoaceticus/baumannii complex Not Detected     Bacteroides fragilis Not Detected     Enterobacterales See species for ID     Enterobacter cloacae complex Not Detected     Escherichia coli Detected     Klebsiella aerogenes Not Detected     Klebsiella oxytoca Not Detected     Klebsiella pneumoniae group Not Detected     Proteus Not Detected     Salmonella sp Not Detected     Serratia marcescens Not Detected     Haemophilus influenzae Not Detected     Neisseria meningtidis Not Detected     Pseudomonas aeruginosa Not Detected     Stenotrophomonas maltophilia Not Detected     Candida albicans Not Detected     Candida auris Not Detected     Candida glabrata Not Detected     Candida krusei Not Detected     Candida parapsilosis Not Detected     Candida tropicalis Not Detected     Cryptococcus neoformans/gattii Not Detected     CTX-M (ESBL ) Detected     IMP (Carbapenem resistant) Not Detected     KPC resistance gene (Carbapenem resistant) Not Detected     mcr-1  Not Detected     mec A/C  Test not applicable     mec A/C and MREJ (MRSA) gene Test not applicable     NDM (Carbapenem resistant) Not Detected     OXA-48-like (Carbapenem resistant) Not Detected     van A/B (VRE gene) Test not applicable     VIM (Carbapenem resistant) Not Detected    Narrative:      Aerobic and anaerobic    Giardia Specific Antigen [[id number]]     Order Status: No result Specimen: Stool     Clostridium difficile EIA [[id number]]     Order Status: Canceled Specimen: Stool     Stool culture [[id number]]     Order Status: No result Specimen: Stool               Significant Imaging: I have reviewed all pertinent imaging results/findings within  the past 24 hours.    Assessment/Plan:      * Severe sepsis  This patient does have evidence of infective focus  My overall impression is  severe sepsis .  Initially presented as early sepsis, now with leukocytosis, tachycardia, hypotension, and bacteremia.  Source: Abdominal and bacteremia  Antibiotics given-   Antibiotics (72h ago, onward)      Start     Stop Route Frequency Ordered    03/17/24 1245  meropenem (MERREM) 1 g in sodium chloride 0.9 % 100 mL IVPB (MB+)         -- IV Every 8 hours (non-standard times) 03/17/24 1134          Latest lactate reviewed-  Recent Labs   Lab 03/17/24  0844   LACTATE 1.6       Lab Results   Component Value Date    .2 (H) 03/18/2024       Organ dysfunction:  None    Fluid challenge:  Total of 3L IV bolus fluid given an ED    Post- resuscitation assessment :  Hypotension improved      Will Not start Pressors- Levophed for MAP of 65  Source control achieved by:  Treating with antibiotic.  ------------------------------------------------------------------  Resolving  ID following  Continue IV Merrem   No longer requiring IV fluid   Repeat blood cultures negative so far    Bacteremia  Blood culture x2 sets (+) ESBL E coli susceptible to Merrem  Repeat Blood cultures are negative so far   Continue IV Merrem  ID following:  Awaiting recommendations    Acute gastroenteritis  Resolved  Blood pressure better and tolerating oral intake.  No longer requiring IV fluids.   Continue diet as tolerated   Monitor    Cervical strain, acute  Resolved  Neck x-ray done and was negative for anything acute      Acute headache  Currently controlled.  Continue Imitrex PRN  Monitor      Dehydration  Resolved. Due to nausea/vomiting and diarrhea. Evidenced by tachycardia and hypotension  No longer needs IV fluids  Monitor      Hypotension  Resolved/ Due to sepsis   No longer requiring IV fluids  Continue telemetry monitoring         VTE Risk Mitigation (From admission, onward)           Ordered      enoxaparin injection 40 mg  Every 12 hours         03/17/24 0918     IP VTE HIGH RISK PATIENT  Once         03/17/24 0918     Place sequential compression device  Until discontinued         03/16/24 1244     Place SALEEM hose  Until discontinued         03/16/24 1244                    Discharge Planning   ROCIO: 3/20/2024     Code Status: Full Code   Is the patient medically ready for discharge?:     Reason for patient still in hospital (select all that apply): Patient trending condition and Treatment  Discharge Plan A: Home with family                  Belen Donald NP  Department of Hospital Medicine   [facility]

## 2024-03-19 NOTE — PLAN OF CARE
Problem: Adult Inpatient Plan of Care  Goal: Plan of Care Review  Outcome: Ongoing, Progressing  Goal: Patient-Specific Goal (Individualized)  Outcome: Ongoing, Progressing  Goal: Optimal Comfort and Wellbeing  Outcome: Ongoing, Progressing     Problem: Adjustment to Illness (Sepsis/Septic Shock)  Goal: Optimal Coping  Outcome: Ongoing, Progressing     Problem: Bleeding (Sepsis/Septic Shock)  Goal: Absence of Bleeding  Outcome: Ongoing, Progressing     Problem: Infection Progression (Sepsis/Septic Shock)  Goal: Absence of Infection Signs and Symptoms  Outcome: Ongoing, Progressing     Problem: Infection  Goal: Absence of Infection Signs and Symptoms  Outcome: Ongoing, Progressing     Problem: Pain Acute  Goal: Acceptable Pain Control and Functional Ability  Outcome: Ongoing, Progressing     Problem: Fall Injury Risk  Goal: Absence of Fall and Fall-Related Injury  Outcome: Ongoing, Progressing

## 2024-03-19 NOTE — ASSESSMENT & PLAN NOTE
Blood culture x2 sets (+) ESBL E coli susceptible to Merrem  Repeat Blood cultures are negative so far   Continue IV Merrem  ID following:  Awaiting recommendations

## 2024-03-19 NOTE — CONSULTS
18 Gx 10cm PowerGlide Midline placed to pts LEFT cephalic vein with the use of ultrasound guidance.    Ultrasound guidance: yes  Vessel Caliber: large and patent, compressibility normal  Needle advanced into vessel with real time Ultrasound guidance.  Guidewire confirmed in vessel.  Image recorded and saved.  Sterile sheath used.  Sterile dressing applied  Arm circumference- 32cm  Dressing dated   Education provided to patient re: proper maintenance of line- pt verbalized understanding  Limb alert applied.

## 2024-03-19 NOTE — ASSESSMENT & PLAN NOTE
Resolved. Due to nausea/vomiting and diarrhea. Evidenced by tachycardia and hypotension  No longer needs IV fluids  Monitor

## 2024-03-19 NOTE — PLAN OF CARE
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     Referral sent to [facility] .        03/19/24 1322   Post-Acute Status   Post-Acute Authorization IV Infusion;Home Health   Home Health Status Referrals Sent   IV Infusion Status Awaiting delivery   Patient choice form signed by patient/caregiver List from [facility] Post-Acute Care

## 2024-03-19 NOTE — SUBJECTIVE & OBJECTIVE
Interval History:  Lying in bed, awake and alert.  Reports feeling good today. had BM this morning and it was normal.  Doing better with diet.  No nausea.  No longer having neck pain.  It resolved on its own during the night.  Urology consulted per ID recommendations to evaluate for recurrent UTIs and possible renal stone.  Negative so far.  No acute issues at this time.    Review of Systems   Constitutional:  Positive for appetite change. Negative for activity change, chills and fever.   Respiratory:  Negative for cough and shortness of breath.    Cardiovascular:  Negative for chest pain and palpitations.   Gastrointestinal:  Negative for abdominal pain, diarrhea, nausea and vomiting.   Genitourinary:  Negative for dysuria and hematuria.   Musculoskeletal:  Negative for back pain and gait problem.   Skin:  Negative for color change and wound.   Neurological:  Negative for dizziness, weakness and headaches.   Psychiatric/Behavioral:  Negative for dysphoric mood. The patient is not nervous/anxious.      Objective:     Vital Signs (Most Recent):  Temp: 97.9 °F (36.6 °C) (03/19/24 1126)  Pulse: 72 (03/19/24 1126)  Resp: 16 (03/19/24 1126)  BP: 134/64 (03/19/24 1126)  SpO2: 97 % (03/19/24 1126) Vital Signs (24h Range):  Temp:  [95 °F (35 °C)-98 °F (36.7 °C)] 97.9 °F (36.6 °C)  Pulse:  [69-86] 72  Resp:  [15-18] 16  SpO2:  [96 %-98 %] 97 %  BP: (105-134)/(59-75) 134/64     Weight: 106.6 kg (235 lb 0.2 oz)  Body mass index is 40.34 kg/m².    Intake/Output Summary (Last 24 hours) at 3/19/2024 1321  Last data filed at 3/19/2024 0618  Gross per 24 hour   Intake 770 ml   Output 700 ml   Net 70 ml           Physical Exam  Constitutional:       General: She is not in acute distress.  HENT:      Head: Normocephalic and atraumatic.   Eyes:      Extraocular Movements: Extraocular movements intact.      Conjunctiva/sclera: Conjunctivae normal.      Pupils: Pupils are equal, round, and reactive to light.   Cardiovascular:      Rate  and Rhythm: Normal rate and regular rhythm.      Pulses: Normal pulses.      Heart sounds: Normal heart sounds.      Comments: NSR on telemetry  Pulmonary:      Effort: Pulmonary effort is normal. No respiratory distress.      Breath sounds: Normal breath sounds.   Abdominal:      General: Bowel sounds are normal. There is no distension.      Palpations: Abdomen is soft.      Tenderness: There is no abdominal tenderness.   Musculoskeletal:         General: No swelling. Normal range of motion.      Cervical back: Normal range of motion and neck supple.   Skin:     General: Skin is warm and dry.      Capillary Refill: Capillary refill takes less than 2 seconds.   Neurological:      General: No focal deficit present.      Mental Status: She is alert and oriented to person, place, and time.      Cranial Nerves: No cranial nerve deficit.   Psychiatric:         Attention and Perception: Attention normal.         Mood and Affect: Mood and affect normal.             Significant Labs: All pertinent labs within the past 24 hours have been reviewed.  CBC:   Recent Labs   Lab 03/17/24 2115 03/18/24  0503 03/19/24  0430   WBC 13.32* 13.39* 9.24   HGB 11.7* 11.5* 11.8*   HCT 35.7* 35.9* 35.4*   PLT 94* 101* 101*       CMP:   Recent Labs   Lab 03/17/24 2115 03/18/24  0503 03/19/24  0430    138 141   K 4.3 3.8 4.1    109 110   CO2 22* 21* 22*    102 95   BUN 13 12 15   CREATININE 0.8 0.7 0.6   CALCIUM 8.7 8.6* 8.6*   PROT 6.0 5.9* 5.9*   ALBUMIN 3.0* 2.9* 2.8*   BILITOT 0.7 0.7 0.6   ALKPHOS 89 89 77   AST 20 16 14   ALT 18 15 12   ANIONGAP 8 8 9       Lab Results   Component Value Date    .2 (H) 03/18/2024         Microbiology Results (last 7 days)       Procedure Component Value Units Date/Time    Blood culture [[id number]] Collected: 03/18/24 0503    Order Status: Completed Specimen: Blood from Peripheral, Hand, Left Updated: 03/19/24 1032     Blood Culture, Routine No Growth to date      No Growth to  date    Blood culture [[id number]] Collected: 03/18/24 0503    Order Status: Completed Specimen: Blood from Peripheral, Hand, Right Updated: 03/19/24 1032     Blood Culture, Routine No Growth to date      No Growth to date    Blood culture x two cultures. Draw prior to antibiotics. [[id number]]  (Abnormal) Collected: 03/16/24 0624    Order Status: Completed Specimen: Blood from Peripheral, Antecubital, Left Updated: 03/18/24 0628     Blood Culture, Routine Gram stain aer bottle: Gram negative rods      Gram stain alta bottle: Gram negative rods      Positive results previously called TO: Faviola Handley RN      03/16/2024  23:42 TGC      ESCHERICHIA COLI ESBL  For susceptibility see order #[id number]      Narrative:      Aerobic and anaerobic    Blood culture x two cultures. Draw prior to antibiotics. [[id number]]  (Abnormal)  (Susceptibility) Collected: 03/16/24 0617    Order Status: Completed Specimen: Blood from Peripheral, Antecubital, Right Updated: 03/18/24 0628     Blood Culture, Routine Gram stain aer bottle: Gram negative rods      Gram stain alta bottle: Gram negative rods      Results called to and read back by:Faviola Handley RN  03/16/2024  15:13      JBM      ESCHERICHIA COLI ESBL    Narrative:      Aerobic and anaerobic    Clostridium difficile EIA [[id number]]     Order Status: Canceled Specimen: Stool     Stool culture [[id number]]     Order Status: No result Specimen: Stool     Rapid Organism ID by PCR (from Blood culture) [[id number]]  (Abnormal) Collected: 03/16/24 0617    Order Status: Completed Updated: 03/16/24 1749     Enterococcus faecalis Not Detected     Enterococcus faecium Not Detected     Listeria monocytogenes Not Detected     Staphylococcus spp. Not Detected     Staphylococcus aureus Not Detected     Staphylococcus epidermidis Not Detected     Staphylococcus lugdunensis Not Detected     Streptococcus species Not Detected     Streptococcus agalactiae Not Detected     Streptococcus  pneumoniae Not Detected     Streptococcus pyogenes Not Detected     Acinetobacter calcoaceticus/baumannii complex Not Detected     Bacteroides fragilis Not Detected     Enterobacterales See species for ID     Enterobacter cloacae complex Not Detected     Escherichia coli Detected     Klebsiella aerogenes Not Detected     Klebsiella oxytoca Not Detected     Klebsiella pneumoniae group Not Detected     Proteus Not Detected     Salmonella sp Not Detected     Serratia marcescens Not Detected     Haemophilus influenzae Not Detected     Neisseria meningtidis Not Detected     Pseudomonas aeruginosa Not Detected     Stenotrophomonas maltophilia Not Detected     Candida albicans Not Detected     Candida auris Not Detected     Candida glabrata Not Detected     Candida krusei Not Detected     Candida parapsilosis Not Detected     Candida tropicalis Not Detected     Cryptococcus neoformans/gattii Not Detected     CTX-M (ESBL ) Detected     IMP (Carbapenem resistant) Not Detected     KPC resistance gene (Carbapenem resistant) Not Detected     mcr-1  Not Detected     mec A/C  Test not applicable     mec A/C and MREJ (MRSA) gene Test not applicable     NDM (Carbapenem resistant) Not Detected     OXA-48-like (Carbapenem resistant) Not Detected     van A/B (VRE gene) Test not applicable     VIM (Carbapenem resistant) Not Detected    Narrative:      Aerobic and anaerobic    Giardia Specific Antigen [[id number]]     Order Status: No result Specimen: Stool     Clostridium difficile EIA [[id number]]     Order Status: Canceled Specimen: Stool     Stool culture [[id number]]     Order Status: No result Specimen: Stool               Significant Imaging: I have reviewed all pertinent imaging results/findings within the past 24 hours.

## 2024-03-19 NOTE — PLAN OF CARE
Problem: Adult Inpatient Plan of Care  Goal: Plan of Care Review  Outcome: Ongoing, Progressing  Goal: Patient-Specific Goal (Individualized)  Outcome: Ongoing, Progressing  Goal: Optimal Comfort and Wellbeing  Outcome: Ongoing, Progressing     Problem: Adjustment to Illness (Sepsis/Septic Shock)  Goal: Optimal Coping  Outcome: Ongoing, Progressing     Problem: Bleeding (Sepsis/Septic Shock)  Goal: Absence of Bleeding  Outcome: Ongoing, Progressing     Problem: Infection Progression (Sepsis/Septic Shock)  Goal: Absence of Infection Signs and Symptoms  Outcome: Ongoing, Progressing     Problem: Infection  Goal: Absence of Infection Signs and Symptoms  Outcome: Ongoing, Progressing     Problem: Pain Acute  Goal: Acceptable Pain Control and Functional Ability  Outcome: Ongoing, Progressing     Problem: Fall Injury Risk  Goal: Absence of Fall and Fall-Related Injury  Outcome: Ongoing, Progressing            POC reviewed VSS afebrile denies pain this shift tolerated meals without complications dc instructions given verbalizes understanding at this time dc to home with home infusion and home health,

## 2024-03-19 NOTE — DISCHARGE SUMMARY
On license of [facility] Medicine  Discharge Summary      Patient Name: Yenifer Chatterjee  MRN: [id number]  SHANNAN: [id number]  Patient Class: IP- Inpatient  Admission Date: 3/16/2024  Hospital Length of Stay: 3 days  Discharge Date and Time:  03/19/2024 3:00 PM  Attending Physician: Kb Tee MD   Discharging Provider: Belen Donald NP  Primary Care Provider: [facility] Primary [facility]    Primary Care Team: Networked reference to record PCT     HPI:   Ms. Chatterjee is a 58-year-old female with a past medical history of nephrolithiasis.  She was in her usual state of health when she started having abdominal pain, nausea/vomiting, and diarrhea.  The pain got progressively worse and diffuse to her back/trunk in uterus area.  She was concerned about having recurrent infection and kidney stone so she came to the hospital.  While in the ED, she had a CT of the abdomen/pelvis with IV contrast and it was negative.  She met sepsis criteria and was given IV Rocephin for possible UTI.  Her lactic acid came back elevated and she also was mildly hypotensive and bolused with IV NS.  She is being admitted to the hospital for further treatment and management.  She currently reports her nausea and abdominal pain are much better.  Still having right lower quadrant tenderness.  She report it is uterus pain. No diarrhea since being in the hospital.  Complains of having headache currently.    * No surgery found *      Hospital Course:   Ms. Chatterjee was admitted for gastroenteritis and early sepsis.  Her labs and vital signs were monitored and she was maintained on telemetry.  She was treated with IV fluid hydration and IV antiemetics PRN.  She was initially placed on IV Rocephin and Flagyl for possible GI etiology while awaiting culture results.  She had no BMs while here and so a stool specimen was unable to be collected.  Her blood culture started to grow out GNR 1 set of 2 and she also went into severe sepsis with the  development of leukocytosis and tachycardia.  In the meanwhile, her blood cultures started growing ESBL E coli.  ID was consulted for bacteremia and her antibiotic was changed to IV Merrem.  She had no further vomiting or diarrhea while here, however she continued to have intermittent nausea.  She was continued on a clear liquid diet advancing as tolerated to regular.  Infectious Disease was concerned that she has had recurrent UTIs.  A renal ultrasound was done which showed a possible renal stone, however, this was not evidenced on her abdominal CT which was done prior.  Urology recommends an outpatient evaluation for this.  Her 2nd set of blood cultures remain negative so far.  Id recommends Ertapenem 1 g IV daily for 10 days through March 27th.  Her overall condition has improved.  Her neck pain and GI symptoms are resolved.  She reports having a bowel movement this morning which was normal.  Her home infusion has been approved and set up and she is being discharged to home today in stable condition.  She we will follow up with Infectious Disease and her PCP.  A referral was placed to Urology.  She did not have any adverse events while here.     Goals of Care Treatment Preferences:  Code Status: Full Code      Consults:   Consults (From admission, onward)          Status Ordering Provider     Inpatient consult to Midline team  Once        Provider:  (Not yet assigned)    Completed MIRA ART     Inpatient consult to   Once        Provider:  (Not yet assigned)    Acknowledged DAVID GOFF     Inpatient consult to Urology  Once        Provider:  Ciro Keen Jr., MD    Completed DAVID GOFF     Inpatient consult to Infectious Diseases  Once        Provider:  Carol Nobles MD    Completed DAVID GOFF.            No new Assessment & Plan notes have been filed under this hospital service since the last note was generated.  Service: Hospital Medicine    Final Active  Diagnoses:    Diagnosis Date Noted POA    Bacteremia [R78.81] 03/16/2024 Yes      Problems Resolved During this Admission:    Diagnosis Date Noted Date Resolved POA    PRINCIPAL PROBLEM:  Severe sepsis [A41.9, R65.20] 03/16/2024 03/19/2024 Yes    Acute gastroenteritis [K52.9] 03/16/2024 03/19/2024 Yes    Cervical strain, acute [S16.1XXA] 03/18/2024 03/19/2024 No    Acute headache [R51.9] 03/17/2024 03/19/2024 No    Hypotension [I95.9] 03/16/2024 03/19/2024 Yes    Dehydration [E86.0] 03/16/2024 03/19/2024 Yes    Lactic acidosis [E87.20] 03/16/2024 03/17/2024 Yes       Discharged Condition: stable    Disposition: Home or Self Care    Follow Up:   Follow-up Information       Mildred Daly MD Follow up.    Specialty: Infectious Diseases  Why: Follow-up ID clinic/Dr. Emery April 2nd at 2:30 p.m.  Contact information:  [facility] [facility]  [address]  [place] [id number]  [phone]               No, Primary Doctor Follow up in 2 week(s).                           Patient Instructions:      Ambulatory referral/consult to Home Health   Standing Status: Future   Referral Priority: Routine Referral Type: Home Health Care   Referral Reason: Specialty Services Required   Requested Specialty: Home Health Services   Number of Visits Requested: 1     Ambulatory referral/consult to Urology   Standing Status: Future   Referral Priority: Routine Referral Type: Consultation   Referral Reason: Specialty Services Required   Requested Specialty: Urology   Number of Visits Requested: 1     Diet Adult Regular     Activity as tolerated       Significant Diagnostic Studies: Labs: CMP   Recent Labs   Lab 03/17/24  2115 03/18/24  0503 03/19/24  0430    138 141   K 4.3 3.8 4.1    109 110   CO2 22* 21* 22*    102 95   BUN 13 12 15   CREATININE 0.8 0.7 0.6   CALCIUM 8.7 8.6* 8.6*   PROT 6.0 5.9* 5.9*   ALBUMIN 3.0* 2.9* 2.8*   BILITOT 0.7 0.7 0.6   ALKPHOS 89 89 77   AST 20 16 14   ALT 18 15 12   ANIONGAP 8 8 9   ,  CBC   Recent Labs   Lab 03/17/24  2115 03/18/24  0503 03/19/24  0430   WBC 13.32* 13.39* 9.24   HGB 11.7* 11.5* 11.8*   HCT 35.7* 35.9* 35.4*   PLT 94* 101* 101*   , and All labs within the past 24 hours have been reviewed  Microbiology:   Microbiology Results (last 7 days)       Procedure Component Value Units Date/Time    Blood culture [[id number]] Collected: 03/18/24 0503    Order Status: Completed Specimen: Blood from Peripheral, Hand, Left Updated: 03/19/24 1032     Blood Culture, Routine No Growth to date      No Growth to date    Blood culture [[id number]] Collected: 03/18/24 0503    Order Status: Completed Specimen: Blood from Peripheral, Hand, Right Updated: 03/19/24 1032     Blood Culture, Routine No Growth to date      No Growth to date    Blood culture x two cultures. Draw prior to antibiotics. [[id number]]  (Abnormal) Collected: 03/16/24 0624    Order Status: Completed Specimen: Blood from Peripheral, Antecubital, Left Updated: 03/18/24 0628     Blood Culture, Routine Gram stain aer bottle: Gram negative rods      Gram stain alta bottle: Gram negative rods      Positive results previously called TO: Faviola Handley RN      03/16/2024  23:42 TGC      ESCHERICHIA COLI ESBL  For susceptibility see order #[id number]      Narrative:      Aerobic and anaerobic    Blood culture x two cultures. Draw prior to antibiotics. [[id number]]  (Abnormal)  (Susceptibility) Collected: 03/16/24 0617    Order Status: Completed Specimen: Blood from Peripheral, Antecubital, Right Updated: 03/18/24 0628     Blood Culture, Routine Gram stain aer bottle: Gram negative rods      Gram stain alta bottle: Gram negative rods      Results called to and read back by:Faviola Handley RN  03/16/2024  15:13      JBM      ESCHERICHIA COLI ESBL    Narrative:      Aerobic and anaerobic    Clostridium difficile EIA [[id number]]     Order Status: Canceled Specimen: Stool     Stool culture [[id number]]     Order Status: Canceled Specimen: Stool      Rapid Organism ID by PCR (from Blood culture) [[id number]]  (Abnormal) Collected: 03/16/24 0617    Order Status: Completed Updated: 03/16/24 7508     Enterococcus faecalis Not Detected     Enterococcus faecium Not Detected     Listeria monocytogenes Not Detected     Staphylococcus spp. Not Detected     Staphylococcus aureus Not Detected     Staphylococcus epidermidis Not Detected     Staphylococcus lugdunensis Not Detected     Streptococcus species Not Detected     Streptococcus agalactiae Not Detected     Streptococcus pneumoniae Not Detected     Streptococcus pyogenes Not Detected     Acinetobacter calcoaceticus/baumannii complex Not Detected     Bacteroides fragilis Not Detected     Enterobacterales See species for ID     Enterobacter cloacae complex Not Detected     Escherichia coli Detected     Klebsiella aerogenes Not Detected     Klebsiella oxytoca Not Detected     Klebsiella pneumoniae group Not Detected     Proteus Not Detected     Salmonella sp Not Detected     Serratia marcescens Not Detected     Haemophilus influenzae Not Detected     Neisseria meningtidis Not Detected     Pseudomonas aeruginosa Not Detected     Stenotrophomonas maltophilia Not Detected     Candida albicans Not Detected     Candida auris Not Detected     Candida glabrata Not Detected     Candida krusei Not Detected     Candida parapsilosis Not Detected     Candida tropicalis Not Detected     Cryptococcus neoformans/gattii Not Detected     CTX-M (ESBL ) Detected     IMP (Carbapenem resistant) Not Detected     KPC resistance gene (Carbapenem resistant) Not Detected     mcr-1  Not Detected     mec A/C  Test not applicable     mec A/C and MREJ (MRSA) gene Test not applicable     NDM (Carbapenem resistant) Not Detected     OXA-48-like (Carbapenem resistant) Not Detected     van A/B (VRE gene) Test not applicable     VIM (Carbapenem resistant) Not Detected    Narrative:      Aerobic and anaerobic    Giardia Specific Antigen  [[id number]]     Order Status: Canceled Specimen: Stool     Clostridium difficile EIA [[id number]]     Order Status: Canceled Specimen: Stool     Stool culture [[id number]]     Order Status: Canceled Specimen: Stool             Radiology: X-Ray Cervical Spine 2 or 3 Views  Narrative: EXAMINATION:  XR CERVICAL SPINE 2 OR 3 VIEWS    CLINICAL HISTORY:  Acute pain, limited range of motion;    TECHNIQUE:  AP, lateral and open mouth views of the cervical spine were performed.    COMPARISON:  None.    FINDINGS:  The C6 and C7 vertebral levels are not well assessed on the lateral view secondary to the overlying shoulders.  No apparent fracture, or bony destructive process.  Trace retrolisthesis of C4 on C5 and shallow endplate centered osteophytes.  Moderate disc height loss at C4-C5.  No abnormal prevertebral soft tissue thickening.  Impression: Limited radiographs without evidence of an acute process identified.    Electronically signed by: Tej Guo  Date:    03/18/2024  Time:    15:39  US Retroperitoneal Complete  Narrative: EXAMINATION:  US RETROPERITONEAL COMPLETE    CLINICAL HISTORY:  UTI; Sepsis, unspecified organism    TECHNIQUE:  Ultrasound of the kidneys and urinary bladder was performed including color flow and Doppler evaluation of the kidneys.    COMPARISON:  03/16/2024    FINDINGS:  Right kidney: The right kidney measures 10.3 cm. No cortical thinning. No loss of corticomedullary distinction. Resistive index measures 0.67.  No mass. No renal stone. No hydronephrosis.    Left kidney: The left kidney measures 11.9 cm. No cortical thinning. No loss of corticomedullary distinction. Resistive index measures 0.78.  There are at least 3 anechoic lesions between the mid and lower poles.  Largest at midpole is 2.2 cm.  There is a measured echogenic foci near the upper pole at 3 mm, with some twinkle artifact.  No hydronephrosis.    The bladder is partially distended at the time of scanning and has an unremarkable  appearance.  Impression: Query a nonshadowing stone in the left kidney, 3 mm.  Several simple left renal cysts.    Electronically signed by: Burak Rubio  Date:    03/18/2024  Time:    11:26       Pending Diagnostic Studies:       None           Medications:  Reconciled Home Medications:      Medication List        START taking these medications      sodium chloride 0.9 % PgBk 100 mL with ertapenem 1 gram SolR 1 g  Inject 1 g into the vein once daily. for 9 days            CHANGE how you take these medications      ondansetron 4 MG Tbdl  Commonly known as: ZOFRAN-ODT  Take 1 tablet (4 mg total) by mouth every 6 (six) hours as needed.  What changed: reasons to take this            CONTINUE taking these medications      fluticasone propionate 50 mcg/actuation nasal spray  Commonly known as: FLONASE  2 sprays by Each Nostril route once daily.            STOP taking these medications      oxybutynin 5 MG Tab  Commonly known as: DITROPAN     tamsulosin 0.4 mg Cap  Commonly known as: FLOMAX              Indwelling Lines/Drains at time of discharge:   Lines/Drains/Airways       None                   Time spent on the discharge of patient: 32 minutes         Belen Donald NP  Department of Hospital Medicine  [facility]/Surg

## 2024-03-19 NOTE — PROGRESS NOTES
[facility]  Department of Infectious Disease  Progress Note        PATIENT NAME: Yenifer Chatterjee  MRN: [id number]  TODAY'S DATE: 03/19/2024  ADMIT DATE: 3/16/2024    PRINCIPLE PROBLEM: Severe sepsis    INTERVAL HISTORY      3/19:  Patient seen and examined at bedside, on the phone with her .  Acknowledge about kidney ultrasound results with 3 mm nonobstructing kidney stone.  Discussed with Urology via secure chat, he mentions there has no evidence of kidney stones.  Will then establish care with Urology outpatient.  Discussed with primary team, midline ordered, anticipating ertapenem 1 g IV daily for 14 days through March 31st for complicated UTI.    3/18:  Patient discussed with Dr. Nobles, seen and examined at bedside, having kidney ultrasound performed.  She reports she has no longer feeling nauseous but she is still having cervical pain, seems musculoskeletal on her occipital area.  Hypertensive, afebrile.  Adequate urinary output, she has been constipated since Friday.  Labs reviewed, leukocytosis 13.3, left shift 82.9%, H&H 11 5/35.9, platelet count 101, thrombocytopenia, sed rate 32, CRP up to 284.2, high, procalcitonin down to 8.1.  Micro reviewed, blood cultures x2 sets from today no growth to date, pending final.  Kidney ultrasound revealed unknown shadows stone left kidney 3 mm.  Discussed with Hospital Medicine, plan for urology consult given recurrent UTIs, this is the patient's 3rd UTI in the last 4 months.    Antibiotics (From admission, onward)      Start     Stop Route Frequency Ordered    03/17/24 1245  meropenem (MERREM) 1 g in sodium chloride 0.9 % 100 mL IVPB (MB+)         -- IV Every 8 hours (non-standard times) 03/17/24 1134            Antifungals (From admission, onward)      None             Antivirals (From admission, onward)      None            ASSESSMENT and PLAN     Sepsis secondary to ESBL bacteremia in the setting of recurrent complicated UTI/3 mm left kidney,  no stone seen on CT scan   CRP 11-->243--> 284.2, high   Procalcitonin 16.7--> 8.1, trending down  Repeat blood cultures 3/18 x2 sets no growth to date, pending final  Kidney ultrasound with 3 mm nonshadowing kidney stone    Morbidly obese, BMI 40.3 kg/m2    Recommendations:  Discussed with Urology, plan to establish care outpatient for recurrent UTIs  Very low suspicion for recurrent kidney stones?  Upon discharge, midline ordered   Ertapenem 1 g IV daily for 10 days through March 27th  Weekly labs CBC with diff, CMP, CRP while on antibiotics  Weekly dressing changes  Follow-up ID clinic/Dr. Emery April 2nd at 2:30 p.m.    D/w patient at length, hospitalist/NP    Thank you for this consult. Please send [employer] secure chat with any questions.    SUBJECTIVE        Review of Systems  Review of systems obtained and negative except as stated above in Interval History      OBJECTIVE     Temp:  [95 °F (35 °C)-98.2 °F (36.8 °C)] 97.8 °F (36.6 °C)  Pulse:  [69-86] 69  Resp:  [15-18] 15  SpO2:  [95 %-98 %] 97 %  BP: (105-133)/(59-75) 132/59    Physical Exam  General:  Obese female sitting in bed, looks better compared to yesterday, on the phone with her , breathing comfortable on room air  Eyes: Eyes with no icterus or injection. Vision grossly normal  Ears: Hearing grossly normal.  Nose: Nares patent  Mouth: Moist mucous membranes, dentition is fair. No ulcerations, erythema or exudates.  Neck: Supple, no tenderness to palpation.  Cardiovascular: Regular rate and rhythm, no murmurs, no edema.    Respiratory:  Clear to auscultation bilaterally, no tachypnea or increased work of breathing.  Gastrointestinal:  Soft and obese with active bowel sounds, no tenderness to palpation, no distention.  Genitourinary:  No suprapubic tenderness.  Musculoskeletal:  Pain to palpation on occipital area, seems musculoskeletal.  Moves all extremities with good strength.    Skin:  Warm and dry, no obvious rashes.    Neuro:   Oriented,  "conversant, follows commands.  Psych: Good mood, normal affect.     WOUNDS:   VAD:  PIV  ISOLATION:  Contact/ESBL    CBC LAST 7 DAYS  Recent Labs   Lab 03/16/24  0617 03/17/24  0843 03/17/24 2115 03/18/24  0503 03/19/24  0430   WBC 6.24 14.56* 13.32* 13.39* 9.24   RBC 4.30 3.41* 3.70* 3.73* 3.73*   HGB 13.5 10.8* 11.7* 11.5* 11.8*   HCT 41.1 33.2* 35.7* 35.9* 35.4*   MCV 96 97 97 96 95   MCH 31.4* 31.7* 31.6* 30.8 31.6*   MCHC 32.8 32.5 32.8 32.0 33.3   RDW 12.6 13.2 13.0 12.9 12.5    105* 94* 101* 101*   MPV 11.0 11.1 11.3 12.3 11.7   GRAN 91.3*  5.7 90.2*  13.1* 86.1*  11.5* 82.9*  11.1* 73.8*  6.8   LYMPH 7.2*  0.5* 4.0*  0.6* 6.4*  0.9* 6.9*  0.9* 13.7*  1.3   MONO 0.5*  0.0* 4.7  0.7 5.0  0.7 5.8  0.8 7.7  0.7   BASO 0.01 0.04 0.02 0.03 0.04   NRBC 0 0 0 0 0         CHEMISTRY LAST 7 DAYS  Recent Labs   Lab 03/16/24  0617 03/17/24  0844 03/17/24 2115 03/18/24  0503 03/19/24  0430    139 140 138 141   K 4.2 4.3 4.3 3.8 4.1    110 110 109 110   CO2 21* 20* 22* 21* 22*   ANIONGAP 12 9 8 8 9   BUN 26* 17 13 12 15   CREATININE 0.8 0.8 0.8 0.7 0.6    103 102 102 95   CALCIUM 9.5 7.8* 8.7 8.6* 8.6*   MG  --  1.7  --   --   --    ALBUMIN 4.0 2.8* 3.0* 2.9* 2.8*   PROT 7.1 5.4* 6.0 5.9* 5.9*   ALKPHOS 105 69 89 89 77   ALT 16 15 18 15 12   AST 13 15 20 16 14   BILITOT 0.7 0.7 0.7 0.7 0.6         Estimated Creatinine Clearance: 118.8 mL/min (based on SCr of 0.6 mg/dL).    INFLAMMATORY/PROCAL  LAST 7 DAYS  Recent Labs   Lab 03/16/24  0954 03/17/24  0844 03/18/24  0503   PROCAL  --  16.78* 8.10*   CRP 11.0* 243.4* 284.2*       No results found for: "ESR"  CRP   Date Value Ref Range Status   03/18/2024 284.2 (H) 0.0 - 8.2 mg/L Final   03/17/2024 243.4 (H) 0.0 - 8.2 mg/L Final   03/16/2024 11.0 (H) 0.0 - 8.2 mg/L Final       PRIOR  MICROBIOLOGY:      LAST 7 DAYS MICROBIOLOGY   Microbiology Results (last 7 days)       Procedure Component Value Units Date/Time    Blood culture " [[id number]] Collected: 03/18/24 0503    Order Status: Completed Specimen: Blood from Peripheral, Hand, Right Updated: 03/18/24 1558     Blood Culture, Routine No Growth to date    Blood culture [[id number]] Collected: 03/18/24 0503    Order Status: Completed Specimen: Blood from Peripheral, Hand, Left Updated: 03/18/24 1558     Blood Culture, Routine No Growth to date    Blood culture x two cultures. Draw prior to antibiotics. [[id number]]  (Abnormal) Collected: 03/16/24 0624    Order Status: Completed Specimen: Blood from Peripheral, Antecubital, Left Updated: 03/18/24 0628     Blood Culture, Routine Gram stain aer bottle: Gram negative rods      Gram stain alta bottle: Gram negative rods      Positive results previously called TO: Faviola Handley RN      03/16/2024  23:42 TGC      ESCHERICHIA COLI ESBL  For susceptibility see order #[id number]      Narrative:      Aerobic and anaerobic    Blood culture x two cultures. Draw prior to antibiotics. [[id number]]  (Abnormal)  (Susceptibility) Collected: 03/16/24 0617    Order Status: Completed Specimen: Blood from Peripheral, Antecubital, Right Updated: 03/18/24 0628     Blood Culture, Routine Gram stain aer bottle: Gram negative rods      Gram stain alta bottle: Gram negative rods      Results called to and read back by:Faviola Handley RN  03/16/2024  15:13      JBM      ESCHERICHIA COLI ESBL    Narrative:      Aerobic and anaerobic    Clostridium difficile EIA [[id number]]     Order Status: Canceled Specimen: Stool     Stool culture [[id number]]     Order Status: No result Specimen: Stool     Rapid Organism ID by PCR (from Blood culture) [[id number]]  (Abnormal) Collected: 03/16/24 0617    Order Status: Completed Updated: 03/16/24 1749     Enterococcus faecalis Not Detected     Enterococcus faecium Not Detected     Listeria monocytogenes Not Detected     Staphylococcus spp. Not Detected     Staphylococcus aureus Not Detected     Staphylococcus epidermidis Not Detected      Staphylococcus lugdunensis Not Detected     Streptococcus species Not Detected     Streptococcus agalactiae Not Detected     Streptococcus pneumoniae Not Detected     Streptococcus pyogenes Not Detected     Acinetobacter calcoaceticus/baumannii complex Not Detected     Bacteroides fragilis Not Detected     Enterobacterales See species for ID     Enterobacter cloacae complex Not Detected     Escherichia coli Detected     Klebsiella aerogenes Not Detected     Klebsiella oxytoca Not Detected     Klebsiella pneumoniae group Not Detected     Proteus Not Detected     Salmonella sp Not Detected     Serratia marcescens Not Detected     Haemophilus influenzae Not Detected     Neisseria meningtidis Not Detected     Pseudomonas aeruginosa Not Detected     Stenotrophomonas maltophilia Not Detected     Candida albicans Not Detected     Candida auris Not Detected     Candida glabrata Not Detected     Candida krusei Not Detected     Candida parapsilosis Not Detected     Candida tropicalis Not Detected     Cryptococcus neoformans/gattii Not Detected     CTX-M (ESBL ) Detected     IMP (Carbapenem resistant) Not Detected     KPC resistance gene (Carbapenem resistant) Not Detected     mcr-1  Not Detected     mec A/C  Test not applicable     mec A/C and MREJ (MRSA) gene Test not applicable     NDM (Carbapenem resistant) Not Detected     OXA-48-like (Carbapenem resistant) Not Detected     van A/B (VRE gene) Test not applicable     VIM (Carbapenem resistant) Not Detected    Narrative:      Aerobic and anaerobic    Giardia Specific Antigen [[id number]]     Order Status: No result Specimen: Stool     Clostridium difficile EIA [[id number]]     Order Status: Canceled Specimen: Stool     Stool culture [[id number]]     Order Status: No result Specimen: Stool             SUSCEPTIBILITY  Susceptibility data from last 90 days.  Collected Specimen Info Organism Amp/Sulbactam Ampicillin Cefepime Ceftriaxone Ciprofloxacin Ertapenem  Gentamicin Levofloxacin Meropenem PIPERACILLIN/TAZO SUSCEPTIBILITY Tetracycline Tobramycin Trimeth/Sulfa   03/16/24 Blood from Peripheral, Antecubital, Left Escherichia coli ESBL                03/16/24 Blood from Peripheral, Antecubital, Right Escherichia coli ESBL  S  R  R  R  R  S  R  R  S  S  R  I  R         CURRENT/PREVIOUS VISIT EKG  Results for orders placed or performed during the hospital encounter of 09/10/22   EKG 12-lead    Collection Time: 09/10/22 10:52 PM    Narrative    Test Reason :     Vent. Rate : 090 BPM     Atrial Rate : 090 BPM     P-R Int : 124 ms          QRS Dur : 076 ms      QT Int : 364 ms       P-R-T Axes : 036 031 044 degrees     QTc Int : 445 ms    Normal sinus rhythm  Low anterior forces-cannot exclude Anterior infarct ,age undetermined but  doubt  Abnormal ECG  When compared with ECG of 04-SEP-2022 13:01,  Sinus rhythm has replaced Junctional rhythm  Anterior forces are slightly less  Confirmed by Michele OCHOA MD ([id number]) on 9/11/2022 9:29:51 AM    Referred By: [id number]   SELF           Confirmed By:Michele OCHOA MD       Significant Labs: All pertinent labs within the past 24 hours have been reviewed.     Significant Imaging: I have reviewed all relevant and available imaging results/findings within the past 24 hours.    CXR    CT abdomen/pelvis w/ IV contrast no PO contrast:  FINDINGS:  Lung bases are clear.  Normal sized heart.  Unremarkable gallbladder.  There are a few simple cysts along the left kidney cortex and pelvis.  Fatty atrophy along the proximal pancreas.  Remaining solid abdominal organs are unremarkable.  There is no enteric contrast which limits bowel assessment.  No dilated bowel loops.  There are a few scattered colonic diverticula.  Normal appendix.  Small fat containing umbilical defect.  Atrophied uterus and decompressed urinary bladder.  Mild multilevel spinal degenerative change.  Severe left hip degenerative change.    Impression:    No acute findings in the  abdomen.    Kidney US:  Right kidney: The right kidney measures 10.3 cm. No cortical thinning. No loss of corticomedullary distinction. Resistive index measures 0.67.  No mass. No renal stone. No hydronephrosis.  Left kidney: The left kidney measures 11.9 cm. No cortical thinning. No loss of corticomedullary distinction. Resistive index measures 0.78.  There are at least 3 anechoic lesions between the mid and lower poles.  Largest at midpole is 2.2 cm.  There is a measured echogenic foci near the upper pole at 3 mm, with some twinkle artifact.  No hydronephrosis.  The bladder is partially distended at the time of scanning and has an unremarkable appearance.    Impression:    Query a nonshadowing stone in the left kidney, 3 mm.  Several simple left renal cysts.        Mildred Hill MD  Date of Service: 03/19/2024

## 2024-03-19 NOTE — ASSESSMENT & PLAN NOTE
Resolved  Blood pressure better and tolerating oral intake.  No longer requiring IV fluids.   Continue diet as tolerated   Monitor

## 2024-03-19 NOTE — DISCHARGE INSTRUCTIONS
+Resume previous home medications eat balanced meals stay hydrated if symptoms worsen go to nearest emergency room

## 2024-03-19 NOTE — ASSESSMENT & PLAN NOTE
This patient does have evidence of infective focus  My overall impression is  severe sepsis .  Initially presented as early sepsis, now with leukocytosis, tachycardia, hypotension, and bacteremia.  Source: Abdominal and bacteremia  Antibiotics given-   Antibiotics (72h ago, onward)      Start     Stop Route Frequency Ordered    03/17/24 1245  meropenem (MERREM) 1 g in sodium chloride 0.9 % 100 mL IVPB (MB+)         -- IV Every 8 hours (non-standard times) 03/17/24 1134          Latest lactate reviewed-  Recent Labs   Lab 03/17/24  0844   LACTATE 1.6       Lab Results   Component Value Date    .2 (H) 03/18/2024       Organ dysfunction:  None    Fluid challenge:  Total of 3L IV bolus fluid given an ED    Post- resuscitation assessment :  Hypotension improved      Will Not start Pressors- Levophed for MAP of 65  Source control achieved by:  Treating with antibiotic.  ------------------------------------------------------------------  Resolving  ID following  Continue IV Merrem   No longer requiring IV fluid   Repeat blood cultures negative so far

## 2024-03-19 NOTE — PLAN OF CARE
Referral sent to [facility] for review via [facility]     03/19/24 1003   Post-Acute Status   Post-Acute Authorization IV Infusion   IV Infusion Status Referral(s) sent   Patient choice form signed by patient/caregiver List from System Post-Acute Care

## 2024-03-19 NOTE — PLAN OF CARE
Pt clear for DC from CM standpoint. Discharging home with .     Pt accepted by [facility] , orders sent, SOC will be 3/22.     [facility] completed teaching and state pt can be discharged home and abx will be delivered.     Hospital follow up scheduled with DC clinic for 3/28. Added to AVS.       03/19/24 4152   Final Note   Assessment Type Final Discharge Note   Anticipated Discharge Disposition [facility]   Hospital Resources/Appts/Education Provided Appointments scheduled and added to AVS   Post-Acute Status   Post-Acute Authorization Home Health;IV Infusion   Home Health Status Set-up Complete/Auth obtained   IV Infusion Status Set-up Complete/Auth obtained

## 2024-03-21 LAB
OHS QRS DURATION: 122 MS
OHS QTC CALCULATION: 507 MS

## 2024-03-23 LAB
BACTERIA BLD CULT: NORMAL
BACTERIA BLD CULT: NORMAL

## 2024-03-25 ENCOUNTER — LAB VISIT (OUTPATIENT)
Dept: LAB | Facility: HOSPITAL | Age: 59
End: 2024-03-25
Attending: NURSE PRACTITIONER
Payer: COMMERCIAL

## 2024-03-25 DIAGNOSIS — R78.81 BACTEREMIA: Primary | ICD-10-CM

## 2024-03-25 LAB
ALBUMIN SERPL BCP-MCNC: 3.4 G/DL (ref 3.5–5.2)
ALP SERPL-CCNC: 87 U/L (ref 55–135)
ALT SERPL W/O P-5'-P-CCNC: 23 U/L (ref 10–44)
ANION GAP SERPL CALC-SCNC: 13 MMOL/L (ref 8–16)
AST SERPL-CCNC: 16 U/L (ref 10–40)
BASOPHILS # BLD AUTO: 0.05 K/UL (ref 0–0.2)
BASOPHILS NFR BLD: 0.5 % (ref 0–1.9)
BILIRUB SERPL-MCNC: 0.6 MG/DL (ref 0.1–1)
BUN SERPL-MCNC: 16 MG/DL (ref 6–20)
CALCIUM SERPL-MCNC: 9.1 MG/DL (ref 8.7–10.5)
CHLORIDE SERPL-SCNC: 106 MMOL/L (ref 95–110)
CO2 SERPL-SCNC: 21 MMOL/L (ref 23–29)
CREAT SERPL-MCNC: 0.7 MG/DL (ref 0.5–1.4)
CRP SERPL-MCNC: 44.6 MG/L (ref 0–8.2)
DIFFERENTIAL METHOD BLD: ABNORMAL
EOSINOPHIL # BLD AUTO: 0.2 K/UL (ref 0–0.5)
EOSINOPHIL NFR BLD: 2 % (ref 0–8)
ERYTHROCYTE [DISTWIDTH] IN BLOOD BY AUTOMATED COUNT: 12.7 % (ref 11.5–14.5)
EST. GFR  (NO RACE VARIABLE): >60 ML/MIN/1.73 M^2
GLUCOSE SERPL-MCNC: 92 MG/DL (ref 70–110)
HCT VFR BLD AUTO: 41.9 % (ref 37–48.5)
HGB BLD-MCNC: 13.4 G/DL (ref 12–16)
IMM GRANULOCYTES # BLD AUTO: 0.06 K/UL (ref 0–0.04)
IMM GRANULOCYTES NFR BLD AUTO: 0.6 % (ref 0–0.5)
LYMPHOCYTES # BLD AUTO: 1.5 K/UL (ref 1–4.8)
LYMPHOCYTES NFR BLD: 15.9 % (ref 18–48)
MCH RBC QN AUTO: 30.6 PG (ref 27–31)
MCHC RBC AUTO-ENTMCNC: 32 G/DL (ref 32–36)
MCV RBC AUTO: 96 FL (ref 82–98)
MONOCYTES # BLD AUTO: 0.7 K/UL (ref 0.3–1)
MONOCYTES NFR BLD: 6.8 % (ref 4–15)
NEUTROPHILS # BLD AUTO: 7.2 K/UL (ref 1.8–7.7)
NEUTROPHILS NFR BLD: 74.2 % (ref 38–73)
NRBC BLD-RTO: 0 /100 WBC
PLATELET # BLD AUTO: 224 K/UL (ref 150–450)
PMV BLD AUTO: 11.3 FL (ref 9.2–12.9)
POTASSIUM SERPL-SCNC: 3.7 MMOL/L (ref 3.5–5.1)
PROT SERPL-MCNC: 7 G/DL (ref 6–8.4)
RBC # BLD AUTO: 4.38 M/UL (ref 4–5.4)
SODIUM SERPL-SCNC: 140 MMOL/L (ref 136–145)
WBC # BLD AUTO: 9.66 K/UL (ref 3.9–12.7)

## 2024-03-25 PROCEDURE — 36415 COLL VENOUS BLD VENIPUNCTURE: CPT | Performed by: NURSE PRACTITIONER

## 2024-03-25 PROCEDURE — 80053 COMPREHEN METABOLIC PANEL: CPT | Performed by: NURSE PRACTITIONER

## 2024-03-25 PROCEDURE — 86140 C-REACTIVE PROTEIN: CPT | Performed by: NURSE PRACTITIONER

## 2024-03-25 PROCEDURE — 85025 COMPLETE CBC W/AUTO DIFF WBC: CPT | Performed by: NURSE PRACTITIONER

## 2024-03-27 NOTE — PHYSICIAN QUERY
PT Name: Yenifer Chatterjee  MR #: [id number]     DOCUMENTATION CLARIFICATION     CDS/: Mary Monae               Contact information:  This form is a permanent document in the medical record.     Query Date: March 27, 2024    By submitting this query, we are merely seeking further clarification of documentation.  Please utilize your independent clinical judgment when addressing the question(s) below.  The Medical Record contains the following:  Indicators Supporting Clinical Findings Location in Medical Record    HR         RR          BP        Temp Initial Vitals [03/16/24 0537]  BP Pulse Resp Temp SpO2  (!) 141/63 (!) 116 20 98.2 °F (36.8 °C) 99 %  ED note    Lactic Acid          Procalcitonin 3.2 - 3/16; 16.78 - 3/17 Lab Results    WBC           Bands          CRP  6.24 WBC Lab Results 3/16    Culture(s) ESCHERICHIA COLI ESBL  Lab Results 3/16    AMS, Confusion, LOC, etc. Psychiatric/Behavioral:Negative for agitation, confusion, hallucinations and sleep disturbance.  H&P    Organ Dysfunction/Failure None     Bacteremia or Sepsis / Septic Sepsis      This patient does have evidence of infective focus  My overall impression is sepsis.  Appears to be early sepsis.  Source: Abdominal  Antibiotics given-       Organ dysfunction:  None  Fluid challenge:  Total of 3L IV bolus fluid given an ED  Post- resuscitation assessment :  Hypotension improved    Her blood culture started to grow out GNR 1 set of 2 and she also went into severe sepsis with the development of leukocytosis and tachycardia. In the meanwhile, her blood cultures started growing ESBL E coli. ID was consulted for bacteremia and her antibiotic was changed to IV Merrem     Final Active Diagnoses:     Diagnosis Date Noted POA    Bacteremia [R78.81]        PRINCIPAL PROBLEM:  Severe sepsis [A41.9, R65.20]    Assessment & Plan Note by Belen Donald NP  3/16/2024              Discharge Summary    Known or Suspected Source of Infection documented  Possible UTI     Other Hypotension         Provider, please specify diagnosis or diagnoses associated with above clinical findings.  [   ] Sepsis due to unknown organism   [   ] Sepsis due to (suspected) organism (please specify):      [  X ] Severe Sepsis with Acute Organ Dysfunction/Failure (please specify organ dysfunction/failure):severe sepsis-- Evidenced by leukocytosis, tachycardia, hypotension, and ESBL E coli bacteremia.   No organ Dysfunction.     [   ] Sepsis with Septic Shock   [   ] Bacteremia without Sepsis   [   ] Bacteremia with Sepsis   [   ] SIRS without infectious etiology   [   ] SIRS with infection but without Sepsis   [   ] Other Infectious Disease (please specify):    [   ] Sepsis Ruled Out

## 2024-03-28 ENCOUNTER — OFFICE VISIT (OUTPATIENT)
Dept: PRIMARY CARE CLINIC | Facility: CLINIC | Age: 59
End: 2024-03-28
Payer: COMMERCIAL

## 2024-03-28 VITALS
WEIGHT: 226.44 LBS | OXYGEN SATURATION: 97 % | DIASTOLIC BLOOD PRESSURE: 60 MMHG | TEMPERATURE: 98 F | BODY MASS INDEX: 38.66 KG/M2 | SYSTOLIC BLOOD PRESSURE: 120 MMHG | HEIGHT: 64 IN | HEART RATE: 77 BPM

## 2024-03-28 DIAGNOSIS — B96.20 BACTEREMIA DUE TO ESCHERICHIA COLI: Primary | ICD-10-CM

## 2024-03-28 DIAGNOSIS — R78.81 BACTEREMIA DUE TO ESCHERICHIA COLI: Primary | ICD-10-CM

## 2024-03-28 DIAGNOSIS — N39.0 UTI (URINARY TRACT INFECTION), BACTERIAL: ICD-10-CM

## 2024-03-28 DIAGNOSIS — A49.9 UTI (URINARY TRACT INFECTION), BACTERIAL: ICD-10-CM

## 2024-03-28 PROCEDURE — 99999 PR PBB SHADOW E&M-EST. PATIENT-LVL III: CPT | Mod: PBBFAC,,, | Performed by: NURSE PRACTITIONER

## 2024-03-28 PROCEDURE — 99214 OFFICE O/P EST MOD 30 MIN: CPT | Mod: S$GLB,,, | Performed by: NURSE PRACTITIONER

## 2024-04-01 NOTE — PROGRESS NOTES
Transitional Care Note  Subjective:       Patient ID: Yenifer Chatterjee is a 58 y.o. female.  Chief Complaint: Hospital Follow Up    Family and/or Caretaker present at visit?  Yes.  Diagnostic tests reviewed/disposition: No diagnosic tests pending after this hospitalization.  Disease/illness education: yes  Home health/community services discussion/referrals: Patient has home health established at [facility] [place] .   Establishment or re-establishment of referral orders for community resources: No other necessary community resources.   Discussion with other health care providers:  Dr. Mildred Emery via secure chat .   Yenifer Chatterjee presents for follow up to hospitalization with discharge date 3/18/24 during which she was treated for gastroenteritis/sepsis.  She was placed on IV abx.  She was also found to have a UTI.  During the course of her admission, blood cultures grew E. Coli ESBL and ID was consulted.  Abx were adjusted appropriately and her condition improved.  Repeat bc remained no growth.  She is getting IV abx at home.  Her midline infiltrated the day after she arrived home and she currently has a PIV.  She is unclear as to the duration of her abx.  She denies fevers, N/V/D or any other symptoms.  States she is feeling better.  She does have a referral to urology scheduled to further discuss possible renal stone seen on ultrasound.      Review of Systems   Constitutional:  Negative for chills, fatigue and fever.   HENT:  Negative for congestion, ear pain, mouth sores, postnasal drip, rhinorrhea, sinus pressure and sore throat.    Eyes:  Negative for photophobia, discharge, itching and visual disturbance.   Respiratory:  Negative for cough, chest tightness, shortness of breath and wheezing.    Cardiovascular:  Negative for chest pain, palpitations and leg swelling.   Gastrointestinal:  Negative for abdominal distention, abdominal pain, blood in stool, constipation, diarrhea, nausea and vomiting.    Endocrine: Negative for cold intolerance, polydipsia and polyuria.   Genitourinary:  Negative for difficulty urinating, dysuria, flank pain, frequency and urgency.   Musculoskeletal:  Negative for arthralgias, back pain, joint swelling, neck pain and neck stiffness.   Skin:  Negative for color change, pallor, rash and wound.   Allergic/Immunologic: Negative for environmental allergies and food allergies.   Neurological:  Negative for dizziness, syncope, speech difficulty, weakness, light-headedness, numbness and headaches.   Hematological:  Negative for adenopathy.   Psychiatric/Behavioral:  Negative for agitation, confusion, sleep disturbance and suicidal ideas. The patient is not nervous/anxious.        Objective:      Physical Exam  Constitutional:       General: She is not in acute distress.     Appearance: She is well-developed. She is not toxic-appearing.   HENT:      Head: Normocephalic and atraumatic.   Eyes:      Conjunctiva/sclera: Conjunctivae normal.      Pupils: Pupils are equal, round, and reactive to light.   Cardiovascular:      Rate and Rhythm: Normal rate and regular rhythm.      Heart sounds: Normal heart sounds.   Pulmonary:      Effort: Pulmonary effort is normal.      Breath sounds: Normal breath sounds.   Abdominal:      General: Bowel sounds are normal.      Palpations: Abdomen is soft.   Musculoskeletal:         General: Normal range of motion.      Cervical back: Normal range of motion and neck supple.   Skin:     General: Skin is warm and dry.   Neurological:      Mental Status: She is alert and oriented to person, place, and time.   Psychiatric:         Behavior: Behavior normal.         Assessment:       1. Bacteremia due to Escherichia coli    2. UTI (urinary tract infection), bacterial        Plan:       Verified outpatient abx course with Dr. Emery.  Abx complete.  PIV discontinued, catheter intact.  She has f/u with Dr. Emery next week as well as urology.  No further needs from  [facility] at this time.

## 2024-04-02 ENCOUNTER — LAB VISIT (OUTPATIENT)
Dept: LAB | Facility: HOSPITAL | Age: 59
End: 2024-04-02
Attending: STUDENT IN AN ORGANIZED HEALTH CARE EDUCATION/TRAINING PROGRAM
Payer: COMMERCIAL

## 2024-04-02 ENCOUNTER — OFFICE VISIT (OUTPATIENT)
Dept: INFECTIOUS DISEASES | Facility: CLINIC | Age: 59
End: 2024-04-02
Payer: COMMERCIAL

## 2024-04-02 VITALS
HEART RATE: 83 BPM | DIASTOLIC BLOOD PRESSURE: 68 MMHG | SYSTOLIC BLOOD PRESSURE: 122 MMHG | WEIGHT: 228.31 LBS | BODY MASS INDEX: 38.98 KG/M2 | HEIGHT: 64 IN | TEMPERATURE: 97 F

## 2024-04-02 DIAGNOSIS — N12 PYELONEPHRITIS: Primary | ICD-10-CM

## 2024-04-02 DIAGNOSIS — R78.81 BACTEREMIA: ICD-10-CM

## 2024-04-02 DIAGNOSIS — N12 PYELONEPHRITIS: ICD-10-CM

## 2024-04-02 DIAGNOSIS — Z16.12 INFECTION DUE TO ESBL-PRODUCING ESCHERICHIA COLI: ICD-10-CM

## 2024-04-02 DIAGNOSIS — A49.8 INFECTION DUE TO ESBL-PRODUCING ESCHERICHIA COLI: ICD-10-CM

## 2024-04-02 LAB
ALBUMIN SERPL BCP-MCNC: 4 G/DL (ref 3.5–5.2)
ALP SERPL-CCNC: 88 U/L (ref 55–135)
ALT SERPL W/O P-5'-P-CCNC: 16 U/L (ref 10–44)
ANION GAP SERPL CALC-SCNC: 6 MMOL/L (ref 8–16)
AST SERPL-CCNC: 15 U/L (ref 10–40)
BASOPHILS # BLD AUTO: 0.03 K/UL (ref 0–0.2)
BASOPHILS NFR BLD: 0.4 % (ref 0–1.9)
BILIRUB SERPL-MCNC: 0.5 MG/DL (ref 0.1–1)
BUN SERPL-MCNC: 13 MG/DL (ref 6–20)
CALCIUM SERPL-MCNC: 9.1 MG/DL (ref 8.7–10.5)
CHLORIDE SERPL-SCNC: 107 MMOL/L (ref 95–110)
CO2 SERPL-SCNC: 27 MMOL/L (ref 23–29)
CREAT SERPL-MCNC: 0.7 MG/DL (ref 0.5–1.4)
CRP SERPL-MCNC: 2.3 MG/DL
DIFFERENTIAL METHOD BLD: ABNORMAL
EOSINOPHIL # BLD AUTO: 0.2 K/UL (ref 0–0.5)
EOSINOPHIL NFR BLD: 2.5 % (ref 0–8)
ERYTHROCYTE [DISTWIDTH] IN BLOOD BY AUTOMATED COUNT: 12.8 % (ref 11.5–14.5)
EST. GFR  (NO RACE VARIABLE): >60 ML/MIN/1.73 M^2
GLUCOSE SERPL-MCNC: 97 MG/DL (ref 70–110)
HCT VFR BLD AUTO: 39.7 % (ref 37–48.5)
HGB BLD-MCNC: 12.5 G/DL (ref 12–16)
IMM GRANULOCYTES # BLD AUTO: 0.03 K/UL (ref 0–0.04)
IMM GRANULOCYTES NFR BLD AUTO: 0.4 % (ref 0–0.5)
LYMPHOCYTES # BLD AUTO: 1.5 K/UL (ref 1–4.8)
LYMPHOCYTES NFR BLD: 21.1 % (ref 18–48)
MCH RBC QN AUTO: 30.3 PG (ref 27–31)
MCHC RBC AUTO-ENTMCNC: 31.5 G/DL (ref 32–36)
MCV RBC AUTO: 96 FL (ref 82–98)
MONOCYTES # BLD AUTO: 0.9 K/UL (ref 0.3–1)
MONOCYTES NFR BLD: 12.9 % (ref 4–15)
NEUTROPHILS # BLD AUTO: 4.6 K/UL (ref 1.8–7.7)
NEUTROPHILS NFR BLD: 62.7 % (ref 38–73)
NRBC BLD-RTO: 0 /100 WBC
PLATELET # BLD AUTO: 277 K/UL (ref 150–450)
PMV BLD AUTO: 11.1 FL (ref 9.2–12.9)
POTASSIUM SERPL-SCNC: 3.7 MMOL/L (ref 3.5–5.1)
PROT SERPL-MCNC: 7.3 G/DL (ref 6–8.4)
RBC # BLD AUTO: 4.12 M/UL (ref 4–5.4)
SODIUM SERPL-SCNC: 140 MMOL/L (ref 136–145)
WBC # BLD AUTO: 7.29 K/UL (ref 3.9–12.7)

## 2024-04-02 PROCEDURE — 86140 C-REACTIVE PROTEIN: CPT | Performed by: STUDENT IN AN ORGANIZED HEALTH CARE EDUCATION/TRAINING PROGRAM

## 2024-04-02 PROCEDURE — 99214 OFFICE O/P EST MOD 30 MIN: CPT | Mod: S$GLB,,, | Performed by: STUDENT IN AN ORGANIZED HEALTH CARE EDUCATION/TRAINING PROGRAM

## 2024-04-02 PROCEDURE — 80053 COMPREHEN METABOLIC PANEL: CPT | Performed by: STUDENT IN AN ORGANIZED HEALTH CARE EDUCATION/TRAINING PROGRAM

## 2024-04-02 PROCEDURE — 36415 COLL VENOUS BLD VENIPUNCTURE: CPT | Performed by: STUDENT IN AN ORGANIZED HEALTH CARE EDUCATION/TRAINING PROGRAM

## 2024-04-02 PROCEDURE — 85025 COMPLETE CBC W/AUTO DIFF WBC: CPT | Performed by: STUDENT IN AN ORGANIZED HEALTH CARE EDUCATION/TRAINING PROGRAM

## 2024-04-02 NOTE — PROGRESS NOTES
Hospital Follow up         HPI: Yenifer Chatterjee is a 58 y.o. female , obese, BMI 40 kg/m2, with past medical history of recurrent UTIs, has had 3 in the last 6 months, prior right kidney stones, had stent placement before.  History of sepsis in 2022 from complicated UTI/pyelonephritis.  She is known to our service from recent admission to [facility] for sepsis secondary to ESBL bacteremia in the setting of recurrent complicated UTIs.  As per CT scan there were no stones identified although kidney ultrasound revealed query 3 mm, unable to exclude stone.  She was discharged home on ertapenem via midline for 10 days which she completed on March 27th.  Weekly labs reviewed, normal white count, left shift down to 70%, CRP down to 40%.  Stable kidney function and liver function tests.    Patient is here for follow-up, has no acute complaints.  Reports she is back to her baseline.  Awaiting appointment with Urology, we will place referral.  She denies any constitutional symptoms, no fever chills, nausea or vomiting, no flank pain, no dysuria increased urinary frequency.  She does note decreased urinary volume although reports not drinking enough water as she should.     Will obtain blood work today, and we will refer to Urology.    Review of patient's allergies indicates:  No Known Allergies  No past medical history on file.  Past Surgical History:   Procedure Laterality Date    CYSTOSCOPY N/A 9/23/2022    Procedure: CYSTOSCOPY;  Surgeon: Woo Ramirez MD;  Location: [facility] OR [address] [place] [address];  Service: Urology;  Laterality: N/A;    CYSTOSCOPY W/ URETERAL STENT PLACEMENT Right 9/4/2022    Procedure: CYSTOSCOPY, WITH URETERAL STENT INSERTION;  Surgeon: Ree Pak MD;  Location: [facility] OR [address] [place] [address];  Service: Urology;  Laterality: Right;    LASER LITHOTRIPSY Right 9/23/2022    Procedure: LITHOTRIPSY, USING LASER;  Surgeon: Woo Ramirez MD;  Location: [facility] OR [address] [place] [address];  Service: Urology;  Laterality: Right;     "PYELOSCOPY Right 9/23/2022    Procedure: PYELOSCOPY;  Surgeon: Woo Ramirez MD;  Location: [facility] OR [address] [place] [address];  Service: Urology;  Laterality: Right;    RETROGRADE PYELOGRAPHY Right 9/4/2022    Procedure: PYELOGRAM, RETROGRADE;  Surgeon: Ree Pak MD;  [facility];  Service: Urology;  Laterality: Right;    RETROGRADE PYELOGRAPHY Right 9/23/2022    Procedure: PYELOGRAM, RETROGRADE;  Surgeon: Woo Ramirez MD;  Location: [facility] OR [address] [place] [address];  Service: Urology;  Laterality: Right;    URETEROSCOPIC REMOVAL OF URETERIC CALCULUS Right 9/23/2022    Procedure: REMOVAL, CALCULUS, URETER, URETEROSCOPIC;  Surgeon: Woo Ramirez MD;  Location: [facility] OR [address] [place] [address];  Service: Urology;  Laterality: Right;    URETEROSCOPY Right 9/23/2022    Procedure: URETEROSCOPY;  Surgeon: Woo Ramirez MD;  Location: [facility] OR [address] [place] [address];  Service: Urology;  Laterality: Right;      Social History     Tobacco Use    Smoking status: Never    Smokeless tobacco: Never   Substance Use Topics    Alcohol use: No     Family History   Problem Relation Age of Onset    Diabetes Mother     Hypertension Mother     Diabetes Father     Hypertension Father      Review of Systems   Constitutional:  Negative for chills and fever.   HENT:  Negative for sinus pain.    Respiratory:  Negative for cough and shortness of breath.    Cardiovascular:  Negative for chest pain and leg swelling.   Gastrointestinal:  Negative for abdominal pain, diarrhea and nausea.   Genitourinary:  Negative for dysuria and frequency.   Musculoskeletal:  Negative for back pain.   Neurological:  Negative for headaches.         No TB exposure  Outdoor activities: Non smoking, no alcohol, no drugs. Works as  at [employer].   Travel: None  Implants: None   Antibiotic History: Ertapenem 1g IV daily for 10 days.      Objective:      Blood pressure 122/68, pulse 83, temperature 96.9 °F (36.1 °C), height 5' 4" (1.626 m), weight 103.6 kg (228 lb 4.8 oz), SpO2 " (P) 97 %. Body mass index is 39.19 kg/m².  Physical Exam  Constitutional:       Appearance: Normal appearance. She is obese.   HENT:      Mouth/Throat:      Pharynx: Oropharynx is clear.   Eyes:      Extraocular Movements: Extraocular movements intact.      Pupils: Pupils are equal, round, and reactive to light.   Cardiovascular:      Rate and Rhythm: Normal rate and regular rhythm.      Pulses: Normal pulses.      Heart sounds: Normal heart sounds.   Pulmonary:      Effort: Pulmonary effort is normal.      Breath sounds: Normal breath sounds.   Abdominal:      General: Bowel sounds are normal.      Palpations: Abdomen is soft.      Tenderness: There is no abdominal tenderness. There is no rebound.      Comments: Obese   Musculoskeletal:      Cervical back: Normal range of motion and neck supple.      Right lower leg: No edema.      Left lower leg: No edema.   Skin:     General: Skin is warm.      Capillary Refill: Capillary refill takes less than 2 seconds.      Findings: No rash.   Neurological:      Mental Status: She is alert and oriented to person, place, and time. Mental status is at baseline.      Sensory: No sensory deficit.      Motor: No weakness.   Psychiatric:         Thought Content: Thought content normal.             General Labs reviewed:  Lab Results   Component Value Date    WBC 9.66 03/25/2024    RBC 4.38 03/25/2024    HGB 13.4 03/25/2024    HCT 41.9 03/25/2024    MCV 96 03/25/2024    MCH 30.6 03/25/2024    MCHC 32.0 03/25/2024    RDW 12.7 03/25/2024     03/25/2024    MPV 11.3 03/25/2024    GRAN 7.2 03/25/2024    GRAN 74.2 (H) 03/25/2024    LYMPH 1.5 03/25/2024    LYMPH 15.9 (L) 03/25/2024    MONO 0.7 03/25/2024    MONO 6.8 03/25/2024    EOS 0.2 03/25/2024    BASO 0.05 03/25/2024    EOSINOPHIL 2.0 03/25/2024    BASOPHIL 0.5 03/25/2024     CMP  Sodium   Date Value Ref Range Status   03/25/2024 140 136 - 145 mmol/L Final     Potassium   Date Value Ref Range Status   03/25/2024 3.7 3.5 - 5.1  mmol/L Final     Chloride   Date Value Ref Range Status   03/25/2024 106 95 - 110 mmol/L Final     CO2   Date Value Ref Range Status   03/25/2024 21 (L) 23 - 29 mmol/L Final     Glucose   Date Value Ref Range Status   03/25/2024 92 70 - 110 mg/dL Final     BUN   Date Value Ref Range Status   03/25/2024 16 6 - 20 mg/dL Final     Creatinine   Date Value Ref Range Status   03/25/2024 0.7 0.5 - 1.4 mg/dL Final     Calcium   Date Value Ref Range Status   03/25/2024 9.1 8.7 - 10.5 mg/dL Final     Total Protein   Date Value Ref Range Status   03/25/2024 7.0 6.0 - 8.4 g/dL Final     Albumin   Date Value Ref Range Status   03/25/2024 3.4 (L) 3.5 - 5.2 g/dL Final     Total Bilirubin   Date Value Ref Range Status   03/25/2024 0.6 0.1 - 1.0 mg/dL Final     Comment:     For infants and newborns, interpretation of results should be based  on gestational age, weight and in agreement with clinical  observations.    Premature Infant recommended reference ranges:  Up to 24 hours.............<8.0 mg/dL  Up to 48 hours............<12.0 mg/dL  3-5 days..................<15.0 mg/dL  6-29 days.................<15.0 mg/dL       Alkaline Phosphatase   Date Value Ref Range Status   03/25/2024 87 55 - 135 U/L Final     AST   Date Value Ref Range Status   03/25/2024 16 10 - 40 U/L Final     ALT   Date Value Ref Range Status   03/25/2024 23 10 - 44 U/L Final     Anion Gap   Date Value Ref Range Status   03/25/2024 13 8 - 16 mmol/L Final     eGFR   Date Value Ref Range Status   03/25/2024 >60 >60 mL/min/1.73 m^2 Final       Micro:         Procedure Component Value Units Date/Time     Blood culture [[id number]] Collected: 03/18/24 0503     Order Status: Completed Specimen: Blood from Peripheral, Hand, Right Updated: 03/18/24 1558       Blood Culture, Routine No Growth to date     Blood culture [[id number]] Collected: 03/18/24 0503     Order Status: Completed Specimen: Blood from Peripheral, Hand, Left Updated: 03/18/24 1558       Blood Culture,  Routine No Growth to date     Blood culture x two cultures. Draw prior to antibiotics. [[id number]]  (Abnormal) Collected: 03/16/24 0624     Order Status: Completed Specimen: Blood from Peripheral, Antecubital, Left Updated: 03/18/24 0628       Blood Culture, Routine Gram stain aer bottle: Gram negative rods         Gram stain alta bottle: Gram negative rods         Positive results previously called TO: Faviola Handley RN         03/16/2024  23:42 TGC         ESCHERICHIA COLI ESBL  For susceptibility see order #[id number]       Narrative:       Aerobic and anaerobic     Blood culture x two cultures. Draw prior to antibiotics. [[id number]]  (Abnormal)  (Susceptibility) Collected: 03/16/24 0617     Order Status: Completed Specimen: Blood from Peripheral, Antecubital, Right Updated: 03/18/24 0628       Blood Culture, Routine Gram stain aer bottle: Gram negative rods         Gram stain alta bottle: Gram negative rods         Results called to and read back by:Faviola Handley RN  03/16/2024  15:13         JBM         ESCHERICHIA COLI ESBL     Narrative:       Aerobic and anaerobic     Clostridium difficile EIA [[id number]]       Order Status: Canceled Specimen: Stool       Stool culture [[id number]]       Order Status: No result Specimen: Stool       Rapid Organism ID by PCR (from Blood culture) [[id number]]  (Abnormal) Collected: 03/16/24 0617     Order Status: Completed Updated: 03/16/24 1749       Enterococcus faecalis Not Detected       Enterococcus faecium Not Detected       Listeria monocytogenes Not Detected       Staphylococcus spp. Not Detected       Staphylococcus aureus Not Detected       Staphylococcus epidermidis Not Detected       Staphylococcus lugdunensis Not Detected       Streptococcus species Not Detected       Streptococcus agalactiae Not Detected       Streptococcus pneumoniae Not Detected       Streptococcus pyogenes Not Detected       Acinetobacter calcoaceticus/baumannii complex Not Detected        Bacteroides fragilis Not Detected       Enterobacterales See species for ID       Enterobacter cloacae complex Not Detected       Escherichia coli Detected       Klebsiella aerogenes Not Detected       Klebsiella oxytoca Not Detected       Klebsiella pneumoniae group Not Detected       Proteus Not Detected       Salmonella sp Not Detected       Serratia marcescens Not Detected       Haemophilus influenzae Not Detected       Neisseria meningtidis Not Detected       Pseudomonas aeruginosa Not Detected       Stenotrophomonas maltophilia Not Detected       Candida albicans Not Detected       Candida auris Not Detected       Candida glabrata Not Detected       Candida krusei Not Detected       Candida parapsilosis Not Detected       Candida tropicalis Not Detected       Cryptococcus neoformans/gattii Not Detected       CTX-M (ESBL ) Detected       IMP (Carbapenem resistant) Not Detected       KPC resistance gene (Carbapenem resistant) Not Detected       mcr-1  Not Detected       mec A/C  Test not applicable       mec A/C and MREJ (MRSA) gene Test not applicable       NDM (Carbapenem resistant) Not Detected       OXA-48-like (Carbapenem resistant) Not Detected       van A/B (VRE gene) Test not applicable       VIM (Carbapenem resistant) Not Detected     Narrative:       Aerobic and anaerobic     Giardia Specific Antigen [[id number]]       Order Status: No result Specimen: Stool       Clostridium difficile EIA [[id number]]       Order Status: Canceled Specimen: Stool       Stool culture [[id number]]       Order Status: No result Specimen: Stool                  SUSCEPTIBILITY  Susceptibility data from last 90 days.  Collected Specimen Info Organism Amp/Sulbactam Ampicillin Cefepime Ceftriaxone Ciprofloxacin Ertapenem Gentamicin Levofloxacin Meropenem PIPERACILLIN/TAZO SUSCEPTIBILITY Tetracycline Tobramycin Trimeth/Sulfa   03/16/24 Blood from Peripheral, Antecubital, Left Escherichia coli ESBL                              03/16/24 Blood from Peripheral, Antecubital, Right Escherichia coli ESBL  S  R  R  R  R  S  R  R  S  S  R  I  R         Imaging Reviewed:  CXR     CT abdomen/pelvis w/ IV contrast no PO contrast:  FINDINGS:  Lung bases are clear.  Normal sized heart.  Unremarkable gallbladder.  There are a few simple cysts along the left kidney cortex and pelvis.  Fatty atrophy along the proximal pancreas.  Remaining solid abdominal organs are unremarkable.  There is no enteric contrast which limits bowel assessment.  No dilated bowel loops.  There are a few scattered colonic diverticula.  Normal appendix.  Small fat containing umbilical defect.  Atrophied uterus and decompressed urinary bladder.  Mild multilevel spinal degenerative change.  Severe left hip degenerative change.     Impression:    No acute findings in the abdomen.     Kidney US:  Right kidney: The right kidney measures 10.3 cm. No cortical thinning. No loss of corticomedullary distinction. Resistive index measures 0.67.  No mass. No renal stone. No hydronephrosis.  Left kidney: The left kidney measures 11.9 cm. No cortical thinning. No loss of corticomedullary distinction. Resistive index measures 0.78.  There are at least 3 anechoic lesions between the mid and lower poles.  Largest at midpole is 2.2 cm.  There is a measured echogenic foci near the upper pole at 3 mm, with some twinkle artifact.  No hydronephrosis.  The bladder is partially distended at the time of scanning and has an unremarkable appearance.     Impression:    Query a nonshadowing stone in the left kidney, 3 mm.  Several simple left renal cysts.        Assessment & Plan:       H/o sepsis secondary to ESBL bacteremia in the setting of recurrent complicated UTI/3 mm left kidney, no stone seen on CT scan              CRP 11-->243--> 284.2-->40, trending down   high   Procalcitonin 16.7--> 8.1, trending down  Kidney ultrasound with 3 mm nonshadowing kidney stone  Status post 10 days of ertapenem IV    Urology referral for recurrent UTIs   Encouraged to drink p.o. fluids, including cranberry juice  Alarm symptoms given to the patient  RTC in 3 months     Morbidly obese, BMI 40.3 kg/m2         Pyelonephritis  -     Ambulatory referral/consult to Urology; Future; Expected date: 04/09/2024    Bacteremia    Infection due to ESBL-producing Escherichia coli  -     Ambulatory referral/consult to Urology; Future; Expected date: 04/09/2024      This note was created using Dragon voice recognition software that occasionally misinterpreted phrases or words.

## 2024-04-03 ENCOUNTER — TELEPHONE (OUTPATIENT)
Dept: UROLOGY | Facility: CLINIC | Age: 59
End: 2024-04-03
Payer: COMMERCIAL

## 2024-04-08 ENCOUNTER — OFFICE VISIT (OUTPATIENT)
Dept: UROLOGY | Facility: CLINIC | Age: 59
End: 2024-04-08
Payer: COMMERCIAL

## 2024-04-08 DIAGNOSIS — Z16.12 INFECTION DUE TO ESBL-PRODUCING ESCHERICHIA COLI: ICD-10-CM

## 2024-04-08 DIAGNOSIS — A49.8 INFECTION DUE TO ESBL-PRODUCING ESCHERICHIA COLI: ICD-10-CM

## 2024-04-08 DIAGNOSIS — N20.0 NEPHROLITHIASIS: Primary | ICD-10-CM

## 2024-04-08 DIAGNOSIS — N12 PYELONEPHRITIS: ICD-10-CM

## 2024-04-08 LAB
BILIRUBIN, UA POC OHS: NEGATIVE
BLOOD, UA POC OHS: ABNORMAL
CLARITY, UA POC OHS: CLEAR
COLOR, UA POC OHS: YELLOW
GLUCOSE, UA POC OHS: NEGATIVE
KETONES, UA POC OHS: NEGATIVE
LEUKOCYTES, UA POC OHS: ABNORMAL
NITRITE, UA POC OHS: POSITIVE
PH, UA POC OHS: 5.5
PROTEIN, UA POC OHS: NEGATIVE
SPECIFIC GRAVITY, UA POC OHS: 1.02
UROBILINOGEN, UA POC OHS: 0.2

## 2024-04-08 PROCEDURE — 87077 CULTURE AEROBIC IDENTIFY: CPT | Performed by: NURSE PRACTITIONER

## 2024-04-08 PROCEDURE — 87088 URINE BACTERIA CULTURE: CPT | Performed by: NURSE PRACTITIONER

## 2024-04-08 PROCEDURE — 99999 PR PBB SHADOW E&M-EST. PATIENT-LVL III: CPT | Mod: PBBFAC,,, | Performed by: NURSE PRACTITIONER

## 2024-04-08 PROCEDURE — 87086 URINE CULTURE/COLONY COUNT: CPT | Performed by: NURSE PRACTITIONER

## 2024-04-08 PROCEDURE — 81003 URINALYSIS AUTO W/O SCOPE: CPT | Mod: QW,S$GLB,, | Performed by: NURSE PRACTITIONER

## 2024-04-08 PROCEDURE — 51701 INSERT BLADDER CATHETER: CPT | Mod: S$GLB,,, | Performed by: NURSE PRACTITIONER

## 2024-04-08 PROCEDURE — 87186 SC STD MICRODIL/AGAR DIL: CPT | Performed by: NURSE PRACTITIONER

## 2024-04-08 PROCEDURE — 99214 OFFICE O/P EST MOD 30 MIN: CPT | Mod: 25,S$GLB,, | Performed by: NURSE PRACTITIONER

## 2024-04-08 RX ORDER — FLUTICASONE PROPIONATE 50 MCG
1 SPRAY, SUSPENSION (ML) NASAL
COMMUNITY
End: 2024-04-08 | Stop reason: SDUPTHER

## 2024-04-08 NOTE — PROGRESS NOTES
[facility] Note  Staff: ALEJANDRINA Wills    PCP: N/A    Chief Complaint: Hospital F/UP-UTI/Sepsis    Subjective:        HPI: Yenifer Chatterjee is a 58 y.o. female NEW PT presents today in office for hospital f/up at this time.    Patient with past medical history of recurrent UTIs, has had 3 in the last 6 months, prior right kidney stones, had stent placement before.  History of sepsis in 2022 from complicated UTI/pyelonephritis.  She is known to Infectious Disease Dept from recent admission to [facility] for sepsis secondary to ESBL bacteremia in the setting of recurrent complicated UTIs.  As per CT scan there were no stones identified although kidney ultrasound revealed query 3 mm, unable to exclude stone.  She was discharged home on ertapenem via midline for 10 days which she completed on March 27th.  Weekly labs reviewed, normal white count, left shift down to 70%, CRP down to 40%.  Stable kidney function and liver function tests.     Patient is here for follow-up, has no acute complaints.  She denies any constitutional symptoms, no fever chills, nausea or vomiting, no flank pain, no dysuria increased urinary frequency.  She does note decreased urinary volume although reports not drinking enough water as she should.      Recent Imaging:  Renal/Bladder US done on 3/18/2024:  IMPRESSION:  Query a nonshadowing stone in the left kidney, 3 mm. Several simple left renal cysts.      CT Abdomen Pelvis With IV Contrast  3/16/2024:  IMPRESSION:  No acute findings in the abdomen    UA in office today is AZO stained.      REVIEW OF SYSTEMS:  A comprehensive 10 system review was performed and is negative except as noted above in HPI    PMHx:  Past Medical History:   Diagnosis Date    Kidney stone     Urinary tract infection        PSHx:  Past Surgical History:   Procedure Laterality Date    CYSTOSCOPY N/A 9/23/2022    Procedure: CYSTOSCOPY;  Surgeon: Woo Ramirez MD;  Location: [facility] OR [address] [place] [address];   Service: Urology;  Laterality: N/A;    CYSTOSCOPY W/ URETERAL STENT PLACEMENT Right 9/4/2022    Procedure: CYSTOSCOPY, WITH URETERAL STENT INSERTION;  Surgeon: Ree Pak MD;  [facility];  Service: Urology;  Laterality: Right;    LASER LITHOTRIPSY Right 9/23/2022    Procedure: LITHOTRIPSY, USING LASER;  Surgeon: Woo Ramirez MD;  [facility];  Service: Urology;  Laterality: Right;    PYELOSCOPY Right 9/23/2022    Procedure: PYELOSCOPY;  Surgeon: Woo Ramirez MD;  [facility];  Service: Urology;  Laterality: Right;    RETROGRADE PYELOGRAPHY Right 9/4/2022    Procedure: PYELOGRAM, RETROGRADE;  Surgeon: Ree Pak MD;  [facility];  Service: Urology;  Laterality: Right;    RETROGRADE PYELOGRAPHY Right 9/23/2022    Procedure: PYELOGRAM, RETROGRADE;  Surgeon: Woo Ramirez MD;  Location: [facility] OR [address] [place] [address];  Service: Urology;  Laterality: Right;    URETEROSCOPIC REMOVAL OF URETERIC CALCULUS Right 9/23/2022    Procedure: REMOVAL, CALCULUS, URETER, URETEROSCOPIC;  Surgeon: Woo Ramirez MD;  Location: [facility] OR [address] [place] [address];  Service: Urology;  Laterality: Right;    URETEROSCOPY Right 9/23/2022    Procedure: URETEROSCOPY;  Surgeon: Woo Ramirez MD;  Location: [facility] OR [address] [place] [address];  Service: Urology;  Laterality: Right;       Allergies:  Patient has no known allergies.    Medications: reviewed   Objective:   There were no vitals filed for this visit.    Physical Exam  Vitals reviewed.   Constitutional:       Appearance: She is well-developed.   HENT:      Head: Normocephalic and atraumatic.   Eyes:      Conjunctiva/sclera: Conjunctivae normal.      Pupils: Pupils are equal, round, and reactive to light.   Cardiovascular:      Rate and Rhythm: Normal rate and regular rhythm.      Heart sounds: Normal heart sounds.   Pulmonary:      Effort: Pulmonary effort is normal.      Breath sounds: Normal breath sounds.   Abdominal:      General: Bowel sounds are  normal.      Palpations: Abdomen is soft.   Musculoskeletal:         General: Normal range of motion.      Cervical back: Normal range of motion and neck supple.   Skin:     General: Skin is warm and dry.   Neurological:      Mental Status: She is alert and oriented to person, place, and time.      Deep Tendon Reflexes: Reflexes are normal and symmetric.   Psychiatric:         Behavior: Behavior normal.         Thought Content: Thought content normal.         Judgment: Judgment normal.     Procedure Note:  After betadine irrigation of the urethra, A #10 Russian straight tip catheter was inserted into the bladder with 20 mL of urine obtained.  It was found during cath process--+Mild to moderate prolapse visualized within vaginal vault.  Pt will f/up with her GYN regarding this.    Assessment:       1. Nephrolithiasis    2. Pyelonephritis    3. Infection due to ESBL-producing Escherichia coli          Plan:   Questionable Pyelo during hospitalization vs. Other origin of sepsis due to imaging of  system being WNL at that time???    Urine Culture to be processed from cath'd specimen at this time.  It has been confirmed today that pt is emptying her bladder per examination.    F/u in one month with repeat UA at that time.    DETAILED PLAN: .   Preventative DAILY supplements:  Recommend Buying a Cranberry Supplement that has 36mg PAC (only a few have this much) of cranberry - it is a very specific dose but many companies sell it.   Preferred: Utiva Cranberry Tablets (Utiva Cranberry PACs Supplement Pills $39.99 for 30 pills)- their particular tablet is the equivalent of 9 cranberry tablets that you buy over the counter. (phone 1-275.989.7048 or online https://www.Xagenic/products/utiva-uti-control)  Uriexo Cranberry Tablets   Yuliana  [address] $30/month: https://Storactive/products/cranberex-urinary-health-support  If unable to afford- can use over the counter cranberry caplets but will need to take  1500mg daily (about 3 capsules, 3x a day)   Ok to Buy Over the Counter at [facility] (ask pharmacist for help) or buy from [employer] (see above)  Probiotic with lactobacillus - take once a day. This helps populate the vagina with good bacteria instead of bad bacteria- you can buy this over the counter or buy from the company [employer]. Make sure to look for LACTOBACILLUS on the bottle.   D-mannose supplement (2000mg a day)  $20/30d supply  This helps keep the bad bacteria from sticking to your bladder wall. You can buy this over the counter or buy from [employer] by visiting [employer].     UTI prevention recommendations  Drink more water (2 to 4 bottles) to dilute the bacteria that are replicating. This will also help you urinate more often if you tend to hold your bladder.   Timed voiding (which means- Urinate every 2 hours during the day) to rid the bladder of bacteria before they multiply and start to stick to your bladder walls and cause more symptoms and problems  Avoid pads if possible or wear thinner pads and change them more often    Other helpful information:    If you have a UTI try to self treat first for 1-2 days with conservative treatment:  Increase fluid intake - drink 4 to 5 bottles of water a day and empty bladder often  AZO for burning or urinary frequency (over the counter)  Utiva cranberry tablets when having uti symptoms: Take 2x a day for 2 days then daily for a week (buy from [employer]). Visit https://www.[employer]one.Doubles Alley or call 1-699.823.4418 to order. They costs about $30/month and offer a subscribe and save option. They also have a probiotic and D mannose supplementation, if the patient is able to afford, I suggest taking. Utiva cranberry tablets only available from [employer] are equivalent to the suggested dose of 9 tablets if you buy over the counter.   D mannose 2000mx a day for 2 days then daily for a week (can buy from [employer] or over the counter)    If  "your symptoms persists despite increased water intake and azo then:  see pcp, gynecologist, or urgent care and make sure to give a clean catch urine or catheterized urine. This means wipe, urinate in the toilet, then provide a MID STREAM sample (your hand should get urine on it if you are doing it right). This avoids urine picking up the normal bacteria that line the vagina on the way out to the cup.   ask for a URINE CULTURE. You need to make sure you know what antibiotics will kill your particular bacteria  if you are told you have "multiple organisms" or "normal vaginal oma" it means the urine sample picked up the normal bacteria in the vagina and contaminated your urine specimen. If you are still having symptoms of a UTI then you will need to provide ANOTHER clean catch sample or CATHETERIZED urine to bypass the vagina and get urine directly from the bladder:     If you are given antibiotics from primary care, ER, urgent care or gynecologist:  only take 3 to 5 days of the antibiotics (unless you have diabetes or a urologist tells you take take more), even if they give you a 10d supply and keep or discard the rest  only take the full 10 days if you are having fever, chills or pain in your kidneys     Things to remember:   Try to avoid antibiotics or at least try to avoid taking them for more than 3 to 5 days for simple uti.    Bacteria are smart and they will become resistant to antibiotics. We have only a limited amount of antibiotics that work.   ALWAYS request a urine culture so you can know which antibiotics work.   If you ever see bloody red urine call us ASAP, even with uti's and even if you are on a blood thinner, this can sometimes be a sign of bladder cancer or kidney stones.     If you do have uti symptoms but do not have a culture proven uti (with E.coli, for example)  You may need a catheterized urine if it says "multiple organisms" which means normal vaginal oma  You may have a kidney stone, " interstitial cystitis, overactive bladder, bladder cancer, so please call to make a follow-up     Once we rule out any anatomic obstruction and have given UTI recs, we will see for follow-up and then will have you return to your PCP/primary care physician  for symptomatic UTI treatment wit the information and recommendations we have given them.       [facility]: Active    Stefani Jimenez, FNP-C

## 2024-04-09 ENCOUNTER — DOCUMENT SCAN (OUTPATIENT)
Dept: HOME HEALTH SERVICES | Facility: HOSPITAL | Age: 59
End: 2024-04-09
Payer: COMMERCIAL

## 2024-04-10 LAB — BACTERIA UR CULT: ABNORMAL

## 2024-04-11 ENCOUNTER — TELEPHONE (OUTPATIENT)
Dept: UROLOGY | Facility: CLINIC | Age: 59
End: 2024-04-11
Payer: COMMERCIAL

## 2024-04-11 DIAGNOSIS — N12 PYELONEPHRITIS: Primary | ICD-10-CM

## 2024-04-11 RX ORDER — FLUCONAZOLE 150 MG/1
150 TABLET ORAL DAILY
Qty: 3 TABLET | Refills: 0 | Status: SHIPPED | OUTPATIENT
Start: 2024-04-11 | End: 2024-04-14

## 2024-04-11 RX ORDER — SULFAMETHOXAZOLE AND TRIMETHOPRIM 800; 160 MG/1; MG/1
1 TABLET ORAL 2 TIMES DAILY
Qty: 28 TABLET | Refills: 0 | Status: SHIPPED | OUTPATIENT
Start: 2024-04-11 | End: 2024-04-25

## 2024-04-11 NOTE — TELEPHONE ENCOUNTER
----- Message from Moses Chatterjee sent at 4/11/2024 10:44 AM CDT -----  Regarding: Message  Name of Who is Calling:  Patient          What is the request in detail:  Patient stated she received a call from Dr. Ambriz nurse, I don't see a message. Please call patient            Can the clinic reply by [facility]:             What Number to Call Back if not in [facility]: [phone]

## 2024-04-15 ENCOUNTER — PATIENT MESSAGE (OUTPATIENT)
Dept: GASTROENTEROLOGY | Facility: CLINIC | Age: 59
End: 2024-04-15
Payer: COMMERCIAL

## 2024-04-15 ENCOUNTER — OFFICE VISIT (OUTPATIENT)
Dept: OBSTETRICS AND GYNECOLOGY | Facility: CLINIC | Age: 59
End: 2024-04-15
Payer: COMMERCIAL

## 2024-04-15 VITALS
BODY MASS INDEX: 38.91 KG/M2 | DIASTOLIC BLOOD PRESSURE: 68 MMHG | WEIGHT: 227.94 LBS | HEIGHT: 64 IN | SYSTOLIC BLOOD PRESSURE: 118 MMHG

## 2024-04-15 DIAGNOSIS — Z12.11 SCREENING FOR COLON CANCER: ICD-10-CM

## 2024-04-15 DIAGNOSIS — Z01.419 WELL WOMAN EXAM WITH ROUTINE GYNECOLOGICAL EXAM: Primary | ICD-10-CM

## 2024-04-15 DIAGNOSIS — Z12.31 ENCOUNTER FOR SCREENING MAMMOGRAM FOR MALIGNANT NEOPLASM OF BREAST: ICD-10-CM

## 2024-04-15 DIAGNOSIS — Z87.440 HISTORY OF CHRONIC URINARY TRACT INFECTION: ICD-10-CM

## 2024-04-15 DIAGNOSIS — Z12.4 SCREENING FOR MALIGNANT NEOPLASM OF CERVIX: ICD-10-CM

## 2024-04-15 DIAGNOSIS — N95.2 ATROPHIC VAGINITIS: ICD-10-CM

## 2024-04-15 DIAGNOSIS — R10.2 PELVIC PAIN: ICD-10-CM

## 2024-04-15 DIAGNOSIS — N81.2 CYSTOCELE WITH INCOMPLETE UTEROVAGINAL PROLAPSE: ICD-10-CM

## 2024-04-15 PROCEDURE — 99386 PREV VISIT NEW AGE 40-64: CPT | Mod: 25,S$GLB,, | Performed by: OBSTETRICS & GYNECOLOGY

## 2024-04-15 PROCEDURE — 87086 URINE CULTURE/COLONY COUNT: CPT | Performed by: OBSTETRICS & GYNECOLOGY

## 2024-04-15 PROCEDURE — 99999 PR PBB SHADOW E&M-EST. PATIENT-LVL III: CPT | Mod: PBBFAC,,, | Performed by: OBSTETRICS & GYNECOLOGY

## 2024-04-15 PROCEDURE — 87624 HPV HI-RISK TYP POOLED RSLT: CPT | Performed by: OBSTETRICS & GYNECOLOGY

## 2024-04-15 PROCEDURE — 81003 URINALYSIS AUTO W/O SCOPE: CPT | Mod: QW,S$GLB,, | Performed by: OBSTETRICS & GYNECOLOGY

## 2024-04-15 PROCEDURE — 88175 CYTOPATH C/V AUTO FLUID REDO: CPT | Performed by: OBSTETRICS & GYNECOLOGY

## 2024-04-15 RX ORDER — ESTRADIOL 10 UG/1
10 INSERT VAGINAL
Qty: 24 TABLET | Refills: 3 | Status: SHIPPED | OUTPATIENT
Start: 2024-04-15 | End: 2024-05-29 | Stop reason: SDUPTHER

## 2024-04-15 NOTE — PROGRESS NOTES
Chief Complaint   Patient presents with    Annual Exam       History and Physical:  No LMP recorded. Patient is postmenopausal.    HRT: None    Date: 4/15/2024    Yenifer Chatterjee is a 58 y.o.  who presents today for her routine annual GYN exam.     Gynecologic issues:   The patient denies any postmenopausal bleeding.    For some time now she has had issues with pelvic pressure and right lower quadrant pain  She is under the care of both Infectious Disease and Urology due to recurrent urinary tract infections and hospitalization for sepsis x2.  She was recently told that she may have some pelvic relaxation.    Mammogram:  Never  Colon cancer screening:  Many years    Family history:  Breast cancer:  Negative    Colon cancer:  Negative   Gyn related cancer:  Negative     Allergies: Review of patient's allergies indicates:  No Known Allergies    Past Medical History:   Diagnosis Date    Kidney stone     Urinary tract infection        Past Surgical History:   Procedure Laterality Date     SECTION      CYSTOSCOPY N/A 2022    Procedure: CYSTOSCOPY;  Surgeon: Woo Ramirez MD;  Location: [address];  Service: Urology;  Laterality: N/A;    CYSTOSCOPY W/ URETERAL STENT PLACEMENT Right 2022    Procedure: CYSTOSCOPY, WITH URETERAL STENT INSERTION;  Surgeon: Ree Pak MD;  Location: [address];  Service: Urology;  Laterality: Right;    LASER LITHOTRIPSY Right 2022    Procedure: LITHOTRIPSY, USING LASER;  Surgeon: Woo Ramirez MD;  Location: [address];  Service: Urology;  Laterality: Right;    PYELOSCOPY Right 2022    Procedure: PYELOSCOPY;  Surgeon: Woo Ramirez MD;  Location: [address];  Service: Urology;  Laterality: Right;    RETROGRADE PYELOGRAPHY Right 2022    Procedure: PYELOGRAM, RETROGRADE;  Surgeon: Ree Pak MD;  Location: [address];  Service: Urology;  Laterality: Right;    RETROGRADE PYELOGRAPHY Right 2022     Procedure: PYELOGRAM, RETROGRADE;  Surgeon: Woo Ramirez MD;  Location: [address];  Service: Urology;  Laterality: Right;    URETEROSCOPIC REMOVAL OF URETERIC CALCULUS Right 2022    Procedure: REMOVAL, CALCULUS, URETER, URETEROSCOPIC;  Surgeon: Woo Ramirez MD;  Location: [address];  Service: Urology;  Laterality: Right;    URETEROSCOPY Right 2022    Procedure: URETEROSCOPY;  Surgeon: Woo Ramirez MD;  Location: [address];  Service: Urology;  Laterality: Right;       MEDS:   Current Outpatient Medications:     sulfamethoxazole-trimethoprim 800-160mg (BACTRIM DS) 800-160 mg Tab, Take 1 tablet by mouth 2 (two) times daily. for 14 days, Disp: 28 tablet, Rfl: 0    estradioL (VAGIFEM) 10 mcg Tab, Place 1 tablet (10 mcg total) vaginally twice a week. Start with one tablet per vagina q.h.s. x7 and then one tablet twice weekly, Disp: 24 tablet, Rfl: 3    fluticasone propionate (FLONASE) 50 mcg/actuation nasal spray, 2 sprays by Each Nostril route once daily. (Patient not taking: Reported on 4/15/2024), Disp: , Rfl:     ondansetron (ZOFRAN-ODT) 4 MG TbDL, Take 1 tablet (4 mg total) by mouth every 6 (six) hours as needed. (Patient not taking: Reported on 4/15/2024), Disp: 15 tablet, Rfl: 0     OB History          1    Para   1    Term   1            AB        Living   1         SAB        IAB        Ectopic        Multiple        Live Births               Obstetric Comments    section x 1               Social History     Tobacco Use    Smoking status: Never    Smokeless tobacco: Never   Substance Use Topics    Alcohol use: No    Drug use: No       Family History   Problem Relation Name Age of Onset    Diabetes Mother      Hypertension Mother      Diabetes Father      Hypertension Father         Past medical and surgical history reviewed.   I have reviewed the patient's medical history in detail and updated the computerized patient record.    Review of Systems (at  "today's evaluation)  Review of Systems   Constitutional:  Negative for fever and unexpected weight change.   HENT:  Negative for congestion, ear pain, nosebleeds, sinus pain and sore throat.    Eyes: Negative.    Respiratory:  Negative for cough and shortness of breath.    Cardiovascular:  Negative for chest pain and palpitations.   Gastrointestinal:  Negative for constipation, diarrhea, nausea and vomiting.   Endocrine: Negative.    Genitourinary:  Negative for urgency.         & Gyn as per HPI   Musculoskeletal:  Negative for arthralgias and myalgias.   Skin:  Negative for rash.   Neurological:  Negative for weakness and headaches.   Psychiatric/Behavioral:  The patient is not nervous/anxious.         Physical Exam:   /68 (BP Location: Right arm, Patient Position: Sitting, BP Method: Medium (Manual))   Ht 5' 4" (1.626 m)   Wt 103.4 kg (227 lb 15.3 oz)   BMI 39.13 kg/m²     Physical Exam:   Constitutional: She appears well-developed and well-nourished.    HENT:   Head: Normocephalic.     Neck: No thyroid mass present.    Cardiovascular:  Normal rate.             Pulmonary/Chest: Effort normal. Right breast exhibits no mass, no nipple discharge and no skin change. Left breast exhibits no mass, no nipple discharge and no skin change.        Abdominal: Soft. There is no abdominal tenderness.     Genitourinary:    Inguinal canal, uterus, right adnexa and left adnexa normal.      Pelvic exam was performed with patient supine.   The external female genitalia was normal.   No external genitalia lesions identified,Cervix is normal. Right adnexum displays no mass and no tenderness. Left adnexum displays no mass and no tenderness. There is erythema and cystocele in the vagina. No tenderness or bleeding in the vagina. Vaginal atrophy noted. Uterus is uterine prolapse. Uterus is not tender. Normal urethral meatus.Urethra findings: no tendernessBladder findings: no bladder tenderness   Genitourinary Comments: " Chaperone (female medical assistant) present throughout physical exam.    On evaluation of the pelvis significant atrophic changes are noted.  In addition a grade 2 cystocele with associated mid vagina descent of the uterus and cervix was noted.               Musculoskeletal:      Right lower leg: No edema.      Left lower leg: No edema.      Lymphadenopathy:     She has no cervical adenopathy. No inguinal adenopathy noted on the right or left side.    Neurological: She is alert.   No gross defects noted    Skin: Skin is warm and dry.    Psychiatric: Mood normal.      Office urinalysis on 4/15/2024  Negative:  Glucose / Bilirubin / Ketones / Nitrites / Leukocyte esterase  POSITIVE: BLOOD small / PROTEIN trace    Assessment:        1. Well woman exam with routine gynecological exam    2. Encounter for screening mammogram for malignant neoplasm of breast    3. Screening for colon cancer    4. Screening for malignant neoplasm of cervix    5. Atrophic vaginitis    6. History of chronic urinary tract infection    7. Cystocele with incomplete uterovaginal prolapse    8. Pelvic pain         Plan:      Well woman exam with routine gynecological exam    Encounter for screening mammogram for malignant neoplasm of breast  -     Mammo Digital Screening Bilat w/ Curt; Future; Expected date: 04/15/2024    Screening for colon cancer  -     Case Request Endoscopy: COLONOSCOPY    Screening for malignant neoplasm of cervix  -     HPV High Risk Genotypes, PCR  -     Liquid-Based Pap Smear, Screening    Atrophic vaginitis  -     estradioL (VAGIFEM) 10 mcg Tab; Place 1 tablet (10 mcg total) vaginally twice a week. Start with one tablet per vagina q.h.s. x7 and then one tablet twice weekly  Dispense: 24 tablet; Refill: 3    History of chronic urinary tract infection  -     POCT Urinalysis(Instrument)  -     Urine culture    Cystocele with incomplete uterovaginal prolapse    Pelvic pain  -     US Pelvis Comp with Transvag NON-OB (xpd;  Future; Expected date: 04/15/2024       Follow up for ASAP after recommended testing obtained, Follow-up on today's evaluation.     The above was reviewed and discussed with the patient.    Annual exam and screening issues based on the patient's age, medical history and family history were reviewed / discussed.  Routine health maintenance issues were reviewed and discussed.      In light of the patient's right lower quadrant pain colonoscopy has been ordered as well as a pelvic ultrasound to evaluate the pelvis primarily the right adnexa.    We discussed the patient's history of UTI and urosepsis.  Findings on her urinalysis today were discussed and urine will be sent for culture and sensitivity.    We discussed the role of estrogen in the treatment of atrophic vaginitis as well as the potential association of atrophic vaginitis with urinary tract issues including urinary tract infections.      At this time we will initiate therapy with vaginal estrogen as above.  The pros, cons, risks, benefits, alternatives and indications of the medication(s) prescribed, as well as appropriate use and potential side effects were discussed.    We will face further evaluation and treatment on results of the above and the patient's response to vaginal estrogen.  Following estrogen treatment we discussed conservative treatment, pessary and potential surgical intervention.    The patient's questions were answered, and she is in agreement with the current plan.     Chito Martinez MD  Department OBGYN  [facility]

## 2024-04-16 LAB
BACTERIA UR CULT: NORMAL
BILIRUBIN, UA POC OHS: NEGATIVE
BLOOD, UA POC OHS: ABNORMAL
CLARITY, UA POC OHS: CLEAR
COLOR, UA POC OHS: YELLOW
GLUCOSE, UA POC OHS: NEGATIVE
KETONES, UA POC OHS: NEGATIVE
LEUKOCYTES, UA POC OHS: NEGATIVE
NITRITE, UA POC OHS: NEGATIVE
PH, UA POC OHS: 5.5
PROTEIN, UA POC OHS: ABNORMAL
SPECIFIC GRAVITY, UA POC OHS: >=1.03
UROBILINOGEN, UA POC OHS: 0.2

## 2024-04-17 ENCOUNTER — HOSPITAL ENCOUNTER (OUTPATIENT)
Dept: RADIOLOGY | Facility: HOSPITAL | Age: 59
Discharge: HOME | End: 2024-04-17
Attending: OBSTETRICS & GYNECOLOGY
Payer: COMMERCIAL

## 2024-04-17 ENCOUNTER — HOSPITAL ENCOUNTER (OUTPATIENT)
Dept: OBSTETRICS AND GYNECOLOGY | Facility: CLINIC | Age: 59
Discharge: HOME | End: 2024-04-17
Attending: OBSTETRICS & GYNECOLOGY
Payer: COMMERCIAL

## 2024-04-17 DIAGNOSIS — R10.2 PELVIC PAIN: ICD-10-CM

## 2024-04-17 DIAGNOSIS — Z12.31 ENCOUNTER FOR SCREENING MAMMOGRAM FOR MALIGNANT NEOPLASM OF BREAST: ICD-10-CM

## 2024-04-17 PROCEDURE — 77067 SCR MAMMO BI INCL CAD: CPT | Mod: 26,,, | Performed by: RADIOLOGY

## 2024-04-17 PROCEDURE — 77067 SCR MAMMO BI INCL CAD: CPT | Mod: TC

## 2024-04-17 PROCEDURE — 77063 BREAST TOMOSYNTHESIS BI: CPT | Mod: 26,,, | Performed by: RADIOLOGY

## 2024-04-25 ENCOUNTER — OFFICE VISIT (OUTPATIENT)
Dept: OBSTETRICS AND GYNECOLOGY | Facility: CLINIC | Age: 59
End: 2024-04-25
Payer: COMMERCIAL

## 2024-04-25 VITALS
DIASTOLIC BLOOD PRESSURE: 60 MMHG | HEIGHT: 64 IN | WEIGHT: 229.06 LBS | HEART RATE: 75 BPM | SYSTOLIC BLOOD PRESSURE: 116 MMHG | BODY MASS INDEX: 39.11 KG/M2

## 2024-04-25 DIAGNOSIS — N81.2 CYSTOCELE WITH INCOMPLETE UTEROVAGINAL PROLAPSE: ICD-10-CM

## 2024-04-25 DIAGNOSIS — Z87.19 HISTORY OF DIVERTICULITIS: ICD-10-CM

## 2024-04-25 DIAGNOSIS — R35.0 URINARY FREQUENCY: ICD-10-CM

## 2024-04-25 DIAGNOSIS — R10.2 PELVIC PAIN: ICD-10-CM

## 2024-04-25 DIAGNOSIS — Z87.440 HISTORY OF CHRONIC URINARY TRACT INFECTION: Primary | ICD-10-CM

## 2024-04-25 PROCEDURE — 99999 PR PBB SHADOW E&M-EST. PATIENT-LVL IV: CPT | Mod: PBBFAC,,, | Performed by: OBSTETRICS & GYNECOLOGY

## 2024-04-25 PROCEDURE — 99214 OFFICE O/P EST MOD 30 MIN: CPT | Mod: S$GLB,,, | Performed by: OBSTETRICS & GYNECOLOGY

## 2024-04-25 NOTE — PROGRESS NOTES
Chief Complaint   Patient presents with    Follow-up     U/S results       History and Physical:  No LMP recorded. Patient is postmenopausal.    HRT: None    Date: 4/15/2024    Yenifer Chatterjee is a 58 y.o.  who presents today for her routine annual GYN exam.     Gynecologic issues:   The patient denies any postmenopausal bleeding.    For some time now she has had issues with pelvic pressure and right lower quadrant pain  She is under the care of both Infectious Disease and Urology due to recurrent urinary tract infections and hospitalization for sepsis x2.  She was recently told that she may have some pelvic relaxation.    Mammogram:  Never  Colon cancer screening:  Many years    Family history:  Breast cancer:  Negative    Colon cancer:  Negative   Gyn related cancer:  Negative     Date: 2024    The patient presents today for follow-up.  She was last seen as above.  In light of her persistent pain which she now states is primarily on the left pelvic ultrasound was ordered.    The patient  present today to discuss the results.    The patient now reports a history of diverticulitis with hospitalization a few years ago.    She is scheduled for a screening colonoscopy in July.    She also reports longstanding issues with urinary frequency and nocturia.  No hematuria or dysuria.    Allergies: Review of patient's allergies indicates:  No Known Allergies    Past Medical History:   Diagnosis Date    History of diverticulitis 2024    Kidney stone     Urinary tract infection        Past Surgical History:   Procedure Laterality Date     SECTION      x 1    CYSTOSCOPY N/A 2022    Procedure: CYSTOSCOPY;  Surgeon: Woo Ramirez MD;  Location: [facility] OR [address] [place] [address];  Service: Urology;  Laterality: N/A;    CYSTOSCOPY W/ URETERAL STENT PLACEMENT Right 2022    Procedure: CYSTOSCOPY, WITH URETERAL STENT INSERTION;  Surgeon: Ree Pak MD;  Location: [facility] OR [address] [place] [address];  Service: Urology;   Laterality: Right;    LASER LITHOTRIPSY Right 2022    Procedure: LITHOTRIPSY, USING LASER;  Surgeon: Woo Ramirez MD;  [facility];  Service: Urology;  Laterality: Right;    PYELOSCOPY Right 2022    Procedure: PYELOSCOPY;  Surgeon: Woo Ramirez MD;  [facility];  Service: Urology;  Laterality: Right;    RETROGRADE PYELOGRAPHY Right 2022    Procedure: PYELOGRAM, RETROGRADE;  Surgeon: Ree Pak MD;  [facility];  Service: Urology;  Laterality: Right;    RETROGRADE PYELOGRAPHY Right 2022    Procedure: PYELOGRAM, RETROGRADE;  Surgeon: Woo Ramirez MD;  [facility];  Service: Urology;  Laterality: Right;    URETEROSCOPIC REMOVAL OF URETERIC CALCULUS Right 2022    Procedure: REMOVAL, CALCULUS, URETER, URETEROSCOPIC;  Surgeon: Woo Ramirez MD;  [facility];  Service: Urology;  Laterality: Right;    URETEROSCOPY Right 2022    Procedure: URETEROSCOPY;  Surgeon: Woo Ramirez MD;  Location: [facility] OR [address] [place] [address];  Service: Urology;  Laterality: Right;       MEDS:   Current Outpatient Medications:     estradioL (VAGIFEM) 10 mcg Tab, Place 1 tablet (10 mcg total) vaginally twice a week. Start with one tablet per vagina q.h.s. x7 and then one tablet twice weekly, Disp: 24 tablet, Rfl: 3    fluticasone propionate (FLONASE) 50 mcg/actuation nasal spray, 2 sprays by Each Nostril route once daily. (Patient not taking: Reported on 4/15/2024), Disp: , Rfl:     ondansetron (ZOFRAN-ODT) 4 MG TbDL, Take 1 tablet (4 mg total) by mouth every 6 (six) hours as needed. (Patient not taking: Reported on 4/15/2024), Disp: 15 tablet, Rfl: 0    sulfamethoxazole-trimethoprim 800-160mg (BACTRIM DS) 800-160 mg Tab, Take 1 tablet by mouth 2 (two) times daily. for 14 days (Patient not taking: Reported on 2024), Disp: 28 tablet, Rfl: 0     OB History          2    Para   2    Term   2            AB         "Living   1         SAB        IAB        Ectopic        Multiple        Live Births   2           Obstetric Comments    section x 1               Social History     Tobacco Use    Smoking status: Never    Smokeless tobacco: Never   Substance Use Topics    Alcohol use: No    Drug use: No       Family History   Problem Relation Name Age of Onset    Diabetes Mother      Hypertension Mother      Diabetes Father      Hypertension Father         Past medical and surgical history reviewed.   I have reviewed the patient's medical history in detail and updated the computerized patient record.    Review of Systems (at today's evaluation)  Review of Systems   Constitutional:  Negative for fever and unexpected weight change.   Respiratory:  Negative for cough and shortness of breath.    Cardiovascular:  Negative for chest pain.   Gastrointestinal:  Positive for abdominal pain. Negative for constipation, diarrhea and nausea.   Genitourinary:  Positive for frequency. Negative for dysuria.          GYN AS PER HPI   Musculoskeletal: Negative.    Skin: Negative.    Neurological: Negative.         Physical Exam:   /60 (BP Location: Right arm, Patient Position: Sitting, BP Method: Medium (Automatic))   Pulse 75   Ht 5' 4" (1.626 m)   Wt 103.9 kg (229 lb 0.9 oz)   BMI 39.32 kg/m²     Physical Exam:   Constitutional: She appears well-developed and well-nourished.    HENT:   Head: Normocephalic.     Neck: No thyroid mass present.    Cardiovascular:  Normal rate.             Pulmonary/Chest: Effort normal. No respiratory distress.        Abdominal: Soft. There is no abdominal tenderness.             Musculoskeletal: Normal range of motion.      Right lower leg: No edema.      Left lower leg: No edema.       Neurological: She is alert.   No gross lesions noted.    Skin: Skin is warm and dry.    Psychiatric: She has a normal mood and affect. Her speech is normal and behavior is normal. Mood normal.      Recent Laboratory " Results:    Pap smear from 4/15/2024 noted no cervical cell abnormalities and was HPV negative        Office urinalysis on 4/15/2024  Negative:  Glucose / Bilirubin / Ketones / Nitrites / Leukocyte esterase  POSITIVE: BLOOD small / PROTEIN trace    Urine culture 04/15/2024: No growth      Recent Radiology Results:      3/16/2024 STAT     Narrative & Impression  EXAMINATION:  CT ABDOMEN PELVIS WITH IV CONTRAST     CLINICAL HISTORY:  Nausea/vomiting;Sepsis;    FINDINGS:  Lung bases are clear.  Normal sized heart.  Unremarkable gallbladder.     There are a few simple cysts along the left kidney cortex and pelvis.  Fatty atrophy along the proximal pancreas.  Remaining solid abdominal organs are unremarkable.     There is no enteric contrast which limits bowel assessment.  No dilated bowel loops.  There are a few scattered colonic diverticula.  Normal appendix.     Small fat containing umbilical defect.  Atrophied uterus and decompressed urinary bladder.     Mild multilevel spinal degenerative change.  Severe left hip degenerative change.     Impression:     No acute findings in the abdomen.    4/17/2024 Routine     Narrative & Impression  EXAMINATION:  US PELVIS COMP WITH TRANSVAG NON-OB (XPD)     CLINICAL HISTORY:  Pelvic and perineal pain    FINDINGS:  Uterus:     Size: 7.3 x 4.3 x 5.2 cm     Masses: No uterine mass.  Anterior wall Caesarean section defect.     Endometrium: Normal in this post menopausal patient, measuring 4 mm.     Right ovary:     Right ovary not visualized with bowel gas shadowing in the adnexa.     Left ovary:     Size: 2 x 1.5 x 2 cm     Appearance: Normal     Vascular Flow: Normal.     Free Fluid:     Trace     Impression:     Unremarkable uterus and left ovary.  Nonvisualized right ovary.      Assessment:        1. History of chronic urinary tract infection    2. Cystocele with incomplete uterovaginal prolapse    3. Pelvic pain    4. History of diverticulitis    5. Urinary frequency      Plan:       History of chronic urinary tract infection    Cystocele with incomplete uterovaginal prolapse    Pelvic pain  -     Ambulatory referral/consult to Gastroenterology; Future; Expected date: 05/02/2024    History of diverticulitis  -     Ambulatory referral/consult to Gastroenterology; Future; Expected date: 05/02/2024    Urinary frequency  -     Ambulatory referral/consult to Urogynecology; Future; Expected date: 05/02/2024       Follow up for Follow-up on today's evaluation after being seen and evaluated by GI and Urogynecology.     The above was reviewed and discussed with the patient and her significant other.      We discussed the reassuring results of her pelvic ultrasound results on previous CT scan.  Given the fact that the patient's pain is primarily in the left lower quadrant and she has a history of diverticulitis rather than proceed with screening colonoscopy I have recommended evaluation by GI with possible diagnostic colonoscopy in the near future.    We discussed her cystocele with issues regarding urinary frequency.  The complex nature of urinary frequency was discussed and prior to any surgical intervention for her cystocele I have recommended she be seen and evaluated by Urogynecology.    We reviewed results of her urine culture and urinalysis.    The patient her significant other's questions regarding the above were answered and they are in agreement with the current plan.    Chito Martinez MD  Department OBN  [facility]

## 2024-04-25 NOTE — Clinical Note
Please review my note from 04/25/2024.  This patient is scheduled to be seen by both of you in the near future.  She has a number of issues including left lower quadrant pain with history of diverticulitis as well as cystocele and urinary frequency.  I appreciate your input.  Thanks.  SP

## 2024-04-26 ENCOUNTER — OFFICE VISIT (OUTPATIENT)
Dept: GASTROENTEROLOGY | Facility: CLINIC | Age: 59
End: 2024-04-26
Payer: COMMERCIAL

## 2024-04-26 VITALS
WEIGHT: 228.38 LBS | SYSTOLIC BLOOD PRESSURE: 117 MMHG | HEIGHT: 64 IN | DIASTOLIC BLOOD PRESSURE: 60 MMHG | BODY MASS INDEX: 38.99 KG/M2 | HEART RATE: 75 BPM

## 2024-04-26 DIAGNOSIS — N39.0 CHRONIC UTI: ICD-10-CM

## 2024-04-26 DIAGNOSIS — K21.9 GASTROESOPHAGEAL REFLUX DISEASE, UNSPECIFIED WHETHER ESOPHAGITIS PRESENT: ICD-10-CM

## 2024-04-26 DIAGNOSIS — Z87.19 HISTORY OF HEMORRHOIDS: ICD-10-CM

## 2024-04-26 DIAGNOSIS — R12 HEARTBURN: ICD-10-CM

## 2024-04-26 DIAGNOSIS — R10.2 PELVIC PAIN: ICD-10-CM

## 2024-04-26 DIAGNOSIS — R10.32 LLQ PAIN: Primary | ICD-10-CM

## 2024-04-26 DIAGNOSIS — Z87.19 HISTORY OF DIVERTICULITIS: ICD-10-CM

## 2024-04-26 DIAGNOSIS — Z87.19 HISTORY OF RECTAL BLEEDING: ICD-10-CM

## 2024-04-26 PROCEDURE — 99999 PR PBB SHADOW E&M-EST. PATIENT-LVL IV: CPT | Mod: PBBFAC,,,

## 2024-04-26 PROCEDURE — 99203 OFFICE O/P NEW LOW 30 MIN: CPT | Mod: S$GLB,,,

## 2024-04-26 RX ORDER — DICYCLOMINE HYDROCHLORIDE 10 MG/1
10 CAPSULE ORAL 2 TIMES DAILY PRN
Qty: 120 CAPSULE | Refills: 0 | Status: SHIPPED | OUTPATIENT
Start: 2024-04-26 | End: 2024-06-25

## 2024-04-26 RX ORDER — PANTOPRAZOLE SODIUM 40 MG/1
40 TABLET, DELAYED RELEASE ORAL DAILY
Qty: 90 TABLET | Refills: 3 | Status: SHIPPED | OUTPATIENT
Start: 2024-04-26 | End: 2025-04-26

## 2024-04-26 RX ORDER — IBUPROFEN 200 MG
200 TABLET ORAL EVERY 6 HOURS PRN
COMMUNITY

## 2024-04-26 NOTE — PROGRESS NOTES
Subjective:       Patient ID: Yenifer Chatterjee is a 58 y.o. female Body mass index is 39.2 kg/m².    Chief Complaint: Abdominal Pain    This patient is new to me.  Referring Provider: Dr. Chito Martinez for LLQ pain.             GI Problem  The primary symptoms include abdominal pain (chronic LLQ pain, hx of diverticulitis states has only had one flair up about 15 years ago) and hematochezia (hx of hemorrhoids, intermittent BRBPR streaks on tissue not in the stool or in toilet, last episode about 6 weeks ago, no rectal pain). Primary symptoms do not include fever, weight loss, fatigue, nausea, vomiting, diarrhea, melena, hematemesis, jaundice, myalgias or arthralgias.   Onset: daily for about a year now, worse in the morning, movement also worsens pain. Progression: crampy pain. The abdominal pain is located in the LLQ. Pain radiation: radiates to lower pelvic area. The severity of the abdominal pain is 7/10 (CT of abdomen done on 03/2024 normal). Relieved by: advil helps, reports taking advil daily for years now for joint pains states it will help midly for abdominal pain.   The illness does not include dysphagia or constipation (Denies hx of constipation. Has a bm daily up to 3x/day or can go a day without a BM, rates stool type 4-5 on bristol stool scale, strains occasionally, feels empty). Associated symptoms comments: -patient referred by OBGYN for further investigation of left lower quadrant pain, For some time now she has had issues with pelvic pressure and left lower quadrant pain, She is under the care of both Infectious Disease and Urology due to recurrent urinary tract infections and hospitalization for sepsis x2.  Last hospitalization was on 03/16/2024, She was recently told that she may have some pelvic relaxation.  -Patient states her last colonoscopy was about 10 years ago [facility] denies any history of colon polyps denies any family history of colon cancer she was scheduled for a screening  colonoscopy in July 2024  . Significant associated medical issues include GERD and diverticulitis.   Heartburn  She complains of abdominal pain (chronic LLQ pain, hx of diverticulitis states has only had one flair up about 15 years ago) and heartburn. She reports no belching, no chest pain, no choking, no coughing, no dysphagia, no early satiety, no globus sensation, no hoarse voice, no nausea or no water brash. This is a chronic problem. The problem occurs occasionally. The problem has been waxing and waning. The heartburn is located in the substernum. The heartburn does not wake her from sleep. The heartburn does not limit her activity. The heartburn doesn't change with position. The symptoms are aggravated by certain foods (fried foods). Pertinent negatives include no anemia, fatigue, melena, muscle weakness, orthopnea or weight loss. Risk factors include NSAIDs (advil daily OTC for joint pains). She has tried nothing for the symptoms. Past procedures do not include an abdominal ultrasound, an EGD, esophageal manometry, esophageal pH monitoring, H. pylori antibody titer or a UGI.       Review of Systems   Constitutional:  Negative for fatigue, fever and weight loss.   HENT:  Negative for hoarse voice.    Respiratory:  Negative for cough and choking.    Cardiovascular:  Negative for chest pain.   Gastrointestinal:  Positive for abdominal pain (chronic LLQ pain, hx of diverticulitis states has only had one flair up about 15 years ago), heartburn and hematochezia (hx of hemorrhoids, intermittent BRBPR streaks on tissue not in the stool or in toilet, last episode about 6 weeks ago, no rectal pain). Negative for constipation (Denies hx of constipation. Has a bm daily up to 3x/day or can go a day without a BM, rates stool type 4-5 on bristol stool scale, strains occasionally, feels empty), diarrhea, dysphagia, hematemesis, jaundice, melena, nausea and vomiting.   Musculoskeletal:  Negative for arthralgias, myalgias and  muscle weakness.         No LMP recorded. Patient is postmenopausal.  Past Medical History:   Diagnosis Date    Diverticulitis     History of diverticulitis 2024    Kidney stone     Urinary tract infection      Past Surgical History:   Procedure Laterality Date     SECTION      x 1    COLONOSCOPY      10 years ago [facility]    CYSTOSCOPY N/A 2022    Procedure: CYSTOSCOPY;  Surgeon: Woo Ramirez MD;  Location: [facility] OR [address] [place] [address];  Service: Urology;  Laterality: N/A;    CYSTOSCOPY W/ URETERAL STENT PLACEMENT Right 2022    Procedure: CYSTOSCOPY, WITH URETERAL STENT INSERTION;  Surgeon: Ree Pak MD;  Location: [facility] OR [address] [place] [address];  Service: Urology;  Laterality: Right;    LASER LITHOTRIPSY Right 2022    Procedure: LITHOTRIPSY, USING LASER;  Surgeon: Woo Ramirez MD;  Location: [facility] OR [address] [place] [address];  Service: Urology;  Laterality: Right;    PYELOSCOPY Right 2022    Procedure: PYELOSCOPY;  Surgeon: Woo Ramirez MD;  Location: [facility] OR [address] [place] [address];  Service: Urology;  Laterality: Right;    RETROGRADE PYELOGRAPHY Right 2022    Procedure: PYELOGRAM, RETROGRADE;  Surgeon: Ree Pak MD;  Location: [facility] OR [address] [place] [address];  Service: Urology;  Laterality: Right;    RETROGRADE PYELOGRAPHY Right 2022    Procedure: PYELOGRAM, RETROGRADE;  Surgeon: Woo Ramirez MD;  Location: [address];  Service: Urology;  Laterality: Right;    URETEROSCOPIC REMOVAL OF URETERIC CALCULUS Right 2022    Procedure: REMOVAL, CALCULUS, URETER, URETEROSCOPIC;  Surgeon: Woo Ramirez MD;  Location: [address];  Service: Urology;  Laterality: Right;    URETEROSCOPY Right 2022    Procedure: URETEROSCOPY;  Surgeon: Woo Ramirez MD;  Location: [address];  Service: Urology;  Laterality: Right;     Family History   Problem Relation Name Age of Onset    Diabetes Mother      Hypertension Mother      Diabetes Father      Hypertension Father      Colon cancer Neg Hx       Stomach cancer Neg Hx      Rectal cancer Neg Hx      Liver cancer Neg Hx       Social History     Tobacco Use    Smoking status: Never    Smokeless tobacco: Never   Substance Use Topics    Alcohol use: No    Drug use: No     Wt Readings from Last 10 Encounters:   04/26/24 103.6 kg (228 lb 6.3 oz)   04/25/24 103.9 kg (229 lb 0.9 oz)   04/15/24 103.4 kg (227 lb 15.3 oz)   04/02/24 103.6 kg (228 lb 4.8 oz)   03/28/24 102.7 kg (226 lb 6.6 oz)   03/16/24 106.6 kg (235 lb 0.2 oz)   09/30/22 103.4 kg (228 lb)   09/29/22 103.5 kg (228 lb 2.8 oz)   09/23/22 100.2 kg (221 lb)   09/22/22 101 kg (222 lb 10.6 oz)     Lab Results   Component Value Date    WBC 7.29 04/02/2024    HGB 12.5 04/02/2024    HCT 39.7 04/02/2024    MCV 96 04/02/2024     04/02/2024     CMP  Sodium   Date Value Ref Range Status   04/02/2024 140 136 - 145 mmol/L Final     Potassium   Date Value Ref Range Status   04/02/2024 3.7 3.5 - 5.1 mmol/L Final     Chloride   Date Value Ref Range Status   04/02/2024 107 95 - 110 mmol/L Final     CO2   Date Value Ref Range Status   04/02/2024 27 23 - 29 mmol/L Final     Glucose   Date Value Ref Range Status   04/02/2024 97 70 - 110 mg/dL Final     BUN   Date Value Ref Range Status   04/02/2024 13 6 - 20 mg/dL Final     Creatinine   Date Value Ref Range Status   04/02/2024 0.7 0.5 - 1.4 mg/dL Final     Calcium   Date Value Ref Range Status   04/02/2024 9.1 8.7 - 10.5 mg/dL Final     Total Protein   Date Value Ref Range Status   04/02/2024 7.3 6.0 - 8.4 g/dL Final     Albumin   Date Value Ref Range Status   04/02/2024 4.0 3.5 - 5.2 g/dL Final     Total Bilirubin   Date Value Ref Range Status   04/02/2024 0.5 0.1 - 1.0 mg/dL Final     Comment:     For infants and newborns, interpretation of results should be based  on gestational age, weight and in agreement with clinical  observations.    Premature Infant recommended reference ranges:  Up to 24 hours.............<8.0 mg/dL  Up to 48 hours............<12.0  "mg/dL  3-5 days..................<15.0 mg/dL  6-29 days.................<15.0 mg/dL       Alkaline Phosphatase   Date Value Ref Range Status   04/02/2024 88 55 - 135 U/L Final     AST   Date Value Ref Range Status   04/02/2024 15 10 - 40 U/L Final     ALT   Date Value Ref Range Status   04/02/2024 16 10 - 44 U/L Final     Anion Gap   Date Value Ref Range Status   04/02/2024 6 (L) 8 - 16 mmol/L Final     Lab Results   Component Value Date    LIPASE 5 09/04/2022     No results found for: "LIPASERES"  No results found for: "TSH"    Reviewed prior medical records including radiology report of CT abdomen pelvis 03/16/2024, CT abdomen pelvis 09/11/2022 & endoscopy history (see surgical history/procedures).    Objective:      Physical Exam  Vitals and nursing note reviewed.   Constitutional:       Appearance: Normal appearance. She is normal weight.   Cardiovascular:      Rate and Rhythm: Normal rate and regular rhythm.      Heart sounds: Normal heart sounds.   Pulmonary:      Breath sounds: Normal breath sounds.   Abdominal:      General: Bowel sounds are normal.      Palpations: Abdomen is soft.      Tenderness: There is abdominal tenderness in the left lower quadrant.      Comments: Tenderness upon palpation to LLQ and lower pelvic area   Skin:     General: Skin is warm and dry.      Coloration: Skin is not jaundiced.   Neurological:      Mental Status: She is alert and oriented to person, place, and time.   Psychiatric:         Mood and Affect: Mood normal.         Behavior: Behavior normal.         Assessment:       1. LLQ pain    2. History of diverticulitis    3. History of rectal bleeding    4. Pelvic pain    5. Gastroesophageal reflux disease, unspecified whether esophagitis present    6. Heartburn    7. History of hemorrhoids    8. Chronic UTI        Plan:       LLQ pain  -  START   dicyclomine (BENTYL) 10 MG capsule; Take 1 capsule (10 mg total) by mouth 2 (two) times daily as needed (abdominal pain).  Dispense: " 120 capsule; Refill: 0  - schedule Colonoscopy, discussed procedure with the patient, including risks and benefits, patient verbalized understanding  -consider repeat imaging if symptoms do not improve or worsen prior to colonoscopy    History of diverticulitis  -     Ambulatory referral/consult to Gastroenterology  -Advised a low fiber diet is recommended for diverticulitis (for about 4 weeks), but to prevent diverticulitis, high fiber diet is recommended.  -Recommended high fiber diet after episode has completely resolved. Recommended daily exercise, adequate water intake (six 8-oz glasses of water daily), and high fiber diet. OTC fiber supplements are recommended if diet does not reach daily fiber goal (25 grams daily), such as Metamucil, Citrucel, or FiberCon take as directed,    History of rectal bleeding   - schedule Colonoscopy, discussed procedure with the patient, including risks and benefits, patient verbalized understanding  - discussed about different etiologies that can cause rectal bleeding, such as but not limited to diverticulosis, polyps, colon mass, colon inflammation or infection, anal fissure or hemorrhoids.     Pelvic pain  -     continue follow up with OBGYN    Gastroesophageal reflux disease, unspecified whether esophagitis present, Heartbrn   - schedule EGD, discussed procedure with patient, including risks and benefits, patient verbalized understanding  -     pantoprazole (PROTONIX) 40 MG tablet; Take 1 tablet (40 mg total) by mouth once daily.  Dispense: 90 tablet; Refill: 3  -Take PPI 30min-1hr before eating breakfast  -Educated patient on lifestyle modifications to help control/reduce reflux/abdominal pain including: avoid large meals, avoid eating within 2-3 hours of bedtime (avoid late night eating & lying down soon after eating), elevate head of bed if nocturnal symptoms are present, smoking cessation (if current smoker), & weight loss (if overweight).   -Educated to avoid known foods  which trigger reflux symptoms & to minimize/avoid high-fat foods, chocolate, caffeine, citrus, alcohol, & tomato products.  -Advised to avoid/limit use of NSAID's, since they can cause GI upset, bleeding, and/or ulcers. If needed, take with food.     History of hemorrhoids   - avoid constipation and straining with bowel movements; try using an OTC stool softener as directed and increase fiber in diet (20-30 grams daily)/OTC fiber supplement such as metamucil (take as directed)  - recommend SITZ baths  - possible referral to colorectal surgery if symptoms persist     Chronic UTI   -continue to follow up with Urology and Infectious Disease    Follow up if symptoms worsen or fail to improve.      If no improvement in symptoms or symptoms worsen, call/follow-up at clinic or go to ER.       [facility]  LAZARO DSOUZA - GASTROENTEROLOGY  [facility], [place]     Dictation software program was used for this note. Please expect some simple typographical  errors in this note.    Encounter includes face to face time and non-face to face time preparing to see the patient (eg, review of tests), obtaining and/or reviewing separately obtained history, documenting clinical information in the electronic or other health record, independently interpreting results (not separately reported) and communicating results to the patient/family/caregiver, or care coordination (not separately reported).

## 2024-05-07 NOTE — PROGRESS NOTES
[facility] Note  Staff: ALEJANDRINA Wills    PCP: None    Chief Complaint: F/UP-UTI/Sepsis    Subjective:        HPI: Yenifer Chatterjee is a 58 y.o. female presents today for routine recheck.    Pt recently presented as a NP on 4/8/2024 for hospital f/up related to UTI/Sepsis.    Patient with past medical history of recurrent UTIs, has had 3 in the last 6 months, prior right kidney stones, had stent placement before.  History of sepsis in 2022 from complicated UTI/pyelonephritis.  She is known to Infectious Disease Dept from recent admission to [facility] for sepsis secondary to ESBL bacteremia in the setting of recurrent complicated UTIs.  As per CT scan there were no stones identified although kidney ultrasound revealed query 3 mm, unable to exclude stone.  She was discharged home on ertapenem via midline for 10 days which she completed on March 27th.  Weekly labs reviewed, normal white count, left shift down to 70%, CRP down to 40%.  Stable kidney function and liver function tests.     Patient is here for follow-up, has no acute complaints.  She denies any constitutional symptoms, no fever chills, nausea or vomiting, no flank pain, no dysuria increased urinary frequency.  She does note decreased urinary volume although reports not drinking enough water as she should.      Recent Imaging:  Renal/Bladder US done on 3/18/2024:  IMPRESSION:  Query a nonshadowing stone in the left kidney, 3 mm. Several simple left renal cysts.      CT Abdomen Pelvis With IV Contrast  3/16/2024:  IMPRESSION:  No acute findings in the abdomen     OV 5/8/24:  UA today showed--pt was unable to give a urine specimen.  Pt overall doing well with no complaints of new UTI voiced today.  No gross hematuria  No dysuria    Hx of Urine Cultures:  4/15/24:  No growth  4/8/24:  E. Coli UTI; tx with Bactrim DS     REVIEW OF SYSTEMS:  A comprehensive 10 system review was performed and is negative except as noted above in  HPI    PMHx:  Past Medical History:   Diagnosis Date    Diverticulitis     History of diverticulitis 2024    Kidney stone     Urinary tract infection      PSHx:  Past Surgical History:   Procedure Laterality Date     SECTION      x 1    COLONOSCOPY      10 years ago [facility]    CYSTOSCOPY N/A 2022    Procedure: CYSTOSCOPY;  Surgeon: Woo Ramirez MD;  Location: [facility] OR [address] [place] [address];  Service: Urology;  Laterality: N/A;    CYSTOSCOPY W/ URETERAL STENT PLACEMENT Right 2022    Procedure: CYSTOSCOPY, WITH URETERAL STENT INSERTION;  Surgeon: Ree Pak MD;  Location: [facility] OR [address] [place] [address];  Service: Urology;  Laterality: Right;    LASER LITHOTRIPSY Right 2022    Procedure: LITHOTRIPSY, USING LASER;  Surgeon: Woo Ramriez MD;  Location: [facility] OR [address] [place] [address];  Service: Urology;  Laterality: Right;    PYELOSCOPY Right 2022    Procedure: PYELOSCOPY;  Surgeon: Woo Raimrez MD;  Location: [facility] OR [address] [place] [address];  Service: Urology;  Laterality: Right;    RETROGRADE PYELOGRAPHY Right 2022    Procedure: PYELOGRAM, RETROGRADE;  Surgeon: Ree Pak MD;  Location: [facility] OR [address] [place] [address];  Service: Urology;  Laterality: Right;    RETROGRADE PYELOGRAPHY Right 2022    Procedure: PYELOGRAM, RETROGRADE;  Surgeon: Woo Ramirez MD;  Location: [address];  Service: Urology;  Laterality: Right;    URETEROSCOPIC REMOVAL OF URETERIC CALCULUS Right 2022    Procedure: REMOVAL, CALCULUS, URETER, URETEROSCOPIC;  Surgeon: Woo Ramirez MD;  Location: [address];  Service: Urology;  Laterality: Right;    URETEROSCOPY Right 2022    Procedure: URETEROSCOPY;  Surgeon: Woo Ramirez MD;  Location: [address];  Service: Urology;  Laterality: Right;       Allergies:  Patient has no known allergies.    Medications: reviewed   Objective:   There were no vitals filed for this visit.    Physical Exam  Constitutional:       Appearance: She is well-developed.   HENT:       Head: Normocephalic and atraumatic.   Eyes:      Conjunctiva/sclera: Conjunctivae normal.      Pupils: Pupils are equal, round, and reactive to light.   Cardiovascular:      Rate and Rhythm: Normal rate and regular rhythm.      Heart sounds: Normal heart sounds.   Pulmonary:      Effort: Pulmonary effort is normal.      Breath sounds: Normal breath sounds.   Abdominal:      General: Bowel sounds are normal.      Palpations: Abdomen is soft.   Musculoskeletal:         General: Normal range of motion.      Cervical back: Normal range of motion and neck supple.   Skin:     General: Skin is warm and dry.   Neurological:      Mental Status: She is alert and oriented to person, place, and time.      Deep Tendon Reflexes: Reflexes are normal and symmetric.   Psychiatric:         Behavior: Behavior normal.         Thought Content: Thought content normal.         Judgment: Judgment normal.           Assessment:       1. Pyelonephritis    2. Nephrolithiasis          Plan:     Pt is overall doing well with no new  complaints voiced.  She has a future appt. Scheduled with Urogyn for evaluation of POP.    F/UP in six months or sooner should new UTI symptoms develop.    [facility]: Active    Stefani Jimenez, [facility]

## 2024-05-08 ENCOUNTER — OFFICE VISIT (OUTPATIENT)
Dept: UROLOGY | Facility: CLINIC | Age: 59
End: 2024-05-08
Payer: COMMERCIAL

## 2024-05-08 ENCOUNTER — TELEPHONE (OUTPATIENT)
Dept: UROGYNECOLOGY | Facility: CLINIC | Age: 59
End: 2024-05-08
Payer: COMMERCIAL

## 2024-05-08 DIAGNOSIS — N12 PYELONEPHRITIS: Primary | ICD-10-CM

## 2024-05-08 DIAGNOSIS — N20.0 NEPHROLITHIASIS: ICD-10-CM

## 2024-05-08 PROCEDURE — 99213 OFFICE O/P EST LOW 20 MIN: CPT | Mod: S$GLB,,, | Performed by: NURSE PRACTITIONER

## 2024-05-08 PROCEDURE — 99999 PR PBB SHADOW E&M-EST. PATIENT-LVL II: CPT | Mod: PBBFAC,,, | Performed by: NURSE PRACTITIONER

## 2024-05-08 NOTE — TELEPHONE ENCOUNTER
----- Message from Laina Steinberg sent at 5/7/2024  4:43 PM CDT -----  Regarding: return call  Contact: patient  Type:  Patient Returning Call    Who Called:  patient  Who Left Message for Patient:  Kala  Does the patient know what this is regarding?:  appointment  Best Call Back Number:  [phone] (home)     Additional Information:  Please call patient to advise.  Thanks!

## 2024-05-29 ENCOUNTER — OFFICE VISIT (OUTPATIENT)
Dept: UROGYNECOLOGY | Facility: CLINIC | Age: 59
End: 2024-05-29
Payer: COMMERCIAL

## 2024-05-29 VITALS
SYSTOLIC BLOOD PRESSURE: 124 MMHG | HEART RATE: 103 BPM | DIASTOLIC BLOOD PRESSURE: 85 MMHG | WEIGHT: 228.38 LBS | BODY MASS INDEX: 38.99 KG/M2 | HEIGHT: 64 IN

## 2024-05-29 DIAGNOSIS — R35.0 URINARY FREQUENCY: ICD-10-CM

## 2024-05-29 DIAGNOSIS — N39.0 RECURRENT URINARY TRACT INFECTION: Primary | ICD-10-CM

## 2024-05-29 DIAGNOSIS — N95.2 ATROPHIC VAGINITIS: ICD-10-CM

## 2024-05-29 DIAGNOSIS — R39.15 URINARY URGENCY: ICD-10-CM

## 2024-05-29 DIAGNOSIS — N95.2 VAGINAL ATROPHY: ICD-10-CM

## 2024-05-29 LAB
BILIRUBIN, UA POC OHS: ABNORMAL
BLOOD, UA POC OHS: ABNORMAL
CLARITY, UA POC OHS: CLEAR
COLOR, UA POC OHS: YELLOW
GLUCOSE, UA POC OHS: NEGATIVE
KETONES, UA POC OHS: NEGATIVE
LEUKOCYTES, UA POC OHS: NEGATIVE
NITRITE, UA POC OHS: NEGATIVE
PH, UA POC OHS: 5.5
POC RESIDUAL URINE VOLUME: 0 ML (ref 0–100)
PROTEIN, UA POC OHS: 30
SPECIFIC GRAVITY, UA POC OHS: >=1.03
UROBILINOGEN, UA POC OHS: 0.2

## 2024-05-29 PROCEDURE — 81003 URINALYSIS AUTO W/O SCOPE: CPT | Mod: QW,S$GLB,, | Performed by: OBSTETRICS & GYNECOLOGY

## 2024-05-29 PROCEDURE — 51798 US URINE CAPACITY MEASURE: CPT | Mod: S$GLB,,, | Performed by: OBSTETRICS & GYNECOLOGY

## 2024-05-29 PROCEDURE — 87086 URINE CULTURE/COLONY COUNT: CPT | Performed by: OBSTETRICS & GYNECOLOGY

## 2024-05-29 PROCEDURE — 99999 PR PBB SHADOW E&M-EST. PATIENT-LVL III: CPT | Mod: PBBFAC,,, | Performed by: OBSTETRICS & GYNECOLOGY

## 2024-05-29 PROCEDURE — 87088 URINE BACTERIA CULTURE: CPT | Performed by: OBSTETRICS & GYNECOLOGY

## 2024-05-29 PROCEDURE — 99215 OFFICE O/P EST HI 40 MIN: CPT | Mod: S$GLB,,, | Performed by: OBSTETRICS & GYNECOLOGY

## 2024-05-29 PROCEDURE — 81001 URINALYSIS AUTO W/SCOPE: CPT | Performed by: OBSTETRICS & GYNECOLOGY

## 2024-05-29 RX ORDER — OXYBUTYNIN CHLORIDE 10 MG/1
10 TABLET, EXTENDED RELEASE ORAL DAILY
Qty: 30 TABLET | Refills: 1 | Status: SHIPPED | OUTPATIENT
Start: 2024-05-29 | End: 2024-07-28

## 2024-05-29 RX ORDER — ESTRADIOL 10 UG/1
10 INSERT VAGINAL
Qty: 24 TABLET | Refills: 3 | Status: SHIPPED | OUTPATIENT
Start: 2024-05-30 | End: 2025-05-30

## 2024-05-29 NOTE — PROGRESS NOTES
"Chief Complaint   Patient presents with    Vaginal Prolapse        HPI: Patient is a 58 y.o. female  who today with c/o a hospitalization about 1.5 months ago. She has a h/o recurrent UTI's. She has been to see ID, urology, and gynecology. She states that they have now been talking about doing a hysterectomy and a bladder lift due to the UTI's..     Patient states that she has never had UTI's until this year when she had three in a row. She had been using vaginal estrogen and used 21 applications. She states that she has not used any for 2 weeks as she did not have any refills. States that her vulvovaginal pain is better although still present.     She states that she urinates a lot. She voids every 1.5-2 hours that she wakes overnight. During the day she can wait to void between 2-3 hours. She states that she has a lot of urgency at nighttime. She is able to better control the symptoms during the day. She denies urgency urinary incontinence. She reports loss of urine with a cough, sneeze or laugh "every once in a blue moon".   Feels like she empties her bladder well. She denies splinting to urinate or have a bowel movement.    She denies vaginal heaviness, pressure or bulge. Does not feel anything when she washes or wipes. She was told that her uterus had dropped.     She currently has constipation as she started weight loss injections. She previously never had a problem. She currently at times needs to strain. Has started to use Miralax and may use MOM. She denies accidental bowel leakage.     Patient drinks 3 bottles of water per day. She drinks up to bedtime.     Review of records indicates that she was admitted on 3/16/24 due to UTI/ Sepsis associated with ESBL E.coli. Blood cultures were positive x 2. Urine culture from 24 positive for ESBL E.coli.     Review of Systems   Constitutional:  Negative for activity change, appetite change, chills, fatigue, fever and unexpected weight change.   HENT:  " Negative for nasal congestion, dental problem, hearing loss, mouth dryness and sore throat.    Eyes:  Negative for pain and eye dryness.   Respiratory:  Negative for cough, shortness of breath and wheezing.    Cardiovascular:  Negative for chest pain, palpitations and leg swelling.   Gastrointestinal:  Negative for abdominal distention, abdominal pain, blood in stool, constipation and rectal pain.   Endocrine: Negative for cold intolerance and heat intolerance.   Genitourinary:  Negative for dyspareunia, hot flashes and vaginal dryness.   Musculoskeletal:  Negative for arthralgias, back pain, gait problem, joint swelling, myalgias, neck pain and neck stiffness.   Integumentary:  Negative for rash.   Neurological:  Negative for dizziness, tremors, seizures, speech difficulty, weakness, light-headedness, numbness and headaches.   Psychiatric/Behavioral:  Negative for confusion, dysphoric mood and sleep disturbance. The patient is not nervous/anxious.         Past Medical History:   Diagnosis Date    Diverticulitis     History of diverticulitis 2024    Kidney stone     Urinary tract infection        Past Surgical History:   Procedure Laterality Date     SECTION      x 1    COLONOSCOPY      10 years ago [facility]    CYSTOSCOPY N/A 2022    Procedure: CYSTOSCOPY;  Surgeon: Woo Ramirez MD;  Location: [address];  Service: Urology;  Laterality: N/A;    CYSTOSCOPY W/ URETERAL STENT PLACEMENT Right 2022    Procedure: CYSTOSCOPY, WITH URETERAL STENT INSERTION;  Surgeon: Ree Pak MD;  Location: [address];  Service: Urology;  Laterality: Right;    LASER LITHOTRIPSY Right 2022    Procedure: LITHOTRIPSY, USING LASER;  Surgeon: Woo Ramirez MD;  Location: [address];  Service: Urology;  Laterality: Right;    PYELOSCOPY Right 2022    Procedure: PYELOSCOPY;  Surgeon: Woo Ramirez MD;  Location: [facility] OR [address] [place] [address];  Service: Urology;  Laterality: Right;     RETROGRADE PYELOGRAPHY Right 09/04/2022    Procedure: PYELOGRAM, RETROGRADE;  Surgeon: Ree Pak MD;  [facility];  Service: Urology;  Laterality: Right;    RETROGRADE PYELOGRAPHY Right 09/23/2022    Procedure: PYELOGRAM, RETROGRADE;  Surgeon: Woo Ramirez MD;  [facility];  Service: Urology;  Laterality: Right;    URETEROSCOPIC REMOVAL OF URETERIC CALCULUS Right 09/23/2022    Procedure: REMOVAL, CALCULUS, URETER, URETEROSCOPIC;  Surgeon: Woo Ramirez MD;  [facility];  Service: Urology;  Laterality: Right;    URETEROSCOPY Right 09/23/2022    Procedure: URETEROSCOPY;  Surgeon: Woo Ramirez MD;  Location: [facility] OR [address] [place] [address];  Service: Urology;  Laterality: Right;         Current Outpatient Medications:     dicyclomine (BENTYL) 10 MG capsule, Take 1 capsule (10 mg total) by mouth 2 (two) times daily as needed (abdominal pain)., Disp: 120 capsule, Rfl: 0    fluticasone propionate (FLONASE) 50 mcg/actuation nasal spray, 2 sprays by Each Nostril route once daily., Disp: , Rfl:     ibuprofen (ADVIL,MOTRIN) 200 MG tablet, Take 200 mg by mouth every 6 (six) hours as needed for Pain., Disp: , Rfl:     ondansetron (ZOFRAN-ODT) 4 MG TbDL, Take 1 tablet (4 mg total) by mouth every 6 (six) hours as needed., Disp: 15 tablet, Rfl: 0    pantoprazole (PROTONIX) 40 MG tablet, Take 1 tablet (40 mg total) by mouth once daily., Disp: 90 tablet, Rfl: 3    [START ON 5/30/2024] estradioL (VAGIFEM) 10 mcg Tab, Place 1 tablet (10 mcg total) vaginally twice a week. Start with one tablet per vagina q.h.s. x7 and then one tablet twice weekly, Disp: 24 tablet, Rfl: 3    oxybutynin (DITROPAN-XL) 10 MG 24 hr tablet, Take 1 tablet (10 mg total) by mouth once daily., Disp: 30 tablet, Rfl: 1    Review of patient's allergies indicates:  No Known Allergies    Family History   Problem Relation Name Age of Onset    No Known Problems Mother      Heart disease Father      Diabetes Paternal  Grandmother      Colon cancer Neg Hx      Stomach cancer Neg Hx      Rectal cancer Neg Hx      Liver cancer Neg Hx         Social History     Socioeconomic History    Marital status:    Tobacco Use    Smoking status: Never    Smokeless tobacco: Never   Substance and Sexual Activity    Alcohol use: No    Drug use: No    Sexual activity: Not Currently     Partners: Male   Social History Narrative    Lives with her significant other of 22 years. Feels safe in her home.      Social Determinants of Health     Financial Resource Strain: Patient Declined (2024)    Overall Financial Resource Strain (CARDIA)     Difficulty of Paying Living Expenses: Patient declined   Food Insecurity: Patient Declined (2024)    Hunger Vital Sign     Worried About Running Out of Food in the Last Year: Patient declined     Ran Out of Food in the Last Year: Patient declined   Transportation Needs: Patient Declined (2024)    PRAPARE - Transportation     Lack of Transportation (Medical): Patient declined     Lack of Transportation (Non-Medical): Patient declined   Physical Activity: Unknown (2024)    Exercise Vital Sign     Days of Exercise per Week: Patient declined   Stress: Patient Declined (2024)    [employer] - Occupational Stress Questionnaire     Feeling of Stress : Patient declined   Housing Stability: Unknown (2024)    Housing Stability Vital Sign     Unable to Pay for Housing in the Last Year: Patient declined       OB History          2    Para   2    Term   2            AB        Living   1         SAB        IAB        Ectopic        Multiple        Live Births   2           Obstetric Comments    section x 1   x 1  Daughter  in .               Gyn History    Mammogram: 24  Pap smear: 4/15/24 negative  LMP: No LMP recorded. Patient is postmenopausal.   Postmenopausal bleeding: denies      INITIAL PHYSICAL EXAMINATION    Vitals:     05/29/24 1318   BP: 124/85   Pulse: 103        General: Healthy in appearance, Well nourished, Affect Normal, NAD.  Neck: Supple, No masses, Trachea midline, Thyroid normal size,  non-tender, no masses or nodules.  Nodes: No clavicular/cervical adenopathy.  Skin: Normal temperature, No atypical lesions or rash.  Heart: Normal sounds, no murmurs  Lungs: Normal respiratory effort.  Gastrointestinal: Non tender, Non distended, No masses, guarding or  rebound, No hepatosplenomegally, No hernia.  Ext: No clubbing, cyanosis, edema or varicosities.  DTR's: 2+ bilaterally  Strength 5/5 bilateral upper and lower extremities    Genitourinary- (Verbal consent obtained; Female chaperone present during the pelvic exam)    Vulva: normal without lesions, masses, atrophy or pain  Urethra: meatus central and normal in appearance, no prolapse/caruncle, no masses or discharge. Empty supine stress test was negative.  Bladder: non-tender, no masses  Vagina: No discharge or lesions, severe atrophy, no masses appreciated.  [See POP-Q]  Cervix: no lesions, no discharge  Uterus:  non-tender, anteverted, approximately 8 weeks size  Adnexa: no masses, non tender.  Rectal: deferred   Neuro: S2-4- pin prick and light touch intact, Anal wink present  Levator strength: 0/5  Levator tenderness: left 8/10 and right 4/10    POP-Q Exam- pelvic organ prolapse quantitative    Aa -2   Anterior Wall Ba -2  Anterior wall C -5  Cervix or cuff   Gh 4  Genital hiatus pb 5  perineal body tvl 8  Total vaginal length   Ap -3  Posterior wall Bp -3  Posterior wall D -7.5  Posterior fornix     with Uterus    POP-Q Stage:1      Office Visit on 05/29/2024   Component Date Value Ref Range Status    Color, POC UA 05/29/2024 Yellow  Yellow, Straw, Colorless Final    Clarity, POC UA 05/29/2024 Clear  Clear Final    Glucose, POC UA 05/29/2024 Negative  Negative Final    Bilirubin, POC UA 05/29/2024 Small (A)  Negative Final    Ketones, POC UA 05/29/2024 Negative   Negative Final    Spec Grav POC UA 05/29/2024 >=1.030  1.005 - 1.030 Final    Blood, POC UA 05/29/2024 Trace-intact (A)  Negative Final    pH, POC UA 05/29/2024 5.5  5.0 - 8.0 Final    Protein, POC UA 05/29/2024 30 (A)  Negative Final    Urobilinogen, POC UA 05/29/2024 0.2  <=1.0 Final    Nitrite, POC UA 05/29/2024 Negative  Negative Final    WBC, POC UA 05/29/2024 Negative  Negative Final    POC Residual Urine Volume 05/29/2024 0  0 - 100 mL Final        ASSESSMENT & PLAN:    Recurrent urinary tract infection    Urinary frequency  -     Ambulatory referral/consult to Urogynecology  -     POCT Urinalysis(Instrument)  -     POCT Bladder Scan  -     oxybutynin (DITROPAN-XL) 10 MG 24 hr tablet; Take 1 tablet (10 mg total) by mouth once daily.  Dispense: 30 tablet; Refill: 1    Urinary urgency  -     oxybutynin (DITROPAN-XL) 10 MG 24 hr tablet; Take 1 tablet (10 mg total) by mouth once daily.  Dispense: 30 tablet; Refill: 1    Vaginal atrophy    Atrophic vaginitis  -     estradioL (VAGIFEM) 10 mcg Tab; Place 1 tablet (10 mcg total) vaginally twice a week. Start with one tablet per vagina q.h.s. x7 and then one tablet twice weekly  Dispense: 24 tablet; Refill: 3       Exam findings and treatment options were discussed in detail with the patient. Her history is consistent with recurrent bladder infections. We discussed various strategies to reduce her risk for infection including continued use of vaginal estrogen which has been shown to reduce recurrent UTI's. We also discussed adding D-Mannose, High concentrated cranberry supplement, and probiotics with Lactobacillus crispatus.   Her renal ultrasound from 3/18/24 showed a possible left kidney stone, about 3 mm in size and several simple cysts. Cystoscopy was previously performed on 9/4/22 by Dr Pak which showed that the bladder had a lot of debris. A stent was placed due to moderate hydronephrosis associated with a stone.  On 9/23/22 Dr. Ramirez performed a right  ureteroscopy, cystoscopy, lithotripsy, stone extraction, and stent exchange.     We may consider a repeat cystoscopy if symptoms do not improve with conservative management.     For her OAB symptoms we discussed the care pathway for treating OAB We discussed behavioral modification as well as medical therapy and potentially advanced treatment options.     She will be scheduled for a follow up visit to discuss her progress and will determine whether additional testing or a change in her management is indicated.    All questions were answered today. The patient was encouraged to contact the office as needed with any additional questions or concerns.     Total time spent on visit was 50 minutes.  This includes face to face time and non-face to face time preparing to see the patient (eg, review of tests), Obtaining and/or reviewing separately obtained history, Documenting clinical information in the electronic or other health record, Independently interpreting resultsand communicating results to the patient/family/caregiver, or Care coordination.    Sri Ellison MD

## 2024-05-30 LAB
BACTERIA #/AREA URNS AUTO: ABNORMAL /HPF
MICROSCOPIC COMMENT: ABNORMAL
WBC #/AREA URNS AUTO: >100 /HPF (ref 0–5)
WBC CLUMPS UR QL AUTO: ABNORMAL

## 2024-05-31 LAB — BACTERIA UR CULT: ABNORMAL

## 2024-06-02 DIAGNOSIS — N39.0 ACUTE UTI: Primary | ICD-10-CM

## 2024-06-02 RX ORDER — AMOXICILLIN AND CLAVULANATE POTASSIUM 875; 125 MG/1; MG/1
1 TABLET, FILM COATED ORAL 2 TIMES DAILY
Qty: 14 TABLET | Refills: 0 | Status: SHIPPED | OUTPATIENT
Start: 2024-06-02 | End: 2024-06-09

## 2024-06-06 ENCOUNTER — TELEPHONE (OUTPATIENT)
Dept: UROLOGY | Facility: CLINIC | Age: 59
End: 2024-06-06
Payer: COMMERCIAL

## 2024-06-06 ENCOUNTER — LAB VISIT (OUTPATIENT)
Dept: LAB | Facility: HOSPITAL | Age: 59
End: 2024-06-06
Attending: NURSE PRACTITIONER
Payer: COMMERCIAL

## 2024-06-06 DIAGNOSIS — N12 PYELONEPHRITIS: ICD-10-CM

## 2024-06-06 DIAGNOSIS — N12 PYELONEPHRITIS: Primary | ICD-10-CM

## 2024-06-06 PROCEDURE — 87086 URINE CULTURE/COLONY COUNT: CPT | Performed by: NURSE PRACTITIONER

## 2024-06-06 NOTE — TELEPHONE ENCOUNTER
Advise pt to drop off urine sample at OP Lab at [facility] at this time for evaluation.  Should she begin having fever, chills then she needs to be evaluated by ER today.

## 2024-06-06 NOTE — TELEPHONE ENCOUNTER
----- Message from Jennifer Díaz MA sent at 6/6/2024  1:35 PM CDT -----    ----- Message -----  From: Kevin Cisse  Sent: 6/6/2024   1:15 PM CDT  To: Barbara [facility]TIT Staff    Name of Who is Calling:NATHANIEL LEA [[id number]]        What is the request in detail:Pt would like a callback from the office in regards to discussing if she can drop by to do urine sample or if appt is need or if provider can place order. Pt stated Np Barbara told her if she was to experience and UTI symptoms to please call or drop in for emergency visit due to nature of condition.Please advise thank you       Can the clinic reply by [facility]:NO        What Number to Call Back if not in [facility]:.Telephone Information:  Mobile          [phone]

## 2024-06-06 NOTE — TELEPHONE ENCOUNTER
Advise pt to drop off urine sample at OP Lab at [facility] at this time for evaluation.  Should she begin having fever, chills then she needs to be evaluated by ER today.    Orders are in.

## 2024-06-09 LAB — BACTERIA UR CULT: NO GROWTH

## 2024-06-12 ENCOUNTER — TELEPHONE (OUTPATIENT)
Dept: UROLOGY | Facility: CLINIC | Age: 59
End: 2024-06-12
Payer: COMMERCIAL

## 2024-06-12 ENCOUNTER — PATIENT MESSAGE (OUTPATIENT)
Dept: UROLOGY | Facility: CLINIC | Age: 59
End: 2024-06-12
Payer: COMMERCIAL

## 2024-06-26 ENCOUNTER — OFFICE VISIT (OUTPATIENT)
Dept: UROGYNECOLOGY | Facility: CLINIC | Age: 59
End: 2024-06-26
Payer: COMMERCIAL

## 2024-06-26 VITALS
SYSTOLIC BLOOD PRESSURE: 145 MMHG | WEIGHT: 228.38 LBS | HEIGHT: 64 IN | BODY MASS INDEX: 38.99 KG/M2 | DIASTOLIC BLOOD PRESSURE: 91 MMHG | HEART RATE: 89 BPM

## 2024-06-26 DIAGNOSIS — R39.15 URINARY URGENCY: Primary | ICD-10-CM

## 2024-06-26 DIAGNOSIS — N39.0 RECURRENT URINARY TRACT INFECTION: ICD-10-CM

## 2024-06-26 DIAGNOSIS — R35.0 URINARY FREQUENCY: ICD-10-CM

## 2024-06-26 DIAGNOSIS — N95.2 VAGINAL ATROPHY: ICD-10-CM

## 2024-06-26 LAB
BILIRUBIN, UA POC OHS: NEGATIVE
BLOOD, UA POC OHS: ABNORMAL
CLARITY, UA POC OHS: CLEAR
COLOR, UA POC OHS: YELLOW
GLUCOSE, UA POC OHS: NEGATIVE
KETONES, UA POC OHS: NEGATIVE
LEUKOCYTES, UA POC OHS: NEGATIVE
NITRITE, UA POC OHS: NEGATIVE
PH, UA POC OHS: 6
PROTEIN, UA POC OHS: NEGATIVE
SPECIFIC GRAVITY, UA POC OHS: >=1.03
UROBILINOGEN, UA POC OHS: 0.2

## 2024-06-26 PROCEDURE — 99999 PR PBB SHADOW E&M-EST. PATIENT-LVL III: CPT | Mod: PBBFAC,,, | Performed by: OBSTETRICS & GYNECOLOGY

## 2024-06-26 PROCEDURE — 99212 OFFICE O/P EST SF 10 MIN: CPT | Mod: S$GLB,,, | Performed by: OBSTETRICS & GYNECOLOGY

## 2024-06-26 PROCEDURE — 81003 URINALYSIS AUTO W/O SCOPE: CPT | Mod: QW,S$GLB,, | Performed by: OBSTETRICS & GYNECOLOGY

## 2024-06-26 RX ORDER — OXYBUTYNIN CHLORIDE 10 MG/1
10 TABLET, EXTENDED RELEASE ORAL DAILY
Qty: 90 TABLET | Refills: 0 | Status: SHIPPED | OUTPATIENT
Start: 2024-06-26 | End: 2024-09-24

## 2024-06-26 NOTE — PROGRESS NOTES
Chief Complaint   Patient presents with    Medication follow up        HPI: Patient is a 58 y.o. female  presents today for a follow up appointment. Her last visit was on 24 and she was started on Oxybutynin and Vagifem. Review of records indicates that her last UTI was on 24 when I last saw her. A urine culture from 24 was negative. Patient was admitted on 3/16/24 due to UTI/ Sepsis associated with ESBL E.coli. Blood cultures were positive x 2. Urine culture from 24 positive for ESBL E.coli.     She is using the Vagifem two times per week. Has not yet tried to start the Cranberry supplement, probiotics and D-Mannose.     Patient reports that the pharmacy never called her about the Oxybutynin. Does not believe she is taking it. Continues to have symptoms of urinary urgency and frequency. Remains interested in trying a medication.     On July 10 she has a colonoscopy and  she has an endoscopy.       REVIEW OF SYSTEMS:  A full 14-point ROS was performed and was significant for those  mentioned in the HPI.     The following portions of the patient's history were reviewed and updated as appropriate: allergies, current medications, past medical history, past surgical history and problem list.    PHYSICAL EXAMINATION    Vitals:    24 1511   BP: (!) 145/91   Pulse: 89        General: Healthy in appearance, Well nourished, Affect Normal, NAD.    No visits with results within 1 Day(s) from this visit.   Latest known visit with results is:   Lab Visit on 2024   Component Date Value Ref Range Status    Specimen UA 2024 Urine, Clean Catch   Final    Color, UA 2024 Yellow  Yellow, Straw, Heather Final    Appearance, UA 2024 Clear  Clear Final    pH, UA 2024 5.0  5.0 - 8.0 Final    Specific Gravity, UA 2024 1.020  1.005 - 1.030 Final    Protein, UA 2024 Negative  Negative Final    Glucose, UA 2024 Negative  Negative Final    Ketones, UA 2024  Trace (A)  Negative Final    Bilirubin (UA) 06/06/2024 Negative  Negative Final    Occult Blood UA 06/06/2024 1+ (A)  Negative Final    Nitrite, UA 06/06/2024 Negative  Negative Final    Urobilinogen, UA 06/06/2024 Negative  <2.0 EU/dL Final    Leukocytes, UA 06/06/2024 Negative  Negative Final    RBC, UA 06/06/2024 0  0 - 4 /hpf Final    WBC, UA 06/06/2024 1  0 - 5 /hpf Final    Squam Epithel, UA 06/06/2024 2  /hpf Final    Microscopic Comment 06/06/2024 SEE COMMENT   Final        ASSESSMENT & PLAN:    Urinary urgency  -     oxybutynin (DITROPAN-XL) 10 MG 24 hr tablet; Take 1 tablet (10 mg total) by mouth once daily.  Dispense: 90 tablet; Refill: 0    Urinary frequency  -     oxybutynin (DITROPAN-XL) 10 MG 24 hr tablet; Take 1 tablet (10 mg total) by mouth once daily.  Dispense: 90 tablet; Refill: 0    Vaginal atrophy    Recurrent urinary tract infection       59 yo with OAB and recurrent UTIs presented for a follow up appointment. Reports that she is forgetting to use the Vagifem at times. Suggested that she could take it Mon, Wed, Fri to get into a routine.   She is uncertain about the Oxybutynin script and will look at home to see if she picked it up and is taking it. If she is not taking it she will fill the written prescription provided to her today. If she is taking it she will send me a message and I will switch her to Myrbetriq or Gemtesa. She will return in about 2 months for a follow up evaluation.     All questions were answered today. The patient was encouraged to contact the office as needed with any additional questions or concerns.     Total time spent on visit was 15 minutes.  This includes face to face time and non-face to face time preparing to see the patient (eg, review of tests), Obtaining and/or reviewing separately obtained history, Documenting clinical information in the electronic or other health record, Independently interpreting resultsand communicating results to the  patient/family/caregiver, or Care coordination.    Sri Ellison MD

## 2024-06-28 ENCOUNTER — HOSPITAL ENCOUNTER (OUTPATIENT)
Facility: HOSPITAL | Age: 59
Discharge: HOME | End: 2024-06-28
Attending: INTERNAL MEDICINE | Admitting: INTERNAL MEDICINE
Payer: COMMERCIAL

## 2024-06-28 ENCOUNTER — ANESTHESIA (OUTPATIENT)
Dept: ENDOSCOPY | Facility: HOSPITAL | Age: 59
End: 2024-06-28
Payer: COMMERCIAL

## 2024-06-28 ENCOUNTER — ANESTHESIA EVENT (OUTPATIENT)
Dept: ENDOSCOPY | Facility: HOSPITAL | Age: 59
End: 2024-06-28
Payer: COMMERCIAL

## 2024-06-28 VITALS
HEART RATE: 65 BPM | SYSTOLIC BLOOD PRESSURE: 111 MMHG | DIASTOLIC BLOOD PRESSURE: 63 MMHG | BODY MASS INDEX: 37.05 KG/M2 | TEMPERATURE: 98 F | HEIGHT: 64 IN | OXYGEN SATURATION: 95 % | WEIGHT: 217 LBS | RESPIRATION RATE: 16 BRPM

## 2024-06-28 DIAGNOSIS — K21.9 GERD (GASTROESOPHAGEAL REFLUX DISEASE): ICD-10-CM

## 2024-06-28 DIAGNOSIS — K21.9 GASTROESOPHAGEAL REFLUX DISEASE, UNSPECIFIED WHETHER ESOPHAGITIS PRESENT: ICD-10-CM

## 2024-06-28 PROCEDURE — 25000003 PHARM REV CODE 250: Performed by: NURSE ANESTHETIST, CERTIFIED REGISTERED

## 2024-06-28 PROCEDURE — 43239 EGD BIOPSY SINGLE/MULTIPLE: CPT | Mod: ,,, | Performed by: INTERNAL MEDICINE

## 2024-06-28 PROCEDURE — 27201012 HC FORCEPS, HOT/COLD, DISP: Performed by: INTERNAL MEDICINE

## 2024-06-28 PROCEDURE — 37000009 HC ANESTHESIA EA ADD 15 MINS: Performed by: INTERNAL MEDICINE

## 2024-06-28 PROCEDURE — 25000003 PHARM REV CODE 250: Performed by: INTERNAL MEDICINE

## 2024-06-28 PROCEDURE — 43239 EGD BIOPSY SINGLE/MULTIPLE: CPT | Performed by: INTERNAL MEDICINE

## 2024-06-28 PROCEDURE — 37000008 HC ANESTHESIA 1ST 15 MINUTES: Performed by: INTERNAL MEDICINE

## 2024-06-28 RX ORDER — PANTOPRAZOLE SODIUM 40 MG/1
TABLET, DELAYED RELEASE ORAL
Qty: 60 TABLET | Refills: 4 | Status: SHIPPED | OUTPATIENT
Start: 2024-06-28 | End: 2024-07-03 | Stop reason: SDUPTHER

## 2024-06-28 RX ORDER — SODIUM CHLORIDE 9 MG/ML
INJECTION, SOLUTION INTRAVENOUS CONTINUOUS
Status: DISCONTINUED | OUTPATIENT
Start: 2024-06-28 | End: 2024-06-28 | Stop reason: HOSPADM

## 2024-06-28 RX ORDER — LIDOCAINE HYDROCHLORIDE 20 MG/ML
INJECTION INTRAVENOUS
Status: DISCONTINUED | OUTPATIENT
Start: 2024-06-28 | End: 2024-06-28

## 2024-06-28 RX ORDER — PROPOFOL 10 MG/ML
VIAL (ML) INTRAVENOUS
Status: DISCONTINUED | OUTPATIENT
Start: 2024-06-28 | End: 2024-06-28

## 2024-06-28 RX ADMIN — SODIUM CHLORIDE: 9 INJECTION, SOLUTION INTRAVENOUS at 07:06

## 2024-06-28 RX ADMIN — LIDOCAINE HYDROCHLORIDE 100 MG: 20 INJECTION, SOLUTION INTRAVENOUS at 08:06

## 2024-06-28 RX ADMIN — Medication 100 MG: at 08:06

## 2024-06-28 RX ADMIN — Medication 50 MG: at 08:06

## 2024-06-28 NOTE — PLAN OF CARE
Vss, issa po fluids, denies pain, ambulates easily. IV removed, catheter intact. Discharge instructions provided and states understanding. States ready to go home.  Discharged from facility with family per wheelchair.

## 2024-06-28 NOTE — H&P
"[facility]    CC: GERD    HPI 58 y.o. female presents for follow up of GERD    Past Medical History:   Diagnosis Date    Diverticulitis     History of diverticulitis 04/25/2024    Kidney stone     Urinary tract infection        Allergies and Medications reviewed     Review of Systems  General ROS: negative for - chills, fever or weight loss  Cardiovascular ROS: no chest pain or dyspnea on exertion  Gastrointestinal ROS: + GERD    Physical Examination  /74 (BP Location: Left arm, Patient Position: Lying)   Pulse 66   Temp 98.1 °F (36.7 °C) (Skin)   Resp 18   Ht 5' 4" (1.626 m)   Wt 98.4 kg (217 lb)   SpO2 99%   Breastfeeding No   BMI 37.25 kg/m²   General appearance: alert, cooperative, no distress  HENT: Normocephalic, atraumatic, neck symmetrical, no nasal discharge, sclera anicteric   Lungs: clear to auscultation bilaterally, symmetric chest wall expansion bilaterally  Heart: regular rate and rhythm without rub; no displacement of the PMI   Abdomen: soft  Extremities: extremities symmetric; no clubbing, cyanosis, or edema        Labs:  Lab Results   Component Value Date    WBC 7.29 04/02/2024    HGB 12.5 04/02/2024    HCT 39.7 04/02/2024    MCV 96 04/02/2024     04/02/2024     Assessment:   58 y.o. female presents for EGD    Plan:  -Proceed to EGD    Danica Villafuerte MD  [facility]  [address], [address]  [place] [address]  Office: [phone]  Fax: [phone]   "

## 2024-06-28 NOTE — TRANSFER OF CARE
"Anesthesia Transfer of Care Note    Patient: Yenifer Chatterjee    Procedure(s) Performed: Procedure(s) (LRB):  EGD (ESOPHAGOGASTRODUODENOSCOPY) (N/A)    Patient location: GI    Anesthesia Type: general    Transport from OR: Transported from OR on room air with adequate spontaneous ventilation    Post pain: adequate analgesia    Post assessment: no apparent anesthetic complications    Post vital signs: stable    Level of consciousness: awake    Nausea/Vomiting: no nausea/vomiting    Complications: none    Transfer of care protocol was followed      Last vitals: Visit Vitals  /74 (BP Location: Left arm, Patient Position: Lying)   Pulse 66   Temp 36.7 °C (98.1 °F) (Skin)   Resp 18   Ht 5' 4" (1.626 m)   Wt 98.4 kg (217 lb)   SpO2 99%   Breastfeeding No   BMI 37.25 kg/m²     "

## 2024-06-28 NOTE — ANESTHESIA POSTPROCEDURE EVALUATION
Anesthesia Post Evaluation    Patient: Yenifer Chatterjee    Procedure(s) Performed: Procedure(s) (LRB):  EGD (ESOPHAGOGASTRODUODENOSCOPY) (N/A)    Final Anesthesia Type: general      Patient location during evaluation: PACU  Patient participation: Yes- Able to Participate  Level of consciousness: awake and alert and oriented  Post-procedure vital signs: reviewed and stable  Pain management: adequate  Airway patency: patent    PONV status at discharge: No PONV  Anesthetic complications: no      Cardiovascular status: blood pressure returned to baseline and stable  Respiratory status: unassisted and spontaneous ventilation  Hydration status: euvolemic  Follow-up not needed.              Vitals Value Taken Time   /71 06/28/24 0916   Temp 36.7 °C (98.1 °F) 06/28/24 0915   Pulse 60 06/28/24 0919   Resp 16 06/28/24 0915   SpO2 97 % 06/28/24 0919   Vitals shown include unfiled device data.      Event Time   Out of Recovery 09:34:17         Pain/Ed Score: Ed Score: 10 (6/28/2024  9:18 AM)

## 2024-06-28 NOTE — PROVATION PATIENT INSTRUCTIONS
Discharge Summary/Instructions after an Endoscopic Procedure  Patient Name: Yenifer Chatterjee  Patient MRN: [id number]  Patient YOB: 1965  Friday, June 28, 2024  Danica Villafuerte MD  Dear patient,  As a result of recent federal legislation (The Federal Cures Act), you may   receive lab or pathology results from your procedure in your [facility]   account before your physician is able to contact you. Your physician or   their representative will relay the results to you with their   recommendations at their soonest availability.  Thank you,  RESTRICTIONS:  During your procedure today, you received medications for sedation.  These   medications may affect your judgment, balance and coordination.  Therefore,   for 24 hours, you have the following restrictions:   - DO NOT drive a car, operate machinery, make legal/financial decisions,   sign important papers or drink alcohol.    ACTIVITY:  Today: no heavy lifting, straining or running due to procedural   sedation/anesthesia.  The following day: return to full activity including work.  DIET:  Eat and drink normally unless instructed otherwise.     TREATMENT FOR COMMON SIDE EFFECTS:  - Mild abdominal pain, nausea, belching, bloating or excessive gas:  rest,   eat lightly and use a heating pad.  - Sore Throat: treat with throat lozenges and/or gargle with warm salt   water.  - Because air was used during the procedure, expelling large amounts of air   from your rectum or belching is normal.  - If a bowel prep was taken, you may not have a bowel movement for 1-3 days.    This is normal.  SYMPTOMS TO WATCH FOR AND REPORT TO YOUR PHYSICIAN:  1. Abdominal pain or bloating, other than gas cramps.  2. Chest pain.  3. Back pain.  4. Signs of infection such as: chills or fever occurring within 24 hours   after the procedure.  5. Rectal bleeding, which would show as bright red, maroon, or black stools.   (A tablespoon of blood from the rectum is not serious,  especially if   hemorrhoids are present.)  6. Vomiting.  7. Weakness or dizziness.  GO DIRECTLY TO THE NEAREST EMERGENCY ROOM IF YOU HAVE ANY OF THE FOLLOWING:      Difficulty breathing              Chills and/or fever over 101 F   Persistent vomiting and/or vomiting blood   Severe abdominal pain   Severe chest pain   Black, tarry stools   Bleeding- more than one tablespoon   Any other symptom or condition that you feel may need urgent attention  Your doctor recommends these additional instructions:  If any biopsies were taken, your doctors clinic will contact you in 1 to 2   weeks with any results.  - Await pathology results.   - Discharge patient to home (with escort).   - Patient has a contact number available for emergencies.  The signs and   symptoms of potential delayed complications were discussed with the   patient.  Return to normal activities tomorrow.  Written discharge   instructions were provided to the patient.   - Resume previous diet.   - PPI BID x 8 weeks then daily   - Await pathology results.   - Repeat upper endoscopy in 8 weeks to evaluate the response to therapy.   - Return to my office PRN.  For questions, problems or results please call your physician - Danica Villafuerte MD at Work:  [phone].  [facility], EMERGENCY ROOM PHONE NUMBER: [phone]  IF A COMPLICATION OR EMERGENCY SITUATION ARISES AND YOU ARE UNABLE TO REACH   YOUR PHYSICIAN - GO DIRECTLY TO THE EMERGENCY ROOM.  Danica Villafuerte MD  6/28/2024 8:52:09 AM  This report has been verified and signed electronically.  Dear patient,  As a result of recent federal legislation (The Federal Cures Act), you may   receive lab or pathology results from your procedure in your [facility]   account before your physician is able to contact you. Your physician or   their representative will relay the results to you with their   recommendations at their soonest availability.  Thank you,  PROVATION

## 2024-06-28 NOTE — ANESTHESIA PREPROCEDURE EVALUATION
06/28/2024  Yenifer Chatterjee is a 58 y.o., female.      Pre-op Assessment    I have reviewed the Patient Summary Reports.     I have reviewed the Nursing Notes. I have reviewed the NPO Status.   I have reviewed the Medications.     Review of Systems  Anesthesia Hx:  No problems with previous Anesthesia                Social:  Non-Smoker       Cardiovascular:  Cardiovascular Normal                                            Pulmonary:  Pulmonary Normal                       Renal/:  Chronic Renal Disease renal calculi               Neurological:  Neurology Normal                                      Endocrine:  Endocrine Normal          Obesity / BMI > 30      Physical Exam  General: Well nourished, Cooperative, Alert and Oriented    Airway:  Mallampati: II   Mouth Opening: Normal  TM Distance: Normal  Neck ROM: Normal ROM    Anesthesia Plan  Type of Anesthesia, risks & benefits discussed:    Anesthesia Type: Gen ETT, Gen Supraglottic Airway, Gen Natural Airway, MAC  Intra-op Monitoring Plan: Standard ASA Monitors  Post Op Pain Control Plan: multimodal analgesia  Induction:  IV  Airway Plan: Direct, Video and Fiberoptic, Post-Induction  Informed Consent: Informed consent signed with the Patient and all parties understand the risks and agree with anesthesia plan.  All questions answered.   ASA Score: 2    Ready For Surgery From Anesthesia Perspective.   .

## 2024-07-03 DIAGNOSIS — K21.9 GASTROESOPHAGEAL REFLUX DISEASE, UNSPECIFIED WHETHER ESOPHAGITIS PRESENT: ICD-10-CM

## 2024-07-03 RX ORDER — PANTOPRAZOLE SODIUM 40 MG/1
TABLET, DELAYED RELEASE ORAL
Qty: 60 TABLET | Refills: 4 | Status: SHIPPED | OUTPATIENT
Start: 2024-07-03 | End: 2024-10-30

## 2024-07-10 ENCOUNTER — HOSPITAL ENCOUNTER (OUTPATIENT)
Facility: HOSPITAL | Age: 59
Discharge: HOME | End: 2024-07-10
Attending: INTERNAL MEDICINE | Admitting: INTERNAL MEDICINE
Payer: COMMERCIAL

## 2024-07-10 ENCOUNTER — ANESTHESIA EVENT (OUTPATIENT)
Dept: ENDOSCOPY | Facility: HOSPITAL | Age: 59
End: 2024-07-10
Payer: COMMERCIAL

## 2024-07-10 ENCOUNTER — ANESTHESIA (OUTPATIENT)
Dept: ENDOSCOPY | Facility: HOSPITAL | Age: 59
End: 2024-07-10
Payer: COMMERCIAL

## 2024-07-10 DIAGNOSIS — R10.32 LLQ PAIN: ICD-10-CM

## 2024-07-10 PROCEDURE — 37000008 HC ANESTHESIA 1ST 15 MINUTES: Performed by: INTERNAL MEDICINE

## 2024-07-10 PROCEDURE — 25000003 PHARM REV CODE 250: Performed by: INTERNAL MEDICINE

## 2024-07-10 PROCEDURE — 25000003 PHARM REV CODE 250: Performed by: STUDENT IN AN ORGANIZED HEALTH CARE EDUCATION/TRAINING PROGRAM

## 2024-07-10 PROCEDURE — 45378 DIAGNOSTIC COLONOSCOPY: CPT | Mod: ,,, | Performed by: INTERNAL MEDICINE

## 2024-07-10 PROCEDURE — 37000009 HC ANESTHESIA EA ADD 15 MINS: Performed by: INTERNAL MEDICINE

## 2024-07-10 PROCEDURE — 63600175 PHARM REV CODE 636 W HCPCS: Performed by: STUDENT IN AN ORGANIZED HEALTH CARE EDUCATION/TRAINING PROGRAM

## 2024-07-10 PROCEDURE — 45378 DIAGNOSTIC COLONOSCOPY: CPT | Performed by: INTERNAL MEDICINE

## 2024-07-10 RX ORDER — SODIUM CHLORIDE 9 MG/ML
INJECTION, SOLUTION INTRAVENOUS CONTINUOUS
Status: DISCONTINUED | OUTPATIENT
Start: 2024-07-10 | End: 2024-07-10 | Stop reason: HOSPADM

## 2024-07-10 RX ORDER — PROPOFOL 10 MG/ML
VIAL (ML) INTRAVENOUS
Status: DISCONTINUED | OUTPATIENT
Start: 2024-07-10 | End: 2024-07-10

## 2024-07-10 RX ORDER — LIDOCAINE HYDROCHLORIDE 20 MG/ML
INJECTION INTRAVENOUS
Status: DISCONTINUED | OUTPATIENT
Start: 2024-07-10 | End: 2024-07-10

## 2024-07-10 RX ADMIN — PROPOFOL 20 MG: 10 INJECTION, EMULSION INTRAVENOUS at 09:07

## 2024-07-10 RX ADMIN — PROPOFOL 10 MG: 10 INJECTION, EMULSION INTRAVENOUS at 09:07

## 2024-07-10 RX ADMIN — PROPOFOL 50 MG: 10 INJECTION, EMULSION INTRAVENOUS at 09:07

## 2024-07-10 RX ADMIN — LIDOCAINE HYDROCHLORIDE 80 MG: 20 INJECTION, SOLUTION INTRAVENOUS at 09:07

## 2024-07-10 RX ADMIN — SODIUM CHLORIDE: 9 INJECTION, SOLUTION INTRAVENOUS at 08:07

## 2024-07-10 NOTE — ANESTHESIA PREPROCEDURE EVALUATION
07/10/2024  Yenifer Chatterjee is a 58 y.o., female.      Pre-op Assessment    I have reviewed the Patient Summary Reports.     I have reviewed the Nursing Notes. I have reviewed the NPO Status.   I have reviewed the Medications.     Review of Systems  Anesthesia Hx:  No problems with previous Anesthesia                Social:  Non-Smoker       Cardiovascular:  Cardiovascular Normal                                            Pulmonary:  Pulmonary Normal                       Renal/:  Chronic Renal Disease renal calculi       Kidney Function/Disease             Hepatic/GI:  Bowel Prep.                Neurological:  Neurology Normal                                      Endocrine:  Endocrine Normal          Obesity / BMI > 30      Physical Exam  General: Well nourished, Cooperative, Alert and Oriented    Airway:  Mallampati: II   Mouth Opening: Normal  TM Distance: Normal  Neck ROM: Normal ROM    Dental:  Intact    Chest/Lungs:  Normal Respiratory Rate    Heart:  Rate: Normal      Anesthesia Plan  Type of Anesthesia, risks & benefits discussed:    Anesthesia Type: Gen Natural Airway  Intra-op Monitoring Plan: Standard ASA Monitors  Induction:  IV  Informed Consent: Informed consent signed with the Patient and all parties understand the risks and agree with anesthesia plan.  All questions answered.   ASA Score: 2    Ready For Surgery From Anesthesia Perspective.     .

## 2024-07-10 NOTE — H&P
[facility]    CC: Abdominal Pain    HPI 58 y.o. female presents for evaluation of abdominal pain    Past Medical History:   Diagnosis Date    Diverticulitis     Esophagitis     History of diverticulitis 04/25/2024    Kidney stone     Urinary tract infection        Allergies and Medications reviewed     Review of Systems  General ROS: negative for - chills, fever or weight loss  Cardiovascular ROS: no chest pain or dyspnea on exertion  Gastrointestinal ROS: + abdominal pain    Physical Examination  /64 (BP Location: Left arm, Patient Position: Lying)   Pulse 67   Temp 96.8 °F (36 °C) (Skin)   Resp 18   Ht 5' 4" (1.626 m)   Wt 98 kg (216 lb)   SpO2 98%   Breastfeeding No   BMI 37.08 kg/m²   General appearance: alert, cooperative, no distress  HENT: Normocephalic, atraumatic, neck symmetrical, no nasal discharge, sclera anicteric   Lungs: clear to auscultation bilaterally, symmetric chest wall expansion bilaterally  Heart: regular rate and rhythm without rub; no displacement of the PMI   Abdomen: soft  Extremities: extremities symmetric; no clubbing, cyanosis, or edema        Labs:  Lab Results   Component Value Date    WBC 7.29 04/02/2024    HGB 12.5 04/02/2024    HCT 39.7 04/02/2024    MCV 96 04/02/2024     04/02/2024         Assessment:   58 y.o. female presents for colonoscopy     Plan:  -Proceed to colonoscopy     Danica Villafuerte MD  [facility]  [address], [address]  [place] [address]  Office: [phone]  Fax: [phone]   "

## 2024-07-10 NOTE — PROVATION PATIENT INSTRUCTIONS
Discharge Summary/Instructions after an Endoscopic Procedure  Patient Name: Yenifer Chatterjee  Patient MRN: [id number]  Patient YOB: 1965  Wednesday, July 10, 2024  Danica Villafuerte MD  Dear patient,  As a result of recent federal legislation (The Federal Cures Act), you may   receive lab or pathology results from your procedure in your [facility]   account before your physician is able to contact you. Your physician or   their representative will relay the results to you with their   recommendations at their soonest availability.  Thank you,  RESTRICTIONS:  During your procedure today, you received medications for sedation.  These   medications may affect your judgment, balance and coordination.  Therefore,   for 24 hours, you have the following restrictions:   - DO NOT drive a car, operate machinery, make legal/financial decisions,   sign important papers or drink alcohol.    ACTIVITY:  Today: no heavy lifting, straining or running due to procedural   sedation/anesthesia.  The following day: return to full activity including work.  DIET:  Eat and drink normally unless instructed otherwise.     TREATMENT FOR COMMON SIDE EFFECTS:  - Mild abdominal pain, nausea, belching, bloating or excessive gas:  rest,   eat lightly and use a heating pad.  - Sore Throat: treat with throat lozenges and/or gargle with warm salt   water.  - Because air was used during the procedure, expelling large amounts of air   from your rectum or belching is normal.  - If a bowel prep was taken, you may not have a bowel movement for 1-3 days.    This is normal.  SYMPTOMS TO WATCH FOR AND REPORT TO YOUR PHYSICIAN:  1. Abdominal pain or bloating, other than gas cramps.  2. Chest pain.  3. Back pain.  4. Signs of infection such as: chills or fever occurring within 24 hours   after the procedure.  5. Rectal bleeding, which would show as bright red, maroon, or black stools.   (A tablespoon of blood from the rectum is not serious,  especially if   hemorrhoids are present.)  6. Vomiting.  7. Weakness or dizziness.  GO DIRECTLY TO THE NEAREST EMERGENCY ROOM IF YOU HAVE ANY OF THE FOLLOWING:      Difficulty breathing              Chills and/or fever over 101 F   Persistent vomiting and/or vomiting blood   Severe abdominal pain   Severe chest pain   Black, tarry stools   Bleeding- more than one tablespoon   Any other symptom or condition that you feel may need urgent attention  Your doctor recommends these additional instructions:  If any biopsies were taken, your doctors clinic will contact you in 1 to 2   weeks with any results.  - Discharge patient to home (with escort).   - Patient has a contact number available for emergencies.  The signs and   symptoms of potential delayed complications were discussed with the   patient.  Return to normal activities tomorrow.  Written discharge   instructions were provided to the patient.   - Resume previous diet.   - Continue present medications.   - Repeat colonoscopy in 10 years for screening purposes.   - Return to my office PRN.  For questions, problems or results please call your physician - Danica Villafuerte MD at Work:  [phone].  [facility], EMERGENCY ROOM PHONE NUMBER: [phone]  IF A COMPLICATION OR EMERGENCY SITUATION ARISES AND YOU ARE UNABLE TO REACH   YOUR PHYSICIAN - GO DIRECTLY TO THE EMERGENCY ROOM.  Danica Villafuerte MD  7/10/2024 9:28:18 AM  This report has been verified and signed electronically.  Dear patient,  As a result of recent federal legislation (The Federal Cures Act), you may   receive lab or pathology results from your procedure in your [facility]   account before your physician is able to contact you. Your physician or   their representative will relay the results to you with their   recommendations at their soonest availability.  Thank you,  PROVATION

## 2024-07-10 NOTE — ANESTHESIA POSTPROCEDURE EVALUATION
Anesthesia Post Evaluation    Patient: Yenifer Chatterjee    Procedure(s) Performed: Procedure(s) (LRB):  COLONOSCOPY(called x2 needs mail order Rx Protonix) (N/A)    Final Anesthesia Type: general      Patient location during evaluation: PACU  Patient participation: Yes- Able to Participate  Level of consciousness: awake and alert  Post-procedure vital signs: reviewed and stable  Pain management: adequate  Airway patency: patent    PONV status at discharge: No PONV  Anesthetic complications: no      Cardiovascular status: hemodynamically stable  Respiratory status: unassisted and room air  Hydration status: euvolemic  Follow-up not needed.              Vitals Value Taken Time   /65 07/10/24 0942     07/10/24 1250   Pulse 60 07/10/24 0945   Resp 16 07/10/24 0940   SpO2 99 % 07/10/24 0945   Vitals shown include unfiled device data.      Event Time   Out of Recovery 09:52:42         Pain/Ed Score: Ed Score: 10 (7/10/2024  9:41 AM)

## 2024-07-10 NOTE — TRANSFER OF CARE
"Anesthesia Transfer of Care Note    Patient: Yenifer Chatterjee    Procedure(s) Performed: Procedure(s) (LRB):  COLONOSCOPY(called x2 needs mail order Rx Protonix) (N/A)    Patient location: GI    Anesthesia Type: general    Transport from OR: Transported from OR on room air with adequate spontaneous ventilation    Post pain: adequate analgesia    Post assessment: no apparent anesthetic complications    Post vital signs: stable    Level of consciousness: lethargic and responds to stimulation    Nausea/Vomiting: no nausea/vomiting    Complications: none    Transfer of care protocol was followed      Last vitals: Visit Vitals  /64 (BP Location: Left arm, Patient Position: Lying)   Pulse 67   Temp 36 °C (96.8 °F) (Skin)   Resp 18   Ht 5' 4" (1.626 m)   Wt 98 kg (216 lb)   SpO2 98%   Breastfeeding No   BMI 37.08 kg/m²     "

## 2024-07-11 VITALS
HEART RATE: 60 BPM | SYSTOLIC BLOOD PRESSURE: 108 MMHG | WEIGHT: 216 LBS | OXYGEN SATURATION: 100 % | BODY MASS INDEX: 36.88 KG/M2 | TEMPERATURE: 97 F | DIASTOLIC BLOOD PRESSURE: 65 MMHG | HEIGHT: 64 IN | RESPIRATION RATE: 16 BRPM

## 2024-08-21 ENCOUNTER — ANESTHESIA (OUTPATIENT)
Dept: ENDOSCOPY | Facility: HOSPITAL | Age: 59
End: 2024-08-21
Payer: COMMERCIAL

## 2024-08-21 ENCOUNTER — ANESTHESIA EVENT (OUTPATIENT)
Dept: ENDOSCOPY | Facility: HOSPITAL | Age: 59
End: 2024-08-21
Payer: COMMERCIAL

## 2024-08-21 ENCOUNTER — HOSPITAL ENCOUNTER (OUTPATIENT)
Facility: HOSPITAL | Age: 59
Discharge: HOME | End: 2024-08-21
Attending: INTERNAL MEDICINE | Admitting: INTERNAL MEDICINE
Payer: COMMERCIAL

## 2024-08-21 VITALS
OXYGEN SATURATION: 98 % | RESPIRATION RATE: 16 BRPM | SYSTOLIC BLOOD PRESSURE: 132 MMHG | HEART RATE: 65 BPM | BODY MASS INDEX: 35.36 KG/M2 | DIASTOLIC BLOOD PRESSURE: 59 MMHG | WEIGHT: 206 LBS | TEMPERATURE: 98 F

## 2024-08-21 DIAGNOSIS — K22.10 ULCER OF ESOPHAGUS: ICD-10-CM

## 2024-08-21 DIAGNOSIS — K21.9 GASTROESOPHAGEAL REFLUX DISEASE, UNSPECIFIED WHETHER ESOPHAGITIS PRESENT: ICD-10-CM

## 2024-08-21 PROCEDURE — 25000003 PHARM REV CODE 250: Performed by: INTERNAL MEDICINE

## 2024-08-21 PROCEDURE — 43235 EGD DIAGNOSTIC BRUSH WASH: CPT | Mod: ,,, | Performed by: INTERNAL MEDICINE

## 2024-08-21 PROCEDURE — 37000008 HC ANESTHESIA 1ST 15 MINUTES: Performed by: INTERNAL MEDICINE

## 2024-08-21 PROCEDURE — 63600175 PHARM REV CODE 636 W HCPCS: Performed by: NURSE ANESTHETIST, CERTIFIED REGISTERED

## 2024-08-21 PROCEDURE — 25000003 PHARM REV CODE 250: Performed by: NURSE ANESTHETIST, CERTIFIED REGISTERED

## 2024-08-21 PROCEDURE — 43235 EGD DIAGNOSTIC BRUSH WASH: CPT | Performed by: INTERNAL MEDICINE

## 2024-08-21 RX ORDER — PROPOFOL 10 MG/ML
VIAL (ML) INTRAVENOUS
Status: DISCONTINUED | OUTPATIENT
Start: 2024-08-21 | End: 2024-08-21

## 2024-08-21 RX ORDER — PANTOPRAZOLE SODIUM 40 MG/1
40 TABLET, DELAYED RELEASE ORAL DAILY
Qty: 90 TABLET | Refills: 0 | Status: SHIPPED | OUTPATIENT
Start: 2024-08-21 | End: 2024-11-19

## 2024-08-21 RX ORDER — LIDOCAINE HYDROCHLORIDE 20 MG/ML
INJECTION INTRAVENOUS
Status: DISCONTINUED | OUTPATIENT
Start: 2024-08-21 | End: 2024-08-21

## 2024-08-21 RX ORDER — SODIUM CHLORIDE 9 MG/ML
INJECTION, SOLUTION INTRAVENOUS CONTINUOUS
Status: DISCONTINUED | OUTPATIENT
Start: 2024-08-21 | End: 2024-08-21 | Stop reason: HOSPADM

## 2024-08-21 RX ADMIN — PROPOFOL 100 MG: 10 INJECTION, EMULSION INTRAVENOUS at 11:08

## 2024-08-21 RX ADMIN — LIDOCAINE HYDROCHLORIDE 100 MG: 20 INJECTION, SOLUTION INTRAVENOUS at 11:08

## 2024-08-21 RX ADMIN — SODIUM CHLORIDE: 9 INJECTION, SOLUTION INTRAVENOUS at 11:08

## 2024-08-21 NOTE — ANESTHESIA POSTPROCEDURE EVALUATION
Anesthesia Post Evaluation    Patient: Yenifer Chatterjee    Procedure(s) Performed: Procedure(s) (LRB):  EGD (ESOPHAGOGASTRODUODENOSCOPY) (N/A)    Final Anesthesia Type: general      Patient location during evaluation: PACU  Patient participation: Yes- Able to Participate  Level of consciousness: awake and alert  Post-procedure vital signs: reviewed and stable  Pain management: adequate  Airway patency: patent    PONV status at discharge: No PONV  Anesthetic complications: no      Cardiovascular status: hemodynamically stable  Respiratory status: unassisted and room air  Hydration status: euvolemic  Follow-up not needed.              Vitals Value Taken Time   /59 08/21/24 1155   Temp 36.7 °C (98.1 °F) 08/21/24 1155   Pulse 65 08/21/24 1155   Resp 16 08/21/24 1155   SpO2 98 % 08/21/24 1155         Event Time   Out of Recovery 12:00:00         Pain/Ed Score: Ed Score: 10 (8/21/2024 11:47 AM)

## 2024-08-21 NOTE — PROVATION PATIENT INSTRUCTIONS
Discharge Summary/Instructions after an Endoscopic Procedure  Patient Name: Yenifer Chatterjee  Patient MRN: [id number]  Patient YOB: 1965  Wednesday, August 21, 2024  Danica Villafuerte MD  Dear patient,  As a result of recent federal legislation (The Federal Cures Act), you may   receive lab or pathology results from your procedure in your [facility]   account before your physician is able to contact you. Your physician or   their representative will relay the results to you with their   recommendations at their soonest availability.  Thank you,  RESTRICTIONS:  During your procedure today, you received medications for sedation.  These   medications may affect your judgment, balance and coordination.  Therefore,   for 24 hours, you have the following restrictions:   - DO NOT drive a car, operate machinery, make legal/financial decisions,   sign important papers or drink alcohol.    ACTIVITY:  Today: no heavy lifting, straining or running due to procedural   sedation/anesthesia.  The following day: return to full activity including work.  DIET:  Eat and drink normally unless instructed otherwise.     TREATMENT FOR COMMON SIDE EFFECTS:  - Mild abdominal pain, nausea, belching, bloating or excessive gas:  rest,   eat lightly and use a heating pad.  - Sore Throat: treat with throat lozenges and/or gargle with warm salt   water.  - Because air was used during the procedure, expelling large amounts of air   from your rectum or belching is normal.  - If a bowel prep was taken, you may not have a bowel movement for 1-3 days.    This is normal.  SYMPTOMS TO WATCH FOR AND REPORT TO YOUR PHYSICIAN:  1. Abdominal pain or bloating, other than gas cramps.  2. Chest pain.  3. Back pain.  4. Signs of infection such as: chills or fever occurring within 24 hours   after the procedure.  5. Rectal bleeding, which would show as bright red, maroon, or black stools.   (A tablespoon of blood from the rectum is not serious,  especially if   hemorrhoids are present.)  6. Vomiting.  7. Weakness or dizziness.  GO DIRECTLY TO THE NEAREST EMERGENCY ROOM IF YOU HAVE ANY OF THE FOLLOWING:      Difficulty breathing              Chills and/or fever over 101 F   Persistent vomiting and/or vomiting blood   Severe abdominal pain   Severe chest pain   Black, tarry stools   Bleeding- more than one tablespoon   Any other symptom or condition that you feel may need urgent attention  Your doctor recommends these additional instructions:  If any biopsies were taken, your doctors clinic will contact you in 1 to 2   weeks with any results.  - Discharge patient to home (with escort).   - Patient has a contact number available for emergencies.  The signs and   symptoms of potential delayed complications were discussed with the   patient.  Return to normal activities tomorrow.  Written discharge   instructions were provided to the patient.   - Resume previous diet.   - Decrease PPI to daily dosing x 90 days  For questions, problems or results please call your physician - Danica Villafuerte MD at Work:  [phone].  [facility] PHONE NUMBER: [phone]  IF A COMPLICATION OR EMERGENCY SITUATION ARISES AND YOU ARE UNABLE TO REACH   YOUR PHYSICIAN - GO DIRECTLY TO THE EMERGENCY ROOM.  Danica Villafuerte MD  8/21/2024 11:38:11 AM  This report has been verified and signed electronically.  Dear patient,  As a result of recent federal legislation (The Federal Cures Act), you may   receive lab or pathology results from your procedure in your [facility]   account before your physician is able to contact you. Your physician or   their representative will relay the results to you with their   recommendations at their soonest availability.  Thank you,  PROVATION

## 2024-08-21 NOTE — H&P
[facility]    CC: Esophageal ulcer     HPI 58 y.o. female presents for follow up of esophageal ulcer    Past Medical History:   Diagnosis Date    Diverticulitis     Esophagitis     History of diverticulitis 04/25/2024    Kidney stone     Urinary tract infection        Allergies and Medications reviewed     Review of Systems  General ROS: negative for - chills, fever or weight loss  Cardiovascular ROS: no chest pain or dyspnea on exertion  Gastrointestinal ROS: no vomiting    Physical Examination  There were no vitals taken for this visit.  General appearance: alert, cooperative, no distress  HENT: Normocephalic, atraumatic, neck symmetrical, no nasal discharge, sclera anicteric   Lungs: clear to auscultation bilaterally, symmetric chest wall expansion bilaterally  Heart: regular rate and rhythm without rub; no displacement of the PMI   Abdomen: soft  Extremities: extremities symmetric; no clubbing, cyanosis, or edema        Labs:  Lab Results   Component Value Date    WBC 7.29 04/02/2024    HGB 12.5 04/02/2024    HCT 39.7 04/02/2024    MCV 96 04/02/2024     04/02/2024       Assessment:   58 y.o. female presents for EGD    Plan:  -Proceed to EGD    Danica Villafuerte MD  [facility]  [address], [address]  [place] [address]  Office: [phone]  Fax: [phone]

## 2024-08-21 NOTE — ANESTHESIA PREPROCEDURE EVALUATION
08/21/2024  Yenifer Chatterjee is a 58 y.o., female.      Pre-op Assessment    I have reviewed the Patient Summary Reports.     I have reviewed the Nursing Notes. I have reviewed the NPO Status.   I have reviewed the Medications.     Review of Systems  Anesthesia Hx:  No problems with previous Anesthesia                Social:  Non-Smoker       Cardiovascular:  Cardiovascular Normal                                            Pulmonary:  Pulmonary Normal                       Renal/:  Chronic Renal Disease renal calculi       Kidney Function/Disease             Neurological:  Neurology Normal                                      Endocrine:  Endocrine Normal          Obesity / BMI > 30      Physical Exam  General: Well nourished, Cooperative, Alert and Oriented    Airway:  Mallampati: II   Mouth Opening: Normal  TM Distance: Normal  Neck ROM: Normal ROM    Dental:  Intact    Chest/Lungs:  Normal Respiratory Rate    Heart:  Rate: Normal      Anesthesia Plan  Type of Anesthesia, risks & benefits discussed:    Anesthesia Type: Gen Natural Airway  Intra-op Monitoring Plan: Standard ASA Monitors  Induction:  IV  Informed Consent: Informed consent signed with the Patient and all parties understand the risks and agree with anesthesia plan.  All questions answered.   ASA Score: 2    Ready For Surgery From Anesthesia Perspective.     .

## 2024-08-21 NOTE — OR NURSING
1135 Assumed care, vss, see assess. Nadn. Will monitor.   1200 Pt tolerating po fluids, pain controlled. All belongings returned to pt. D/c instructions given and explained to pt, pt v/u. D/c'd out to pov via wc per staff with nadn.

## 2024-08-27 ENCOUNTER — OFFICE VISIT (OUTPATIENT)
Dept: URGENT CARE | Facility: CLINIC | Age: 59
End: 2024-08-27
Payer: COMMERCIAL

## 2024-08-27 VITALS
SYSTOLIC BLOOD PRESSURE: 119 MMHG | OXYGEN SATURATION: 99 % | HEIGHT: 64 IN | DIASTOLIC BLOOD PRESSURE: 82 MMHG | BODY MASS INDEX: 35.17 KG/M2 | WEIGHT: 206 LBS | TEMPERATURE: 98 F | HEART RATE: 74 BPM | RESPIRATION RATE: 16 BRPM

## 2024-08-27 DIAGNOSIS — J40 BRONCHITIS: Primary | ICD-10-CM

## 2024-08-27 DIAGNOSIS — R05.9 COUGH, UNSPECIFIED TYPE: ICD-10-CM

## 2024-08-27 PROCEDURE — 99204 OFFICE O/P NEW MOD 45 MIN: CPT | Mod: S$GLB,,, | Performed by: NURSE PRACTITIONER

## 2024-08-27 RX ORDER — GUAIFENESIN 600 MG/1
1200 TABLET, EXTENDED RELEASE ORAL 2 TIMES DAILY
Qty: 40 TABLET | Refills: 0 | Status: SHIPPED | OUTPATIENT
Start: 2024-08-27 | End: 2024-08-27

## 2024-08-27 RX ORDER — DOXYCYCLINE 100 MG/1
100 CAPSULE ORAL 2 TIMES DAILY
Qty: 10 CAPSULE | Refills: 0 | Status: SHIPPED | OUTPATIENT
Start: 2024-08-27 | End: 2024-09-01

## 2024-08-27 RX ORDER — DOXYCYCLINE 100 MG/1
100 CAPSULE ORAL 2 TIMES DAILY
Qty: 10 CAPSULE | Refills: 0 | Status: SHIPPED | OUTPATIENT
Start: 2024-08-27 | End: 2024-08-27

## 2024-08-27 RX ORDER — PROMETHAZINE HYDROCHLORIDE AND DEXTROMETHORPHAN HYDROBROMIDE 6.25; 15 MG/5ML; MG/5ML
5 SYRUP ORAL EVERY 4 HOURS PRN
Qty: 118 ML | Refills: 0 | Status: SHIPPED | OUTPATIENT
Start: 2024-08-27 | End: 2024-09-06

## 2024-08-27 RX ORDER — METHYLPREDNISOLONE 4 MG/1
TABLET ORAL
Qty: 21 EACH | Refills: 0 | Status: SHIPPED | OUTPATIENT
Start: 2024-08-27 | End: 2024-08-27

## 2024-08-27 RX ORDER — METHYLPREDNISOLONE 4 MG/1
TABLET ORAL
Qty: 21 EACH | Refills: 0 | Status: SHIPPED | OUTPATIENT
Start: 2024-08-27 | End: 2024-09-17

## 2024-08-27 RX ORDER — PROMETHAZINE HYDROCHLORIDE AND DEXTROMETHORPHAN HYDROBROMIDE 6.25; 15 MG/5ML; MG/5ML
5 SYRUP ORAL EVERY 4 HOURS PRN
Qty: 118 ML | Refills: 0 | Status: SHIPPED | OUTPATIENT
Start: 2024-08-27 | End: 2024-08-27

## 2024-08-27 RX ORDER — GUAIFENESIN 600 MG/1
1200 TABLET, EXTENDED RELEASE ORAL 2 TIMES DAILY
Qty: 40 TABLET | Refills: 0 | Status: SHIPPED | OUTPATIENT
Start: 2024-08-27 | End: 2024-09-06

## 2024-08-27 NOTE — PROGRESS NOTES
"Subjective:      Patient ID: Yenifer Chatterjee is a 58 y.o. female.    Vitals:  height is 5' 4" (1.626 m) and weight is 93.4 kg (206 lb). Her temperature is 98.2 °F (36.8 °C). Her blood pressure is 119/82 and her pulse is 74. Her respiration is 16 and oxygen saturation is 99%.     Chief Complaint: Cough    Cough  This is a new problem. Episode onset: x 2 weeks. The problem has been unchanged. The cough is Non-productive. Associated symptoms include postnasal drip and a sore throat. Pertinent negatives include no chest pain, chills, ear pain, eye redness, fever, headaches, myalgias, rash, shortness of breath or wheezing. There is no history of environmental allergies.       Constitution: Positive for fatigue. Negative for activity change, appetite change, chills, fever, unexpected weight change and generalized weakness.   HENT:  Positive for congestion, postnasal drip and sore throat. Negative for ear pain, ear discharge, foreign body in ear, tinnitus, hearing loss, dental problem, mouth sores, tongue pain, facial swelling, sinus pain, sinus pressure, trouble swallowing and voice change.    Neck: Negative for neck pain, neck stiffness and painful lymph nodes.   Cardiovascular:  Negative for chest pain, leg swelling, palpitations and sob on exertion.   Eyes:  Negative for eye trauma, eye discharge, eye itching, eye pain, eye redness, vision loss and eyelid swelling.   Respiratory:  Positive for cough and sputum production. Negative for chest tightness, COPD, shortness of breath, wheezing and asthma.    Gastrointestinal:  Negative for abdominal pain, nausea, vomiting, constipation, diarrhea, bright red blood in stool and dark colored stools.   Endocrine: hair loss, cold intolerance and heat intolerance.   Genitourinary:  Negative for dysuria, frequency, urgency and hematuria.   Musculoskeletal:  Negative for pain, trauma, joint pain, joint swelling, abnormal ROM of joint and muscle ache.   Skin:  Negative for color change, " pale, rash, wound and hives.   Allergic/Immunologic: Negative for environmental allergies, seasonal allergies, food allergies, asthma, hives and itching.   Neurological:  Negative for dizziness, history of vertigo, light-headedness, facial drooping, speech difficulty, headaches, disorientation, altered mental status, loss of consciousness and numbness.   Hematologic/Lymphatic: Negative for swollen lymph nodes and easy bruising/bleeding. Does not bruise/bleed easily.   Psychiatric/Behavioral:  Negative for altered mental status, disorientation, confusion, agitation, sleep disturbance and hallucinations.       Objective:     Physical Exam   Constitutional: She is oriented to person, place, and time. She appears well-developed. She is cooperative.   HENT:   Head: Normocephalic and atraumatic.   Ears:   Right Ear: Hearing, tympanic membrane, external ear and ear canal normal.   Left Ear: Hearing, tympanic membrane, external ear and ear canal normal.   Nose: Rhinorrhea present. No mucosal edema or nasal deformity. No epistaxis. Right sinus exhibits no maxillary sinus tenderness and no frontal sinus tenderness. Left sinus exhibits no maxillary sinus tenderness and no frontal sinus tenderness.   Mouth/Throat: Uvula is midline and mucous membranes are normal. No trismus in the jaw. Normal dentition. No uvula swelling. Posterior oropharyngeal erythema present.   Eyes: Conjunctivae and lids are normal.   Neck: Trachea normal and phonation normal. Neck supple.   Cardiovascular: Normal rate, regular rhythm, normal heart sounds and normal pulses.   Pulmonary/Chest: Effort normal. She has wheezes (Expiratory). She has rales in the right upper field and the left upper field.   Abdominal: Normal appearance and bowel sounds are normal. Soft.   Musculoskeletal: Normal range of motion.         General: Normal range of motion.   Neurological: She is alert and oriented to person, place, and time. She exhibits normal muscle tone.   Skin:  Skin is warm, dry and intact.   Psychiatric: Her speech is normal and behavior is normal. Judgment and thought content normal.   Nursing note and vitals reviewed.      Assessment:     1. Bronchitis    2. Cough, unspecified type        Plan:       Bronchitis  -     doxycycline (VIBRAMYCIN) 100 MG Cap; Take 1 capsule (100 mg total) by mouth 2 (two) times daily. for 5 days  Dispense: 10 capsule; Refill: 0  -     guaiFENesin (MUCINEX) 600 mg 12 hr tablet; Take 2 tablets (1,200 mg total) by mouth 2 (two) times daily. for 10 days  Dispense: 40 tablet; Refill: 0  -     methylPREDNISolone (MEDROL DOSEPACK) 4 mg tablet; use as directed  Dispense: 21 each; Refill: 0    Cough, unspecified type  -     promethazine-dextromethorphan (PROMETHAZINE-DM) 6.25-15 mg/5 mL Syrp; Take 5 mLs by mouth every 4 (four) hours as needed (Cough).  Dispense: 118 mL; Refill: 0      Utilize over-the-counter Tylenol or Motrin as directed for fever.    Ensure adequate fluid intake with electrolytes.    Return to clinic for new or worsening symptoms.  Patient is recommended to follow-up with their PCP post discharge.    Total time spent on med rec, H&P, with over half of the time in direct patient care: 34 minutes       Additional MDM:     Heart Failure Score:   COPD = No

## 2024-08-28 ENCOUNTER — OFFICE VISIT (OUTPATIENT)
Dept: UROGYNECOLOGY | Facility: CLINIC | Age: 59
End: 2024-08-28
Payer: COMMERCIAL

## 2024-08-28 VITALS — HEART RATE: 74 BPM | SYSTOLIC BLOOD PRESSURE: 119 MMHG | DIASTOLIC BLOOD PRESSURE: 82 MMHG

## 2024-08-28 DIAGNOSIS — N95.2 ATROPHIC VAGINITIS: ICD-10-CM

## 2024-08-28 DIAGNOSIS — R39.15 URINARY URGENCY: ICD-10-CM

## 2024-08-28 DIAGNOSIS — N39.0 RECURRENT URINARY TRACT INFECTION: Primary | ICD-10-CM

## 2024-08-28 PROCEDURE — 99212 OFFICE O/P EST SF 10 MIN: CPT | Mod: S$GLB,,, | Performed by: OBSTETRICS & GYNECOLOGY

## 2024-08-28 PROCEDURE — 99999 PR PBB SHADOW E&M-EST. PATIENT-LVL III: CPT | Mod: PBBFAC,,, | Performed by: OBSTETRICS & GYNECOLOGY

## 2024-08-28 RX ORDER — ESTRADIOL 10 UG/1
10 INSERT VAGINAL
Qty: 36 TABLET | Refills: 3 | Status: SHIPPED | OUTPATIENT
Start: 2024-08-28 | End: 2025-08-28

## 2024-08-28 NOTE — PROGRESS NOTES
Chief Complaint   Patient presents with    follow up        HPI: Patient is a 58 y.o. female  who presents today for a follow up appointment regarding recurrent UTI, sepsis and OAB symptoms.    Patient states she was using Vagifem three times per week at bedtime. Last used it the last week in July. Ran out and her insurance insisted she use a mail order pharmacy going forward. Knew she had this appointment and thought she would ask at this appointment today. Would like it sent to [facility]. Reports that she is able to see the difference when she uses it compared to when she does not. She notices less discomfort when she uses it.   She is not taking the Oxybutynin. Feels that the estrogen helped improve with the urinary urgency and frequency. She is waking about 2-3 times per night. Voids during the day about 3-4 times. Stops her fluid intake at about 7pm. Has recently been sipping water overnight due to her cough associated with bronchitits. She denies that waking is bothersome for her.   She drinks about 3 bottles of water per day.     She has ordered both Lady Bugs and Craneaze but has not started them yet. Last abnormal urine culture was 24 for GBBS.     REVIEW OF SYSTEMS:  A full 14-point ROS was performed and was significant for those  mentioned in the HPI.     The following portions of the patient's history were reviewed and updated as appropriate: allergies, current medications, past medical history, past surgical history and problem list.    PHYSICAL EXAMINATION    Vitals:    24 1114   BP: 119/82   Pulse: 74        General: Healthy in appearance, Well nourished, Affect Normal, NAD.      No visits with results within 1 Day(s) from this visit.   Latest known visit with results is:   Office Visit on 2024   Component Date Value Ref Range Status    Color, POC UA 2024 Yellow  Yellow, Straw, Colorless Final    Clarity, POC UA 2024 Clear  Clear Final    Glucose, POC UA 2024  Negative  Negative Final    Bilirubin, POC UA 06/26/2024 Negative  Negative Final    Ketones, POC UA 06/26/2024 Negative  Negative Final    Spec Grav POC UA 06/26/2024 >=1.030  1.005 - 1.030 Final    Blood, POC UA 06/26/2024 Small (A)  Negative Final    pH, POC UA 06/26/2024 6.0  5.0 - 8.0 Final    Protein, POC UA 06/26/2024 Negative  Negative Final    Urobilinogen, POC UA 06/26/2024 0.2  <=1.0 Final    Nitrite, POC UA 06/26/2024 Negative  Negative Final    WBC, POC UA 06/26/2024 Negative  Negative Final        ASSESSMENT & PLAN:    Recurrent urinary tract infection    Atrophic vaginitis  -     estradioL (VAGIFEM) 10 mcg Tab; Place 1 tablet (10 mcg total) vaginally 3 (three) times a week. Start with one tablet per vagina q.h.s. x7 and then one tablet twice weekly  Dispense: 36 tablet; Refill: 3    Urinary urgency       57 yo with OAB and recurrent UTIs presented for a follow up appointment. Discussed with patient that regular use of the vaginal estrogen will help prevent UTI's. New prescription sent to [facility] as requested. Will also start to use the probiotics (Lady Bugs) and Craneaze (D-Mannose with PROANTHOCYANIDINS).     IS not currently bothered by OAB. States that she does not mind waking at nighttime and does not want to add more medication. She will return in 3 months for re-evaluation.     All questions were answered today. The patient was encouraged to contact the office as needed with any additional questions or concerns.     Total time spent on visit was 14 minutes.  This includes face to face time and non-face to face time preparing to see the patient (eg, review of tests), Obtaining and/or reviewing separately obtained history, Documenting clinical information in the electronic or other health record, Independently interpreting resultsand communicating results to the patient/family/caregiver, or Care coordination.    Sri Ellison MD

## 2024-09-03 ENCOUNTER — TELEPHONE (OUTPATIENT)
Dept: UROGYNECOLOGY | Facility: CLINIC | Age: 59
End: 2024-09-03
Payer: COMMERCIAL

## 2024-09-25 ENCOUNTER — TELEPHONE (OUTPATIENT)
Dept: UROLOGY | Facility: CLINIC | Age: 59
End: 2024-09-25
Payer: COMMERCIAL

## 2024-09-25 ENCOUNTER — LAB VISIT (OUTPATIENT)
Dept: LAB | Facility: HOSPITAL | Age: 59
End: 2024-09-25
Attending: NURSE PRACTITIONER
Payer: COMMERCIAL

## 2024-09-25 DIAGNOSIS — N39.0 URINARY TRACT INFECTION WITHOUT HEMATURIA, SITE UNSPECIFIED: ICD-10-CM

## 2024-09-25 DIAGNOSIS — N39.0 URINARY TRACT INFECTION WITHOUT HEMATURIA, SITE UNSPECIFIED: Primary | ICD-10-CM

## 2024-09-25 PROCEDURE — 87186 SC STD MICRODIL/AGAR DIL: CPT | Performed by: NURSE PRACTITIONER

## 2024-09-25 PROCEDURE — 87086 URINE CULTURE/COLONY COUNT: CPT | Performed by: NURSE PRACTITIONER

## 2024-09-25 NOTE — TELEPHONE ENCOUNTER
Returned pt call. Advised orders are in to go to [facility] to give specimen for UTI. Pt voiced understanding

## 2024-09-25 NOTE — TELEPHONE ENCOUNTER
----- Message from Dolly Tineo sent at 9/25/2024  8:23 AM CDT -----  Type: Needs Medical Advice  Who Called:  pt  Best Call Back Number: [phone]  Additional Information: pt is calling the office to speak with Jennifer in regards to her urine culture order. Please call back to advise. Thanks!

## 2024-09-27 ENCOUNTER — TELEPHONE (OUTPATIENT)
Dept: UROLOGY | Facility: CLINIC | Age: 59
End: 2024-09-27
Payer: COMMERCIAL

## 2024-09-27 LAB — BACTERIA UR CULT: ABNORMAL

## 2024-09-27 RX ORDER — SULFAMETHOXAZOLE AND TRIMETHOPRIM 800; 160 MG/1; MG/1
1 TABLET ORAL 2 TIMES DAILY
Qty: 28 TABLET | Refills: 0 | Status: SHIPPED | OUTPATIENT
Start: 2024-09-27 | End: 2024-10-11

## 2024-09-27 NOTE — TELEPHONE ENCOUNTER
----- Message from Stefani Peña sent at 9/27/2024  8:55 AM CDT -----  Type:  Test Results    Who Called: pt   Name of Test (Lab/Mammo/Etc): urinalysis   Date of Test: 9/25  Ordering Provider: Barbara   Where the test was performed: [facility]   Would the patient rather a call back or a response via [facility]? Call   Best Call Back Number:  [phone]  Additional Information:

## 2024-09-27 NOTE — TELEPHONE ENCOUNTER
Pt calling regarding urinalysis results. Advised results have not been reviewed by NP. Once reviewed a call will be placed with final results. Pt voiced understanding

## 2024-09-27 NOTE — TELEPHONE ENCOUNTER
----- Message from Deven Luke sent at 9/27/2024 11:51 AM CDT -----  Contact: Self  Type:  Patient Returning Call    Who Called:  Patient  Who Left Message for Patient:  Jennifer (?)  Does the patient know what this is regarding?:  Yes  Best Call Back Number:  [phone]   Additional Information:

## 2024-10-06 ENCOUNTER — EXTERNAL HOME HEALTH (OUTPATIENT)
Dept: HOME HEALTH SERVICES | Facility: HOSPITAL | Age: 59
End: 2024-10-06
Payer: COMMERCIAL

## 2025-06-09 ENCOUNTER — OFFICE VISIT (OUTPATIENT)
Dept: URGENT CARE | Facility: CLINIC | Age: 60
End: 2025-06-09
Payer: COMMERCIAL

## 2025-06-09 VITALS
RESPIRATION RATE: 20 BRPM | HEIGHT: 64 IN | BODY MASS INDEX: 35.17 KG/M2 | DIASTOLIC BLOOD PRESSURE: 77 MMHG | SYSTOLIC BLOOD PRESSURE: 121 MMHG | TEMPERATURE: 98 F | HEART RATE: 78 BPM | OXYGEN SATURATION: 97 % | WEIGHT: 206 LBS

## 2025-06-09 DIAGNOSIS — R35.0 URINARY FREQUENCY: Primary | ICD-10-CM

## 2025-06-09 DIAGNOSIS — N39.0 URINARY TRACT INFECTION WITHOUT HEMATURIA, SITE UNSPECIFIED: ICD-10-CM

## 2025-06-09 DIAGNOSIS — Z86.19 HISTORY OF SEPSIS: ICD-10-CM

## 2025-06-09 DIAGNOSIS — Z87.448 HISTORY OF PYELONEPHRITIS: ICD-10-CM

## 2025-06-09 PROBLEM — I44.7 LBBB (LEFT BUNDLE BRANCH BLOCK): Status: ACTIVE | Noted: 2024-10-04

## 2025-06-09 PROBLEM — F41.9 ANXIETY: Status: ACTIVE | Noted: 2024-03-11

## 2025-06-09 LAB
BILIRUB UR QL STRIP: NEGATIVE
GLUCOSE UR QL STRIP: NEGATIVE
KETONES UR QL STRIP: NEGATIVE
LEUKOCYTE ESTERASE UR QL STRIP: NEGATIVE
PH, POC UA: 6
POC BLOOD, URINE: POSITIVE
POC NITRATES, URINE: NEGATIVE
PROT UR QL STRIP: NEGATIVE
SP GR UR STRIP: 1.03 (ref 1–1.03)
UROBILINOGEN UR STRIP-ACNC: ABNORMAL (ref 0.1–1.1)

## 2025-06-09 RX ORDER — CEFTRIAXONE 1 G/1
1 INJECTION, POWDER, FOR SOLUTION INTRAMUSCULAR; INTRAVENOUS
Status: COMPLETED | OUTPATIENT
Start: 2025-06-09 | End: 2025-06-09

## 2025-06-09 RX ORDER — DOXEPIN 3 MG/1
1 TABLET, FILM COATED ORAL
COMMUNITY
Start: 2025-03-12

## 2025-06-09 RX ORDER — GABAPENTIN 100 MG/1
100 CAPSULE ORAL 3 TIMES DAILY
COMMUNITY
Start: 2025-03-12

## 2025-06-09 RX ORDER — SULFAMETHOXAZOLE AND TRIMETHOPRIM 800; 160 MG/1; MG/1
1 TABLET ORAL 2 TIMES DAILY
Qty: 20 TABLET | Refills: 0 | Status: SHIPPED | OUTPATIENT
Start: 2025-06-09 | End: 2025-06-19

## 2025-06-09 RX ADMIN — CEFTRIAXONE 1 G: 1 INJECTION, POWDER, FOR SOLUTION INTRAMUSCULAR; INTRAVENOUS at 04:06

## 2025-06-09 NOTE — PATIENT INSTRUCTIONS
Bactrim twice a day for 10 days    Urine collected today was sent out to the lab for culture.  Expect results in 3-7 days.  You can be looking on the [facility] MyChart for results or someone from the clinic will be contacting you once we have received results and had time to review them    Symptoms worsen or fail to improve after 48-72 hours the above treatment plan please seek medical re-evaluation promptly    Please notify urologist regarding your current symptoms, treatment plan and pending urine culture results

## 2025-06-09 NOTE — PROGRESS NOTES
"Subjective:      Patient ID: Yenifer Chatterjee is a 59 y.o. female.    Vitals:  height is 5' 4" (1.626 m) and weight is 93.4 kg (206 lb). Her oral temperature is 98 °F (36.7 °C). Her blood pressure is 121/77 and her pulse is 78. Her respiration is 20 and oxygen saturation is 97%.     Chief Complaint: Urinary Frequency    Urinary Frequency   Episode onset: x 3 days. Associated symptoms include flank pain (Left), frequency and nausea (Onset yesterday). Pertinent negatives include no chills, vomiting or rash. Associated symptoms comments: Lower back pain, discomfort, and frequency.       Constitution: Negative for appetite change, chills and fever.   Gastrointestinal:  Positive for nausea (Onset yesterday) and diarrhea (X2 days). Negative for abdominal pain and vomiting.   Genitourinary:  Positive for dysuria, frequency and flank pain (Left).   Skin:  Negative for rash.      Objective:     Physical Exam   Constitutional: She is oriented to person, place, and time.  Non-toxic appearance. She does not appear ill. No distress. obesity  HENT:   Head: Normocephalic and atraumatic.   Nose: Nose normal.   Mouth/Throat: Mucous membranes are moist.   Eyes: Conjunctivae are normal.   Cardiovascular: Normal rate.   Pulmonary/Chest: Effort normal. No respiratory distress.   Abdominal: Normal appearance. There is left CVA tenderness. There is no right CVA tenderness.   Neurological: no focal deficit. She is alert and oriented to person, place, and time.   Skin: Skin is warm, dry and not diaphoretic. Capillary refill takes 2 to 3 seconds.   Psychiatric: Her behavior is normal. Mood normal.   Nursing note and vitals reviewed.      Assessment:     1. Urinary frequency    2. Urinary tract infection without hematuria, site unspecified    3. History of sepsis    4. History of pyelonephritis      UA: 10 rbc's, otherwise negative  Urine culture pending      Rocephin 1 g IM given in clinic with concern for emerging pyelo and a history of " urosepsis x2    Plan:       Urinary frequency  -     POCT Urinalysis, Dipstick, Manual, W/O Scope    Urinary tract infection without hematuria, site unspecified  -     cefTRIAXone injection 1 g  -     sulfamethoxazole-trimethoprim 800-160mg (BACTRIM DS) 800-160 mg Tab; Take 1 tablet by mouth 2 (two) times daily. for 10 days  Dispense: 20 tablet; Refill: 0  -     Urine culture    History of sepsis  -     sulfamethoxazole-trimethoprim 800-160mg (BACTRIM DS) 800-160 mg Tab; Take 1 tablet by mouth 2 (two) times daily. for 10 days  Dispense: 20 tablet; Refill: 0  -     Urine culture    History of pyelonephritis  -     sulfamethoxazole-trimethoprim 800-160mg (BACTRIM DS) 800-160 mg Tab; Take 1 tablet by mouth 2 (two) times daily. for 10 days  Dispense: 20 tablet; Refill: 0  -     Urine culture                Bactrim based on chart review culture results.

## 2025-06-12 LAB
BACTERIA UR CULT: NORMAL
BACTERIA UR CULT: NORMAL

## 2025-06-16 ENCOUNTER — RESULTS FOLLOW-UP (OUTPATIENT)
Dept: URGENT CARE | Facility: CLINIC | Age: 60
End: 2025-06-16

## (undated) DEVICE — SHEATH FLEXOR ACCESS 12X35

## (undated) DEVICE — SYR 10CC LUER LOCK

## (undated) DEVICE — TRAY CYSTO BASIN

## (undated) DEVICE — UNDERGLOVES BIOGEL PI SZ 6 LF

## (undated) DEVICE — FIBER QUANTA OPT SDT 272UM

## (undated) DEVICE — SET CYSTO IRRIGATION UNIV SPIK

## (undated) DEVICE — GOWN SMARTGOWN LVL4 X-LONG XL

## (undated) DEVICE — PACK CYSTO

## (undated) DEVICE — CATH POLLACK OPEN-END FLEXI-TI

## (undated) DEVICE — GUIDE WIRE MOTION .035 X 150CM

## (undated) DEVICE — SET IRR URLGY 2LINE UNIV SPIKE

## (undated) DEVICE — UNDERGLOVES BIOGEL PI SIZE 7.5

## (undated) DEVICE — UNDERGLOVE BIOGEL PI SZ 6.5 LF

## (undated) DEVICE — SOL IRR NACL .9% 3000ML

## (undated) DEVICE — EXTRACTOR TIPLESS 2.4FRX1115CM

## (undated) DEVICE — SOL NACL IRR 3000ML

## (undated) DEVICE — ADAPTER HOSE 10FT 8MM

## (undated) DEVICE — UNDERGLOVES BIOGEL PI SZ 7 LF